# Patient Record
Sex: MALE | Race: WHITE | NOT HISPANIC OR LATINO | Employment: FULL TIME | URBAN - METROPOLITAN AREA
[De-identification: names, ages, dates, MRNs, and addresses within clinical notes are randomized per-mention and may not be internally consistent; named-entity substitution may affect disease eponyms.]

---

## 2020-02-12 ENCOUNTER — TRANSCRIBE ORDERS (OUTPATIENT)
Dept: NEUROSURGERY | Facility: CLINIC | Age: 60
End: 2020-02-12

## 2020-02-12 DIAGNOSIS — M54.50 LOWER BACK PAIN: Primary | ICD-10-CM

## 2020-02-19 ENCOUNTER — OFFICE VISIT (OUTPATIENT)
Dept: NEUROSURGERY | Facility: CLINIC | Age: 60
End: 2020-02-19
Payer: COMMERCIAL

## 2020-02-19 VITALS
HEIGHT: 68 IN | RESPIRATION RATE: 16 BRPM | DIASTOLIC BLOOD PRESSURE: 84 MMHG | BODY MASS INDEX: 39.07 KG/M2 | SYSTOLIC BLOOD PRESSURE: 122 MMHG | WEIGHT: 257.8 LBS | HEART RATE: 64 BPM | TEMPERATURE: 98.6 F

## 2020-02-19 DIAGNOSIS — M54.16 LUMBAR RADICULOPATHY: ICD-10-CM

## 2020-02-19 DIAGNOSIS — M53.2X6 SPINAL INSTABILITY, LUMBAR: ICD-10-CM

## 2020-02-19 DIAGNOSIS — M54.50 LOWER BACK PAIN: ICD-10-CM

## 2020-02-19 DIAGNOSIS — M43.16 SPONDYLOLISTHESIS OF LUMBAR REGION: ICD-10-CM

## 2020-02-19 DIAGNOSIS — G89.4 CHRONIC PAIN SYNDROME: Primary | ICD-10-CM

## 2020-02-19 DIAGNOSIS — D17.79 EPIDURAL LIPOMATOSIS: ICD-10-CM

## 2020-02-19 DIAGNOSIS — M48.062 LUMBAR STENOSIS WITH NEUROGENIC CLAUDICATION: ICD-10-CM

## 2020-02-19 PROCEDURE — 99204 OFFICE O/P NEW MOD 45 MIN: CPT | Performed by: NURSE PRACTITIONER

## 2020-02-19 RX ORDER — NORTRIPTYLINE HYDROCHLORIDE 25 MG/1
25 CAPSULE ORAL
COMMUNITY
Start: 2019-08-26 | End: 2020-10-24 | Stop reason: ALTCHOICE

## 2020-02-19 RX ORDER — LISINOPRIL 20 MG/1
20 TABLET ORAL DAILY
COMMUNITY
Start: 2019-09-27 | End: 2021-10-21 | Stop reason: HOSPADM

## 2020-02-19 RX ORDER — DULOXETIN HYDROCHLORIDE 60 MG/1
60 CAPSULE, DELAYED RELEASE ORAL DAILY
COMMUNITY
Start: 2019-08-26 | End: 2022-08-02

## 2020-02-19 RX ORDER — LINAGLIPTIN 5 MG/1
TABLET, FILM COATED ORAL
COMMUNITY
Start: 2019-12-24 | End: 2020-10-24 | Stop reason: ALTCHOICE

## 2020-02-19 RX ORDER — ATORVASTATIN CALCIUM 20 MG/1
20 TABLET, FILM COATED ORAL
COMMUNITY
Start: 2020-01-10 | End: 2020-10-31 | Stop reason: HOSPADM

## 2020-02-19 RX ORDER — METOPROLOL SUCCINATE 100 MG/1
100 TABLET, EXTENDED RELEASE ORAL
COMMUNITY
Start: 2019-12-24 | End: 2020-10-31 | Stop reason: HOSPADM

## 2020-02-19 RX ORDER — OMEGA-3-ACID ETHYL ESTERS 1 G/1
2 CAPSULE, LIQUID FILLED ORAL 2 TIMES DAILY
COMMUNITY
Start: 2019-09-27

## 2020-02-19 RX ORDER — LORATADINE 10 MG/1
10 TABLET ORAL DAILY
COMMUNITY

## 2020-02-19 RX ORDER — GLIMEPIRIDE 4 MG/1
4 TABLET ORAL 2 TIMES DAILY
COMMUNITY
Start: 2019-09-27 | End: 2021-10-21 | Stop reason: HOSPADM

## 2020-02-19 NOTE — PROGRESS NOTES
Assessment/Plan:  As detailed in HPI section    He presents as a self referral for a second surgical opinion , He is currently under care of [facility] for Chronic low back pain, surgical intervention  being considered, Dr Zachary Leyva recommend fusion        Characterization of pain is consistent with Lumbar stenosis and neurogenic claudication  Pain distribution correlates with L5 S1 dermatomal distribution  Imagining MRI Lumbar spine 12/5/2019: reviewed in collaboration w/ Dr Watson Junior,   L5-S1 severe central canal stenosis secondary to disc protrusion, epidural lipomatosis and overgrowth in the posterior elements  Large disc osteophyte complexes extending into foramen left greater than right results in moderate to severe foraminal stenosii on the left  Degenerative disc space disease  with disc osteophyte complexes, protrusions, hypertrophy  Detailed image review with patient  Lumbar spine xray : grade 2 spondylolistheses with 7 mm displacement L4/5, instability  At L4-L5, degenerative changes throughout with severe DDD L5 S1  Instability in lumbar spine need further assessment  Plan :   Flexion and extension imagining--reports recently completed at [facility] will secure DISC then upload in [facility] radiology  Schedule f/u appointment after review Flexion and extension imagining  Need further discussion if surgical pathology appreciated and proposed intervention  Referral weight management ordered---epidural lipomatosis BMI 39 20, weight loss      Diagnoses and all orders for this visit:    Chronic pain syndrome    Lower back pain  -     Ambulatory referral to Neurosurgery  -     Ambulatory referral to Weight Management; Future    Epidural lipomatosis  -     Ambulatory referral to Weight Management; Future    Lumbar radiculopathy  -     Ambulatory referral to Weight Management;  Future    BMI 39 0-39 9,adult  -     Ambulatory referral to Weight Management; Future    Spondylolisthesis of lumbar region    Lumbar stenosis with neurogenic claudication    Spinal instability, lumbar        Subjective:     Self Referral chronic pain     Patient ID: Snehal Rosado is a 60 y o  male  HPI  He has a longstanding history of chronic pain affecting his low back and lower extremities  He reports the spontaneous onset of progressively worsening low back pain with distribution into lower extremities about 1 5 years ago  He describes midline lumbar sacral  pain across bilateral buttocks with distribution into bilateral buttocks down bilateral  thighs into bilateral calves,into front of lower legs to just above ankles associated with numbness and tingling  Has numbness and tingling in bilateral feet which he associates with a history of diabetic neuropathy, HgA1C 7 3, he thinks  Characterization sharp, stabbing, burning pain in legs exacerbated  during walking  , associated with numbness and tingling in legs  Pain severity 10/10 on numeric scale  - 6-7/10 on a usual day  During visit while sitting 4-5/10 in low back  The 10/10 pain occurs when stepping off curb incorrectly or distance walking in airports      Relieving /temporal factors- leaning forward, sitting stretching lower back while sitting in recliner stretching arms high overhead       Aggravating factors--when lifting legs while lying supine in bed   Lifting luggage, form weight of carrying lap top to heavier  Walking long distances, such as in the airport  Standing stationary, leaning backwards, turning and twisting side to side  Wakes in the morning in severe pain when stepping out of bed  Multimodal Treatments:   Denies physical therapy for low back pain  Chiropractor  Treated with IDD traction , stretching, did not help  Epidural steroid injections -@ [facility] October 2019 x 3 , first injection helped for aboiut 1 week  The 2nd and 3rd injections no change in pain      Medicinal - Duloxetine, prescribed by PCP  Since 2019 --has not noted any change in pain  Gabapentin ---no change in pain intensity  Acetaminophen take 1000 mg in AM and  PM with Cymbalta, does not know if it helps, takes out of habit  He denies lower extremity weakness, bowel or bladder dysfunction  He denies ever having back surgery  He is employed full time, 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  Several weeks ago pain was so bad causing him to fall while on escalator, just had to go down  I have spent 60 minutes with patient today in which greater than 50% of this time was spent in assessment, examination, impressions, reviewing imagining and recommendations for care  All questions were answered to his satisfaction, and contact information provided in the event additional questions arise  Patient acknowledged an understanding and agreement with plan  The following portions of the patient's history were reviewed and updated as appropriate:   He  has a past medical history of Back pain, Diabetes mellitus ([facility] [address] ), PAD (peripheral artery disease) ([facility] [address] ), Persistent atrial fibrillation, and Sleep apnea  He   Patient Active Problem List    Diagnosis Date Noted    Epidural lipomatosis 02/23/2020    Lower back pain 02/23/2020    Lumbar radiculopathy 02/23/2020    BMI 39 0-39 9,adult 02/23/2020    Chronic pain syndrome 02/23/2020    Lumbar stenosis with neurogenic claudication 02/23/2020    Spondylolisthesis of lumbar region 02/23/2020    Spinal instability, lumbar 02/23/2020     He  has no past surgical history on file  His family history includes Alzheimer's disease in his mother; Aneurysm in his father; Cancer in his sister; Diabetes in his mother  He  reports that he has quit smoking  He has never used smokeless tobacco  He reports that he drinks alcohol  He reports that he does not use drugs    Current Outpatient Medications   Medication Sig Dispense Refill    ASPIRIN 81 PO daily      atorvastatin (LIPITOR) 20 mg tablet daily at bedtime      DULoxetine (Cymbalta) 60 mg delayed release capsule Take 60 mg by mouth 2 (two) times a day      Empagliflozin-metFORMIN HCl ER (Synjardy XR) 12 5-1000 MG TB24 Synjardy XR 12 5 mg-1,000 mg tablet, extended release      glimepiride (AMARYL) 4 mg tablet glimepiride 4 mg tablet      linaGLIPtin (Tradjenta) 5 MG TABS Tradjenta 5 mg tablet      lisinopril (ZESTRIL) 20 mg tablet daily      metoprolol succinate (Toprol XL) 100 mg 24 hr tablet daily at bedtime      nortriptyline (PAMELOR) 25 mg capsule Take 25 mg by mouth      omega-3-acid ethyl esters (LOVAZA) 1 g capsule 2 (two) times a day      apixaban (Eliquis) 5 mg 2 (two) times a day      loratadine (CLARITIN) 10 mg tablet Take 10 mg by mouth       No current facility-administered medications for this visit  Current Outpatient Medications on File Prior to Visit   Medication Sig    ASPIRIN 81 PO daily    atorvastatin (LIPITOR) 20 mg tablet daily at bedtime    DULoxetine (Cymbalta) 60 mg delayed release capsule Take 60 mg by mouth 2 (two) times a day    Empagliflozin-metFORMIN HCl ER (Synjardy XR) 12 5-1000 MG TB24 Synjardy XR 12 5 mg-1,000 mg tablet, extended release    glimepiride (AMARYL) 4 mg tablet glimepiride 4 mg tablet    linaGLIPtin (Tradjenta) 5 MG TABS Tradjenta 5 mg tablet    lisinopril (ZESTRIL) 20 mg tablet daily    metoprolol succinate (Toprol XL) 100 mg 24 hr tablet daily at bedtime    nortriptyline (PAMELOR) 25 mg capsule Take 25 mg by mouth    omega-3-acid ethyl esters (LOVAZA) 1 g capsule 2 (two) times a day    apixaban (Eliquis) 5 mg 2 (two) times a day    loratadine (CLARITIN) 10 mg tablet Take 10 mg by mouth     No current facility-administered medications on file prior to visit  He is allergic to iodinated diagnostic agents       Review of Systems   Constitutional: Negative  HENT: Negative  Eyes: Negative  Respiratory: Negative  Cardiovascular: Negative  Gastrointestinal: Negative  Endocrine: Negative  Genitourinary: Negative  Musculoskeletal: Positive for back pain and gait problem  Skin: Negative  Allergic/Immunologic: Negative  Hematological:        ASA 81mg - Eliquis    Psychiatric/Behavioral: Negative  All other systems reviewed and are negative  Objective:      /84 (BP Location: Left arm, Patient Position: Sitting, Cuff Size: Large)   Pulse 64   Temp 98 6 °F (37 °C) (Oral)   Resp 16   Ht 5' 8" (1 727 m)   Wt 117 kg (257 lb 12 8 oz)   BMI 39 20 kg/m²          Physical Exam   Constitutional: He is oriented to person, place, and time  No distress  HENT:   Head: Normocephalic and atraumatic  Eyes: EOM are normal  Left eye exhibits no discharge  No scleral icterus  Neck: Neck supple  Cardiovascular: Normal rate and regular rhythm  Pulmonary/Chest: Effort normal    Musculoskeletal: He exhibits no edema, tenderness or deformity  Neurological: He is alert and oriented to person, place, and time  No cranial nerve deficit or sensory deficit  He exhibits normal muscle tone  Gait normal  Coordination normal    Reflex Scores:       Patellar reflexes are 2+ on the right side and 2+ on the left side  Achilles reflexes are 2+ on the right side and 2+ on the left side  Skin: Skin is warm and dry  He is not diaphoretic  No erythema  Psychiatric: He has a normal mood and affect  His behavior is normal    Nursing note and vitals reviewed  Neurologic Exam     Mental Status   Oriented to person, place, and time       Cranial Nerves     CN III, IV, VI   Extraocular motions are normal      Motor Exam     Strength   Right hamstrin/5  Left hamstrin/5  Right anterior tibial: 5/5  Left anterior tibial: 5/5  Right gastroc: 5/5  Left gastroc: 5/5    Gait, Coordination, and Reflexes     Gait  Gait: normal    Reflexes   Right patellar: 2+  Left patellar: 2+  Right achilles: 2+  Left achilles: 2+  Left ankle clonus: absent

## 2020-02-23 PROBLEM — M54.50 LOWER BACK PAIN: Status: ACTIVE | Noted: 2020-02-23

## 2020-02-23 PROBLEM — M53.2X6 SPINAL INSTABILITY, LUMBAR: Status: ACTIVE | Noted: 2020-02-23

## 2020-02-23 PROBLEM — D17.79 EPIDURAL LIPOMATOSIS: Status: ACTIVE | Noted: 2020-02-23

## 2020-02-23 PROBLEM — M43.16 SPONDYLOLISTHESIS OF LUMBAR REGION: Status: ACTIVE | Noted: 2020-02-23

## 2020-02-23 PROBLEM — G89.4 CHRONIC PAIN SYNDROME: Status: ACTIVE | Noted: 2020-02-23

## 2020-02-23 PROBLEM — M48.062 LUMBAR STENOSIS WITH NEUROGENIC CLAUDICATION: Status: ACTIVE | Noted: 2020-02-23

## 2020-02-23 PROBLEM — M54.16 LUMBAR RADICULOPATHY: Status: ACTIVE | Noted: 2020-02-23

## 2020-02-25 NOTE — PROGRESS NOTES
Case discussed with Dr Cb Haq: weight loss recommended at this time , goal BMI 35  Schedule f/u appointment in 6 months or when BMI achieved to reassess  If neurogenic claudication symptoms pesist will order updated imagining to reassess resolution of epidural lipomatosis, Lumbar  Stenosis, degenerative lumbar stenosis, and spondylolisthesis  Referral to bariatrics during visit, patient will f/u for non-surgical intervention  Reports he took disc of flexion and extension images to [facility] radiology on Saturday for uploading---cannot view at this time will f/u mA to call [facility]  He verbalized and understanding and agreement with plan

## 2020-02-28 ENCOUNTER — TELEPHONE (OUTPATIENT)
Dept: NEUROSURGERY | Facility: CLINIC | Age: 60
End: 2020-02-28

## 2020-02-28 NOTE — TELEPHONE ENCOUNTER
Disc from [employer] was uploaded today and handed back to the patient    12/5/19-mri lumbar spine  12/17/19-xray lumbar spine

## 2020-09-09 ENCOUNTER — TRANSCRIBE ORDERS (OUTPATIENT)
Dept: NON INVASIVE DIAGNOSTICS | Facility: CLINIC | Age: 60
End: 2020-09-09

## 2020-09-09 DIAGNOSIS — I48.91 A-FIB (HCC): Primary | ICD-10-CM

## 2020-09-14 ENCOUNTER — HOSPITAL ENCOUNTER (OUTPATIENT)
Dept: NON INVASIVE DIAGNOSTICS | Facility: CLINIC | Age: 60
Discharge: HOME | End: 2020-09-14
Payer: COMMERCIAL

## 2020-09-14 DIAGNOSIS — I48.91 A-FIB (HCC): ICD-10-CM

## 2020-09-14 PROCEDURE — 93226 XTRNL ECG REC<48 HR SCAN A/R: CPT

## 2020-09-14 PROCEDURE — 93225 XTRNL ECG REC<48 HRS REC: CPT

## 2020-09-18 PROCEDURE — 93227 XTRNL ECG REC<48 HR R&I: CPT | Performed by: INTERNAL MEDICINE

## 2020-09-23 ENCOUNTER — OFFICE VISIT (OUTPATIENT)
Dept: CARDIOLOGY CLINIC | Facility: CLINIC | Age: 60
End: 2020-09-23
Payer: COMMERCIAL

## 2020-09-23 VITALS
DIASTOLIC BLOOD PRESSURE: 80 MMHG | WEIGHT: 254 LBS | HEIGHT: 68 IN | BODY MASS INDEX: 38.49 KG/M2 | HEART RATE: 98 BPM | SYSTOLIC BLOOD PRESSURE: 120 MMHG

## 2020-09-23 DIAGNOSIS — I48.91 ATRIAL FIBRILLATION, UNSPECIFIED TYPE (HCC): Primary | ICD-10-CM

## 2020-09-23 DIAGNOSIS — G47.33 OBSTRUCTIVE SLEEP APNEA SYNDROME: ICD-10-CM

## 2020-09-23 DIAGNOSIS — I48.19 PERSISTENT ATRIAL FIBRILLATION (HCC): ICD-10-CM

## 2020-09-23 DIAGNOSIS — I73.9 PAD (PERIPHERAL ARTERY DISEASE) (HCC): ICD-10-CM

## 2020-09-23 PROCEDURE — 99204 OFFICE O/P NEW MOD 45 MIN: CPT | Performed by: INTERNAL MEDICINE

## 2020-09-23 PROCEDURE — 93000 ELECTROCARDIOGRAM COMPLETE: CPT | Performed by: INTERNAL MEDICINE

## 2020-09-23 NOTE — PROGRESS NOTES
Patient ID: Peace Nava is a 63 y o  male  Plan:      PAD (peripheral artery disease) (HCC)  Bilateral calf claudication  Will another duplex and have him see vascular  Sleep apnea  Doesn't seem to be effectively treated  Has CPAP  Will have him see sleep physician  Persistent atrial fibrillation (HCC)  Rate ok  On eliquis but aspirin will be stopped  BMI 39 0-39 9,adult  Low carb diet recommended  Follow up Plan:  Follow-up with vascular physician as well as sleep physician  Lower extremity duplex will be ordered  Follow-up visit and EKG with me in 1 year  HPI:  Patient is seen today to reestablish care  I have not seen him in several years and that was only for testing  He has had atrial fibrillation for what sounds like 2 years or so  No feeling of palpitations  Other problems are as outlined above  No recent change in exertional capacity  I am struck by the fact that his sleep treatment is ineffective  He has bilateral calf discomfort with ambulation and sounds like a combination of vascular and spinal issues  Results for orders placed or performed in visit on 09/23/20   POCT ECG    Impression    Afib with controlled response  NSSTs  Most recent or relevant cardiac/vascular testing:    Echo 5/1/2018: Mild LVH  Normal EF  Holter 9/14/2020: Afib   Average   S-Echo 12/18/2017: No ischemia      History reviewed  No pertinent surgical history  CMP: No results found for: NA, K, CL, CO2, BUN, CREATININE, GLUCOSE, EGFR    Lipid Profile: No results found for: CHOL, TRIG, HDL, LDL      Review of Systems   10  point ROS  was otherwise non pertinent or negative except as per HPI or as below  Gait: Normal         Objective:     /80   Pulse 98   Ht 5' 8" (1 727 m)   Wt 115 kg (254 lb)   BMI 38 62 kg/m²     PHYSICAL EXAM:    General:  Normal appearance in no distress  Eyes:  Anicteric  Oral mucosa:  Moist   Neck:  No JVD   Carotid upstrokes are brisk without bruits  No masses  Chest:  Clear to auscultation and percussion  Cardiac:  Irregularly irregular  No palpable PMI  Normal S1 and S2  No murmur gallop or rub  Abdomen:  Soft and nontender  No palpable organomegaly or aortic enlargement  Extremities:  No peripheral edema  Musculoskeletal:  Symmetric  Vascular:  Femoral pulses are brisk without bruits  Popliteal and pedal pulses are absent  The feet are warm  Neuro:  Grossly symmetric  Psych:  Alert and oriented x3          Current Outpatient Medications:     apixaban (Eliquis) 5 mg, Take 5 mg by mouth 2 (two) times a day , Disp: , Rfl:     atorvastatin (LIPITOR) 20 mg tablet, Take 20 mg by mouth daily , Disp: , Rfl:     DULoxetine (Cymbalta) 60 mg delayed release capsule, Take 60 mg by mouth 2 (two) times a day, Disp: , Rfl:     Empagliflozin-metFORMIN HCl ER (Synjardy XR) 12 5-1000 MG TB24, 2 (two) times a day , Disp: , Rfl:     glimepiride (AMARYL) 4 mg tablet, 2 (two) times a day , Disp: , Rfl:     linaGLIPtin (Tradjenta) 5 MG TABS, Tradjenta 5 mg tablet, Disp: , Rfl:     lisinopril (ZESTRIL) 20 mg tablet, Take 20 mg by mouth daily , Disp: , Rfl:     loratadine (CLARITIN) 10 mg tablet, Take 10 mg by mouth, Disp: , Rfl:     metoprolol succinate (Toprol XL) 100 mg 24 hr tablet, Take 100 mg by mouth daily, Disp: , Rfl:     omega-3-acid ethyl esters (LOVAZA) 1 g capsule, Take 2 g by mouth 2 (two) times a day , Disp: , Rfl:     nortriptyline (PAMELOR) 25 mg capsule, Take 25 mg by mouth, Disp: , Rfl:   Allergies   Allergen Reactions    Iodinated Diagnostic Agents      Past Medical History:   Diagnosis Date    Atrial fibrillation ([facility] [address] )     Back pain     Diabetes mellitus ([facility] [address] )     Hyperlipidemia     Hypertension     PAD (peripheral artery disease) (HCC)     Persistent atrial fibrillation (HCC)     Sleep apnea            Social History     Tobacco Use   Smoking Status Former Smoker   Smokeless Tobacco Never Used

## 2020-09-29 ENCOUNTER — TRANSCRIBE ORDERS (OUTPATIENT)
Dept: ADMINISTRATIVE | Facility: HOSPITAL | Age: 60
End: 2020-09-29

## 2020-09-29 DIAGNOSIS — Z80.1 FAMILY HX OF LUNG CANCER: Primary | ICD-10-CM

## 2020-10-08 ENCOUNTER — HOSPITAL ENCOUNTER (OUTPATIENT)
Dept: CT IMAGING | Facility: HOSPITAL | Age: 60
Discharge: HOME | End: 2020-10-08
Payer: COMMERCIAL

## 2020-10-08 DIAGNOSIS — Z87.891 PERSONAL HISTORY OF TOBACCO USE, PRESENTING HAZARDS TO HEALTH: ICD-10-CM

## 2020-10-08 DIAGNOSIS — Z80.1 FAMILY HX OF LUNG CANCER: ICD-10-CM

## 2020-10-08 PROCEDURE — G0297 LDCT FOR LUNG CA SCREEN: HCPCS

## 2020-10-08 PROCEDURE — G1004 CDSM NDSC: HCPCS

## 2020-10-15 ENCOUNTER — PATIENT MESSAGE (OUTPATIENT)
Dept: CARDIOLOGY CLINIC | Facility: CLINIC | Age: 60
End: 2020-10-15

## 2020-10-15 DIAGNOSIS — E78.2 MIXED HYPERLIPIDEMIA: Primary | ICD-10-CM

## 2020-10-24 ENCOUNTER — HOSPITAL ENCOUNTER (INPATIENT)
Facility: HOSPITAL | Age: 60
LOS: 4 days | DRG: 287 | End: 2020-10-28
Attending: EMERGENCY MEDICINE | Admitting: STUDENT IN AN ORGANIZED HEALTH CARE EDUCATION/TRAINING PROGRAM
Payer: COMMERCIAL

## 2020-10-24 ENCOUNTER — APPOINTMENT (EMERGENCY)
Dept: RADIOLOGY | Facility: HOSPITAL | Age: 60
DRG: 287 | End: 2020-10-24
Payer: COMMERCIAL

## 2020-10-24 DIAGNOSIS — I50.9 CHF (CONGESTIVE HEART FAILURE) (HCC): Primary | ICD-10-CM

## 2020-10-24 DIAGNOSIS — I48.91 ATRIAL FIBRILLATION WITH RVR (HCC): ICD-10-CM

## 2020-10-24 PROBLEM — F10.10 ALCOHOL ABUSE: Status: ACTIVE | Noted: 2020-10-24

## 2020-10-24 PROBLEM — E11.9 DIABETES (HCC): Status: ACTIVE | Noted: 2020-10-24

## 2020-10-24 PROBLEM — R05.8 DRY COUGH: Status: ACTIVE | Noted: 2020-10-24

## 2020-10-24 LAB
ALBUMIN SERPL BCP-MCNC: 4 G/DL (ref 3.5–5)
ALP SERPL-CCNC: 56 U/L (ref 46–116)
ALT SERPL W P-5'-P-CCNC: 59 U/L (ref 12–78)
ANION GAP SERPL CALCULATED.3IONS-SCNC: 10 MMOL/L (ref 4–13)
APTT PPP: 29 SECONDS (ref 23–37)
AST SERPL W P-5'-P-CCNC: 27 U/L (ref 5–45)
ATRIAL RATE: 122 BPM
BASOPHILS # BLD AUTO: 0.07 THOUSANDS/ΜL (ref 0–0.1)
BASOPHILS NFR BLD AUTO: 1 % (ref 0–1)
BILIRUB SERPL-MCNC: 0.8 MG/DL (ref 0.2–1)
BUN SERPL-MCNC: 13 MG/DL (ref 5–25)
CALCIUM SERPL-MCNC: 9.7 MG/DL (ref 8.3–10.1)
CHLORIDE SERPL-SCNC: 98 MMOL/L (ref 100–108)
CO2 SERPL-SCNC: 29 MMOL/L (ref 21–32)
CREAT SERPL-MCNC: 1.26 MG/DL (ref 0.6–1.3)
EOSINOPHIL # BLD AUTO: 0.1 THOUSAND/ΜL (ref 0–0.61)
EOSINOPHIL NFR BLD AUTO: 1 % (ref 0–6)
ERYTHROCYTE [DISTWIDTH] IN BLOOD BY AUTOMATED COUNT: 13.1 % (ref 11.6–15.1)
GFR SERPL CREATININE-BSD FRML MDRD: 61 ML/MIN/1.73SQ M
GLUCOSE SERPL-MCNC: 152 MG/DL (ref 65–140)
GLUCOSE SERPL-MCNC: 168 MG/DL (ref 65–140)
GLUCOSE SERPL-MCNC: 282 MG/DL (ref 65–140)
HCT VFR BLD AUTO: 46.3 % (ref 36.5–49.3)
HGB BLD-MCNC: 15.9 G/DL (ref 12–17)
IMM GRANULOCYTES # BLD AUTO: 0.04 THOUSAND/UL (ref 0–0.2)
IMM GRANULOCYTES NFR BLD AUTO: 0 % (ref 0–2)
INR PPP: 1.14 (ref 0.84–1.19)
LYMPHOCYTES # BLD AUTO: 2.96 THOUSANDS/ΜL (ref 0.6–4.47)
LYMPHOCYTES NFR BLD AUTO: 27 % (ref 14–44)
MCH RBC QN AUTO: 34.2 PG (ref 26.8–34.3)
MCHC RBC AUTO-ENTMCNC: 34.3 G/DL (ref 31.4–37.4)
MCV RBC AUTO: 100 FL (ref 82–98)
MONOCYTES # BLD AUTO: 0.93 THOUSAND/ΜL (ref 0.17–1.22)
MONOCYTES NFR BLD AUTO: 8 % (ref 4–12)
NEUTROPHILS # BLD AUTO: 7 THOUSANDS/ΜL (ref 1.85–7.62)
NEUTS SEG NFR BLD AUTO: 63 % (ref 43–75)
NRBC BLD AUTO-RTO: 0 /100 WBCS
NT-PROBNP SERPL-MCNC: 1091 PG/ML
PLATELET # BLD AUTO: 229 THOUSANDS/UL (ref 149–390)
PMV BLD AUTO: 10.7 FL (ref 8.9–12.7)
POTASSIUM SERPL-SCNC: 4.2 MMOL/L (ref 3.5–5.3)
PROT SERPL-MCNC: 7.6 G/DL (ref 6.4–8.2)
PROTHROMBIN TIME: 14.1 SECONDS (ref 11.6–14.5)
QRS AXIS: 37 DEGREES
QRSD INTERVAL: 68 MS
QT INTERVAL: 322 MS
QTC INTERVAL: 464 MS
RBC # BLD AUTO: 4.65 MILLION/UL (ref 3.88–5.62)
SARS-COV-2 RNA RESP QL NAA+PROBE: NEGATIVE
SODIUM SERPL-SCNC: 137 MMOL/L (ref 136–145)
T WAVE AXIS: 57 DEGREES
TROPONIN I SERPL-MCNC: <0.02 NG/ML
VENTRICULAR RATE: 125 BPM
WBC # BLD AUTO: 11.1 THOUSAND/UL (ref 4.31–10.16)

## 2020-10-24 PROCEDURE — 85730 THROMBOPLASTIN TIME PARTIAL: CPT | Performed by: EMERGENCY MEDICINE

## 2020-10-24 PROCEDURE — 83880 ASSAY OF NATRIURETIC PEPTIDE: CPT | Performed by: EMERGENCY MEDICINE

## 2020-10-24 PROCEDURE — 80053 COMPREHEN METABOLIC PANEL: CPT | Performed by: EMERGENCY MEDICINE

## 2020-10-24 PROCEDURE — 71046 X-RAY EXAM CHEST 2 VIEWS: CPT

## 2020-10-24 PROCEDURE — 93005 ELECTROCARDIOGRAM TRACING: CPT

## 2020-10-24 PROCEDURE — 84484 ASSAY OF TROPONIN QUANT: CPT | Performed by: EMERGENCY MEDICINE

## 2020-10-24 PROCEDURE — 93010 ELECTROCARDIOGRAM REPORT: CPT | Performed by: INTERNAL MEDICINE

## 2020-10-24 PROCEDURE — 99285 EMERGENCY DEPT VISIT HI MDM: CPT

## 2020-10-24 PROCEDURE — 85025 COMPLETE CBC W/AUTO DIFF WBC: CPT | Performed by: EMERGENCY MEDICINE

## 2020-10-24 PROCEDURE — 96374 THER/PROPH/DIAG INJ IV PUSH: CPT

## 2020-10-24 PROCEDURE — 83036 HEMOGLOBIN GLYCOSYLATED A1C: CPT | Performed by: STUDENT IN AN ORGANIZED HEALTH CARE EDUCATION/TRAINING PROGRAM

## 2020-10-24 PROCEDURE — 85610 PROTHROMBIN TIME: CPT | Performed by: EMERGENCY MEDICINE

## 2020-10-24 PROCEDURE — 96375 TX/PRO/DX INJ NEW DRUG ADDON: CPT

## 2020-10-24 PROCEDURE — 36415 COLL VENOUS BLD VENIPUNCTURE: CPT | Performed by: EMERGENCY MEDICINE

## 2020-10-24 PROCEDURE — 99291 CRITICAL CARE FIRST HOUR: CPT | Performed by: EMERGENCY MEDICINE

## 2020-10-24 PROCEDURE — 82948 REAGENT STRIP/BLOOD GLUCOSE: CPT

## 2020-10-24 PROCEDURE — 96361 HYDRATE IV INFUSION ADD-ON: CPT

## 2020-10-24 PROCEDURE — 87635 SARS-COV-2 COVID-19 AMP PRB: CPT | Performed by: EMERGENCY MEDICINE

## 2020-10-24 PROCEDURE — 90682 RIV4 VACC RECOMBINANT DNA IM: CPT | Performed by: STUDENT IN AN ORGANIZED HEALTH CARE EDUCATION/TRAINING PROGRAM

## 2020-10-24 PROCEDURE — 99223 1ST HOSP IP/OBS HIGH 75: CPT | Performed by: STUDENT IN AN ORGANIZED HEALTH CARE EDUCATION/TRAINING PROGRAM

## 2020-10-24 PROCEDURE — 90471 IMMUNIZATION ADMIN: CPT | Performed by: STUDENT IN AN ORGANIZED HEALTH CARE EDUCATION/TRAINING PROGRAM

## 2020-10-24 RX ORDER — ACETAMINOPHEN 325 MG/1
650 TABLET ORAL EVERY 6 HOURS PRN
COMMUNITY

## 2020-10-24 RX ORDER — DULOXETIN HYDROCHLORIDE 60 MG/1
60 CAPSULE, DELAYED RELEASE ORAL DAILY
Status: DISCONTINUED | OUTPATIENT
Start: 2020-10-25 | End: 2020-10-28 | Stop reason: HOSPADM

## 2020-10-24 RX ORDER — ATORVASTATIN CALCIUM 20 MG/1
20 TABLET, FILM COATED ORAL
Status: DISCONTINUED | OUTPATIENT
Start: 2020-10-24 | End: 2020-10-28 | Stop reason: HOSPADM

## 2020-10-24 RX ORDER — METOPROLOL SUCCINATE 100 MG/1
100 TABLET, EXTENDED RELEASE ORAL
Status: DISCONTINUED | OUTPATIENT
Start: 2020-10-24 | End: 2020-10-25

## 2020-10-24 RX ORDER — ASPIRIN 81 MG/1
81 TABLET ORAL DAILY
Status: DISCONTINUED | OUTPATIENT
Start: 2020-10-25 | End: 2020-10-25

## 2020-10-24 RX ORDER — NITROGLYCERIN 0.4 MG/1
0.4 TABLET SUBLINGUAL ONCE
Status: COMPLETED | OUTPATIENT
Start: 2020-10-24 | End: 2020-10-24

## 2020-10-24 RX ORDER — SENNOSIDES 8.6 MG
1 TABLET ORAL DAILY
Status: DISCONTINUED | OUTPATIENT
Start: 2020-10-24 | End: 2020-10-28 | Stop reason: HOSPADM

## 2020-10-24 RX ORDER — DOCUSATE SODIUM 100 MG/1
100 CAPSULE, LIQUID FILLED ORAL 2 TIMES DAILY
Status: DISCONTINUED | OUTPATIENT
Start: 2020-10-24 | End: 2020-10-28 | Stop reason: HOSPADM

## 2020-10-24 RX ORDER — CALCIUM CARBONATE 200(500)MG
1000 TABLET,CHEWABLE ORAL DAILY PRN
Status: DISCONTINUED | OUTPATIENT
Start: 2020-10-24 | End: 2020-10-28 | Stop reason: HOSPADM

## 2020-10-24 RX ORDER — ONDANSETRON 2 MG/ML
4 INJECTION INTRAMUSCULAR; INTRAVENOUS EVERY 6 HOURS PRN
Status: DISCONTINUED | OUTPATIENT
Start: 2020-10-24 | End: 2020-10-28 | Stop reason: HOSPADM

## 2020-10-24 RX ORDER — ASPIRIN 81 MG/1
81 TABLET ORAL DAILY
COMMUNITY
End: 2021-10-21 | Stop reason: HOSPADM

## 2020-10-24 RX ORDER — ASPIRIN 81 MG/1
324 TABLET, CHEWABLE ORAL ONCE
Status: DISCONTINUED | OUTPATIENT
Start: 2020-10-24 | End: 2020-10-24

## 2020-10-24 RX ORDER — METOPROLOL TARTRATE 5 MG/5ML
5 INJECTION INTRAVENOUS EVERY 6 HOURS PRN
Status: DISCONTINUED | OUTPATIENT
Start: 2020-10-24 | End: 2020-10-28 | Stop reason: HOSPADM

## 2020-10-24 RX ORDER — FUROSEMIDE 10 MG/ML
40 INJECTION INTRAMUSCULAR; INTRAVENOUS ONCE
Status: COMPLETED | OUTPATIENT
Start: 2020-10-24 | End: 2020-10-24

## 2020-10-24 RX ORDER — ASPIRIN 81 MG/1
243 TABLET, CHEWABLE ORAL ONCE
Status: COMPLETED | OUTPATIENT
Start: 2020-10-24 | End: 2020-10-24

## 2020-10-24 RX ORDER — ACETAMINOPHEN 325 MG/1
650 TABLET ORAL EVERY 6 HOURS PRN
Status: DISCONTINUED | OUTPATIENT
Start: 2020-10-24 | End: 2020-10-28 | Stop reason: HOSPADM

## 2020-10-24 RX ORDER — DILTIAZEM HYDROCHLORIDE 5 MG/ML
25 INJECTION INTRAVENOUS ONCE
Status: DISCONTINUED | OUTPATIENT
Start: 2020-10-24 | End: 2020-10-24

## 2020-10-24 RX ORDER — DILTIAZEM HYDROCHLORIDE 5 MG/ML
20 INJECTION INTRAVENOUS ONCE
Status: COMPLETED | OUTPATIENT
Start: 2020-10-24 | End: 2020-10-24

## 2020-10-24 RX ADMIN — INFLUENZA A VIRUS A/HAWAII/70/2019 (H1N1) RECOMBINANT HEMAGGLUTININ ANTIGEN, INFLUENZA A VIRUS A/MINNESOTA/41/2019 (H3N2) RECOMBINANT HEMAGGLUTININ ANTIGEN, INFLUENZA B VIRUS B/WASHINGTON/02/2019 RECOMBINANT HEMAGGLUTININ ANTIGEN, AND INFLUENZA B VIRUS B/PHUKET/3073/2013 RECOMBINANT HEMAGGLUTININ ANTIGEN 0.5 ML: 45; 45; 45; 45 INJECTION INTRAMUSCULAR at 18:19

## 2020-10-24 RX ADMIN — SODIUM CHLORIDE 500 ML: 0.9 INJECTION, SOLUTION INTRAVENOUS at 11:42

## 2020-10-24 RX ADMIN — DOCUSATE SODIUM 100 MG: 100 CAPSULE, LIQUID FILLED ORAL at 14:09

## 2020-10-24 RX ADMIN — FUROSEMIDE 40 MG: 10 INJECTION, SOLUTION INTRAMUSCULAR; INTRAVENOUS at 12:26

## 2020-10-24 RX ADMIN — DILTIAZEM HYDROCHLORIDE 20 MG: 5 INJECTION INTRAVENOUS at 12:32

## 2020-10-24 RX ADMIN — SENNOSIDES 8.6 MG: 8.6 TABLET, FILM COATED ORAL at 14:09

## 2020-10-24 RX ADMIN — NITROGLYCERIN 0.4 MG: 0.4 TABLET SUBLINGUAL at 11:50

## 2020-10-24 RX ADMIN — ASPIRIN 243 MG: 81 TABLET, CHEWABLE ORAL at 12:11

## 2020-10-24 RX ADMIN — METOPROLOL SUCCINATE 100 MG: 100 TABLET, EXTENDED RELEASE ORAL at 21:08

## 2020-10-24 RX ADMIN — ATORVASTATIN CALCIUM 20 MG: 20 TABLET, FILM COATED ORAL at 21:08

## 2020-10-24 RX ADMIN — INSULIN LISPRO 1 UNITS: 100 INJECTION, SOLUTION INTRAVENOUS; SUBCUTANEOUS at 16:33

## 2020-10-24 RX ADMIN — INSULIN LISPRO 1 UNITS: 100 INJECTION, SOLUTION INTRAVENOUS; SUBCUTANEOUS at 21:12

## 2020-10-24 RX ADMIN — APIXABAN 5 MG: 5 TABLET, FILM COATED ORAL at 18:15

## 2020-10-25 ENCOUNTER — APPOINTMENT (INPATIENT)
Dept: NON INVASIVE DIAGNOSTICS | Facility: HOSPITAL | Age: 60
DRG: 287 | End: 2020-10-25
Payer: COMMERCIAL

## 2020-10-25 LAB
ANION GAP SERPL CALCULATED.3IONS-SCNC: 8 MMOL/L (ref 4–13)
ATRIAL RATE: 56 BPM
BUN SERPL-MCNC: 14 MG/DL (ref 5–25)
CALCIUM SERPL-MCNC: 9.7 MG/DL (ref 8.3–10.1)
CHLORIDE SERPL-SCNC: 101 MMOL/L (ref 100–108)
CO2 SERPL-SCNC: 30 MMOL/L (ref 21–32)
CREAT SERPL-MCNC: 1.12 MG/DL (ref 0.6–1.3)
ERYTHROCYTE [DISTWIDTH] IN BLOOD BY AUTOMATED COUNT: 13 % (ref 11.6–15.1)
EST. AVERAGE GLUCOSE BLD GHB EST-MCNC: 171 MG/DL
GFR SERPL CREATININE-BSD FRML MDRD: 71 ML/MIN/1.73SQ M
GLUCOSE SERPL-MCNC: 164 MG/DL (ref 65–140)
GLUCOSE SERPL-MCNC: 176 MG/DL (ref 65–140)
GLUCOSE SERPL-MCNC: 209 MG/DL (ref 65–140)
GLUCOSE SERPL-MCNC: 229 MG/DL (ref 65–140)
GLUCOSE SERPL-MCNC: 241 MG/DL (ref 65–140)
HBA1C MFR BLD: 7.6 %
HCT VFR BLD AUTO: 46.4 % (ref 36.5–49.3)
HGB BLD-MCNC: 15.9 G/DL (ref 12–17)
MAGNESIUM SERPL-MCNC: 1.9 MG/DL (ref 1.6–2.6)
MCH RBC QN AUTO: 34 PG (ref 26.8–34.3)
MCHC RBC AUTO-ENTMCNC: 34.3 G/DL (ref 31.4–37.4)
MCV RBC AUTO: 99 FL (ref 82–98)
PLATELET # BLD AUTO: 208 THOUSANDS/UL (ref 149–390)
PMV BLD AUTO: 10.3 FL (ref 8.9–12.7)
POTASSIUM SERPL-SCNC: 4.4 MMOL/L (ref 3.5–5.3)
QRS AXIS: 39 DEGREES
QRSD INTERVAL: 76 MS
QT INTERVAL: 344 MS
QTC INTERVAL: 460 MS
RBC # BLD AUTO: 4.67 MILLION/UL (ref 3.88–5.62)
SODIUM SERPL-SCNC: 139 MMOL/L (ref 136–145)
T WAVE AXIS: 36 DEGREES
VENTRICULAR RATE: 108 BPM
WBC # BLD AUTO: 9.57 THOUSAND/UL (ref 4.31–10.16)

## 2020-10-25 PROCEDURE — 99232 SBSQ HOSP IP/OBS MODERATE 35: CPT | Performed by: STUDENT IN AN ORGANIZED HEALTH CARE EDUCATION/TRAINING PROGRAM

## 2020-10-25 PROCEDURE — 99222 1ST HOSP IP/OBS MODERATE 55: CPT | Performed by: INTERNAL MEDICINE

## 2020-10-25 PROCEDURE — 93306 TTE W/DOPPLER COMPLETE: CPT | Performed by: INTERNAL MEDICINE

## 2020-10-25 PROCEDURE — 93306 TTE W/DOPPLER COMPLETE: CPT

## 2020-10-25 PROCEDURE — 83735 ASSAY OF MAGNESIUM: CPT | Performed by: STUDENT IN AN ORGANIZED HEALTH CARE EDUCATION/TRAINING PROGRAM

## 2020-10-25 PROCEDURE — 85027 COMPLETE CBC AUTOMATED: CPT | Performed by: STUDENT IN AN ORGANIZED HEALTH CARE EDUCATION/TRAINING PROGRAM

## 2020-10-25 PROCEDURE — 82948 REAGENT STRIP/BLOOD GLUCOSE: CPT

## 2020-10-25 PROCEDURE — 80048 BASIC METABOLIC PNL TOTAL CA: CPT | Performed by: STUDENT IN AN ORGANIZED HEALTH CARE EDUCATION/TRAINING PROGRAM

## 2020-10-25 PROCEDURE — 93010 ELECTROCARDIOGRAM REPORT: CPT | Performed by: INTERNAL MEDICINE

## 2020-10-25 RX ORDER — CHLORDIAZEPOXIDE HYDROCHLORIDE 25 MG/1
50 CAPSULE, GELATIN COATED ORAL EVERY 8 HOURS SCHEDULED
Status: DISCONTINUED | OUTPATIENT
Start: 2020-10-25 | End: 2020-10-26

## 2020-10-25 RX ORDER — CHLORDIAZEPOXIDE HYDROCHLORIDE 25 MG/1
50 CAPSULE, GELATIN COATED ORAL EVERY 8 HOURS SCHEDULED
Status: DISCONTINUED | OUTPATIENT
Start: 2020-10-25 | End: 2020-10-25

## 2020-10-25 RX ORDER — INSULIN GLARGINE 100 [IU]/ML
3 INJECTION, SOLUTION SUBCUTANEOUS
Status: DISCONTINUED | OUTPATIENT
Start: 2020-10-25 | End: 2020-10-26

## 2020-10-25 RX ORDER — CHLORDIAZEPOXIDE HYDROCHLORIDE 25 MG/1
75 CAPSULE, GELATIN COATED ORAL EVERY 6 HOURS PRN
Status: DISCONTINUED | OUTPATIENT
Start: 2020-10-25 | End: 2020-10-25

## 2020-10-25 RX ORDER — METOPROLOL TARTRATE 50 MG/1
100 TABLET, FILM COATED ORAL EVERY 12 HOURS SCHEDULED
Status: DISCONTINUED | OUTPATIENT
Start: 2020-10-25 | End: 2020-10-26

## 2020-10-25 RX ORDER — METOPROLOL TARTRATE 50 MG/1
50 TABLET, FILM COATED ORAL 3 TIMES DAILY
Status: DISCONTINUED | OUTPATIENT
Start: 2020-10-25 | End: 2020-10-25

## 2020-10-25 RX ORDER — LORAZEPAM 2 MG/ML
1 INJECTION INTRAMUSCULAR EVERY 6 HOURS PRN
Status: DISCONTINUED | OUTPATIENT
Start: 2020-10-25 | End: 2020-10-28 | Stop reason: HOSPADM

## 2020-10-25 RX ORDER — METOPROLOL TARTRATE 50 MG/1
50 TABLET, FILM COATED ORAL ONCE
Status: COMPLETED | OUTPATIENT
Start: 2020-10-25 | End: 2020-10-25

## 2020-10-25 RX ADMIN — ACETAMINOPHEN 650 MG: 325 TABLET, FILM COATED ORAL at 17:24

## 2020-10-25 RX ADMIN — INSULIN LISPRO 1 UNITS: 100 INJECTION, SOLUTION INTRAVENOUS; SUBCUTANEOUS at 08:31

## 2020-10-25 RX ADMIN — APIXABAN 5 MG: 5 TABLET, FILM COATED ORAL at 21:52

## 2020-10-25 RX ADMIN — INSULIN GLARGINE 3 UNITS: 100 INJECTION, SOLUTION SUBCUTANEOUS at 21:47

## 2020-10-25 RX ADMIN — SENNOSIDES 8.6 MG: 8.6 TABLET, FILM COATED ORAL at 08:27

## 2020-10-25 RX ADMIN — CHLORDIAZEPOXIDE HYDROCHLORIDE 50 MG: 25 CAPSULE ORAL at 21:47

## 2020-10-25 RX ADMIN — METOPROLOL TARTRATE 100 MG: 50 TABLET, FILM COATED ORAL at 21:47

## 2020-10-25 RX ADMIN — INSULIN LISPRO 2 UNITS: 100 INJECTION, SOLUTION INTRAVENOUS; SUBCUTANEOUS at 17:01

## 2020-10-25 RX ADMIN — ATORVASTATIN CALCIUM 20 MG: 20 TABLET, FILM COATED ORAL at 21:47

## 2020-10-25 RX ADMIN — DULOXETINE HYDROCHLORIDE 60 MG: 60 CAPSULE, DELAYED RELEASE ORAL at 08:26

## 2020-10-25 RX ADMIN — INSULIN LISPRO 2 UNITS: 100 INJECTION, SOLUTION INTRAVENOUS; SUBCUTANEOUS at 22:20

## 2020-10-25 RX ADMIN — CHLORDIAZEPOXIDE HYDROCHLORIDE 50 MG: 25 CAPSULE ORAL at 10:57

## 2020-10-25 RX ADMIN — METOPROLOL TARTRATE 50 MG: 50 TABLET, FILM COATED ORAL at 10:58

## 2020-10-25 RX ADMIN — APIXABAN 5 MG: 5 TABLET, FILM COATED ORAL at 08:27

## 2020-10-25 RX ADMIN — INSULIN LISPRO 2 UNITS: 100 INJECTION, SOLUTION INTRAVENOUS; SUBCUTANEOUS at 11:53

## 2020-10-25 RX ADMIN — ASPIRIN 81 MG: 81 TABLET ORAL at 08:26

## 2020-10-25 RX ADMIN — METOPROLOL TARTRATE 50 MG: 50 TABLET, FILM COATED ORAL at 10:57

## 2020-10-25 RX ADMIN — DOCUSATE SODIUM 100 MG: 100 CAPSULE, LIQUID FILLED ORAL at 21:53

## 2020-10-26 LAB
ANION GAP SERPL CALCULATED.3IONS-SCNC: 7 MMOL/L (ref 4–13)
BASOPHILS # BLD AUTO: 0.07 THOUSANDS/ΜL (ref 0–0.1)
BASOPHILS NFR BLD AUTO: 1 % (ref 0–1)
BUN SERPL-MCNC: 23 MG/DL (ref 5–25)
CALCIUM SERPL-MCNC: 9.5 MG/DL (ref 8.3–10.1)
CHLORIDE SERPL-SCNC: 102 MMOL/L (ref 100–108)
CO2 SERPL-SCNC: 31 MMOL/L (ref 21–32)
CREAT SERPL-MCNC: 1.26 MG/DL (ref 0.6–1.3)
EOSINOPHIL # BLD AUTO: 0.17 THOUSAND/ΜL (ref 0–0.61)
EOSINOPHIL NFR BLD AUTO: 2 % (ref 0–6)
ERYTHROCYTE [DISTWIDTH] IN BLOOD BY AUTOMATED COUNT: 12.9 % (ref 11.6–15.1)
GFR SERPL CREATININE-BSD FRML MDRD: 61 ML/MIN/1.73SQ M
GLUCOSE SERPL-MCNC: 196 MG/DL (ref 65–140)
GLUCOSE SERPL-MCNC: 207 MG/DL (ref 65–140)
GLUCOSE SERPL-MCNC: 227 MG/DL (ref 65–140)
GLUCOSE SERPL-MCNC: 252 MG/DL (ref 65–140)
GLUCOSE SERPL-MCNC: 262 MG/DL (ref 65–140)
HCT VFR BLD AUTO: 46.8 % (ref 36.5–49.3)
HGB BLD-MCNC: 15.8 G/DL (ref 12–17)
IMM GRANULOCYTES # BLD AUTO: 0.03 THOUSAND/UL (ref 0–0.2)
IMM GRANULOCYTES NFR BLD AUTO: 0 % (ref 0–2)
LYMPHOCYTES # BLD AUTO: 3.48 THOUSANDS/ΜL (ref 0.6–4.47)
LYMPHOCYTES NFR BLD AUTO: 39 % (ref 14–44)
MAGNESIUM SERPL-MCNC: 2 MG/DL (ref 1.6–2.6)
MCH RBC QN AUTO: 33.9 PG (ref 26.8–34.3)
MCHC RBC AUTO-ENTMCNC: 33.8 G/DL (ref 31.4–37.4)
MCV RBC AUTO: 100 FL (ref 82–98)
MONOCYTES # BLD AUTO: 0.98 THOUSAND/ΜL (ref 0.17–1.22)
MONOCYTES NFR BLD AUTO: 11 % (ref 4–12)
NEUTROPHILS # BLD AUTO: 4.19 THOUSANDS/ΜL (ref 1.85–7.62)
NEUTS SEG NFR BLD AUTO: 47 % (ref 43–75)
NRBC BLD AUTO-RTO: 0 /100 WBCS
PLATELET # BLD AUTO: 204 THOUSANDS/UL (ref 149–390)
PMV BLD AUTO: 10.3 FL (ref 8.9–12.7)
POTASSIUM SERPL-SCNC: 4 MMOL/L (ref 3.5–5.3)
RBC # BLD AUTO: 4.66 MILLION/UL (ref 3.88–5.62)
SODIUM SERPL-SCNC: 140 MMOL/L (ref 136–145)
WBC # BLD AUTO: 8.92 THOUSAND/UL (ref 4.31–10.16)

## 2020-10-26 PROCEDURE — 83735 ASSAY OF MAGNESIUM: CPT | Performed by: INTERNAL MEDICINE

## 2020-10-26 PROCEDURE — 85025 COMPLETE CBC W/AUTO DIFF WBC: CPT | Performed by: INTERNAL MEDICINE

## 2020-10-26 PROCEDURE — 82948 REAGENT STRIP/BLOOD GLUCOSE: CPT

## 2020-10-26 PROCEDURE — 99232 SBSQ HOSP IP/OBS MODERATE 35: CPT | Performed by: STUDENT IN AN ORGANIZED HEALTH CARE EDUCATION/TRAINING PROGRAM

## 2020-10-26 PROCEDURE — 80048 BASIC METABOLIC PNL TOTAL CA: CPT | Performed by: INTERNAL MEDICINE

## 2020-10-26 PROCEDURE — 99233 SBSQ HOSP IP/OBS HIGH 50: CPT | Performed by: INTERNAL MEDICINE

## 2020-10-26 RX ORDER — CHLORDIAZEPOXIDE HYDROCHLORIDE 25 MG/1
25 CAPSULE, GELATIN COATED ORAL EVERY 6 HOURS SCHEDULED
Status: DISCONTINUED | OUTPATIENT
Start: 2020-10-26 | End: 2020-10-28 | Stop reason: HOSPADM

## 2020-10-26 RX ORDER — METHYLPREDNISOLONE 16 MG/1
32 TABLET ORAL
Status: DISCONTINUED | OUTPATIENT
Start: 2020-10-26 | End: 2020-10-27

## 2020-10-26 RX ORDER — DIPHENHYDRAMINE HCL 25 MG
50 TABLET ORAL
Status: DISCONTINUED | OUTPATIENT
Start: 2020-10-26 | End: 2020-10-27

## 2020-10-26 RX ORDER — INSULIN GLARGINE 100 [IU]/ML
7 INJECTION, SOLUTION SUBCUTANEOUS
Status: DISCONTINUED | OUTPATIENT
Start: 2020-10-26 | End: 2020-10-28

## 2020-10-26 RX ADMIN — INSULIN LISPRO 2 UNITS: 100 INJECTION, SOLUTION INTRAVENOUS; SUBCUTANEOUS at 21:37

## 2020-10-26 RX ADMIN — METHYLPREDNISOLONE 32 MG: 16 TABLET ORAL at 23:04

## 2020-10-26 RX ADMIN — SENNOSIDES 8.6 MG: 8.6 TABLET, FILM COATED ORAL at 10:17

## 2020-10-26 RX ADMIN — CHLORDIAZEPOXIDE HYDROCHLORIDE 25 MG: 25 CAPSULE ORAL at 12:32

## 2020-10-26 RX ADMIN — DOCUSATE SODIUM 100 MG: 100 CAPSULE, LIQUID FILLED ORAL at 17:26

## 2020-10-26 RX ADMIN — CHLORDIAZEPOXIDE HYDROCHLORIDE 25 MG: 25 CAPSULE ORAL at 23:07

## 2020-10-26 RX ADMIN — INSULIN LISPRO 2 UNITS: 100 INJECTION, SOLUTION INTRAVENOUS; SUBCUTANEOUS at 17:27

## 2020-10-26 RX ADMIN — DULOXETINE HYDROCHLORIDE 60 MG: 60 CAPSULE, DELAYED RELEASE ORAL at 10:17

## 2020-10-26 RX ADMIN — INSULIN LISPRO 2 UNITS: 100 INJECTION, SOLUTION INTRAVENOUS; SUBCUTANEOUS at 12:33

## 2020-10-26 RX ADMIN — ATORVASTATIN CALCIUM 20 MG: 20 TABLET, FILM COATED ORAL at 21:36

## 2020-10-26 RX ADMIN — INSULIN GLARGINE 7 UNITS: 100 INJECTION, SOLUTION SUBCUTANEOUS at 21:35

## 2020-10-26 RX ADMIN — CHLORDIAZEPOXIDE HYDROCHLORIDE 25 MG: 25 CAPSULE ORAL at 17:26

## 2020-10-26 RX ADMIN — METOPROLOL SUCCINATE 75 MG: 50 TABLET, EXTENDED RELEASE ORAL at 10:16

## 2020-10-26 RX ADMIN — CHLORDIAZEPOXIDE HYDROCHLORIDE 50 MG: 25 CAPSULE ORAL at 06:00

## 2020-10-26 RX ADMIN — DOCUSATE SODIUM 100 MG: 100 CAPSULE, LIQUID FILLED ORAL at 10:17

## 2020-10-26 RX ADMIN — INSULIN LISPRO 1 UNITS: 100 INJECTION, SOLUTION INTRAVENOUS; SUBCUTANEOUS at 07:38

## 2020-10-26 RX ADMIN — METOPROLOL SUCCINATE 75 MG: 50 TABLET, EXTENDED RELEASE ORAL at 20:49

## 2020-10-27 ENCOUNTER — APPOINTMENT (INPATIENT)
Dept: INTERVENTIONAL RADIOLOGY/VASCULAR | Facility: HOSPITAL | Age: 60
DRG: 287 | End: 2020-10-27
Payer: COMMERCIAL

## 2020-10-27 LAB
ANION GAP SERPL CALCULATED.3IONS-SCNC: 10 MMOL/L (ref 4–13)
BASOPHILS # BLD AUTO: 0.03 THOUSANDS/ΜL (ref 0–0.1)
BASOPHILS NFR BLD AUTO: 0 % (ref 0–1)
BUN SERPL-MCNC: 17 MG/DL (ref 5–25)
CALCIUM SERPL-MCNC: 9.7 MG/DL (ref 8.3–10.1)
CHLORIDE SERPL-SCNC: 99 MMOL/L (ref 100–108)
CO2 SERPL-SCNC: 26 MMOL/L (ref 21–32)
CREAT SERPL-MCNC: 1.09 MG/DL (ref 0.6–1.3)
EOSINOPHIL # BLD AUTO: 0.01 THOUSAND/ΜL (ref 0–0.61)
EOSINOPHIL NFR BLD AUTO: 0 % (ref 0–6)
ERYTHROCYTE [DISTWIDTH] IN BLOOD BY AUTOMATED COUNT: 12.7 % (ref 11.6–15.1)
GFR SERPL CREATININE-BSD FRML MDRD: 73 ML/MIN/1.73SQ M
GLUCOSE SERPL-MCNC: 237 MG/DL (ref 65–140)
GLUCOSE SERPL-MCNC: 254 MG/DL (ref 65–140)
GLUCOSE SERPL-MCNC: 265 MG/DL (ref 65–140)
GLUCOSE SERPL-MCNC: 294 MG/DL (ref 65–140)
GLUCOSE SERPL-MCNC: 317 MG/DL (ref 65–140)
HCT VFR BLD AUTO: 49.5 % (ref 36.5–49.3)
HGB BLD-MCNC: 17.1 G/DL (ref 12–17)
IMM GRANULOCYTES # BLD AUTO: 0.03 THOUSAND/UL (ref 0–0.2)
IMM GRANULOCYTES NFR BLD AUTO: 0 % (ref 0–2)
INR PPP: 1.05 (ref 0.84–1.19)
KCT BLD-ACNC: 255 SEC (ref 89–137)
LYMPHOCYTES # BLD AUTO: 1.43 THOUSANDS/ΜL (ref 0.6–4.47)
LYMPHOCYTES NFR BLD AUTO: 18 % (ref 14–44)
MCH RBC QN AUTO: 33.9 PG (ref 26.8–34.3)
MCHC RBC AUTO-ENTMCNC: 34.5 G/DL (ref 31.4–37.4)
MCV RBC AUTO: 98 FL (ref 82–98)
MONOCYTES # BLD AUTO: 0.23 THOUSAND/ΜL (ref 0.17–1.22)
MONOCYTES NFR BLD AUTO: 3 % (ref 4–12)
NEUTROPHILS # BLD AUTO: 6.1 THOUSANDS/ΜL (ref 1.85–7.62)
NEUTS SEG NFR BLD AUTO: 79 % (ref 43–75)
NRBC BLD AUTO-RTO: 0 /100 WBCS
PLATELET # BLD AUTO: 241 THOUSANDS/UL (ref 149–390)
PMV BLD AUTO: 10.6 FL (ref 8.9–12.7)
POTASSIUM SERPL-SCNC: 4.4 MMOL/L (ref 3.5–5.3)
PROTHROMBIN TIME: 13.9 SECONDS (ref 11.6–14.5)
RBC # BLD AUTO: 5.04 MILLION/UL (ref 3.88–5.62)
SODIUM SERPL-SCNC: 135 MMOL/L (ref 136–145)
SPECIMEN SOURCE: ABNORMAL
WBC # BLD AUTO: 7.83 THOUSAND/UL (ref 4.31–10.16)

## 2020-10-27 PROCEDURE — C1887 CATHETER, GUIDING: HCPCS | Performed by: PHYSICIAN ASSISTANT

## 2020-10-27 PROCEDURE — C1894 INTRO/SHEATH, NON-LASER: HCPCS | Performed by: PHYSICIAN ASSISTANT

## 2020-10-27 PROCEDURE — C1753 CATH, INTRAVAS ULTRASOUND: HCPCS | Performed by: PHYSICIAN ASSISTANT

## 2020-10-27 PROCEDURE — 4A023N7 MEASUREMENT OF CARDIAC SAMPLING AND PRESSURE, LEFT HEART, PERCUTANEOUS APPROACH: ICD-10-PCS | Performed by: INTERNAL MEDICINE

## 2020-10-27 PROCEDURE — 99152 MOD SED SAME PHYS/QHP 5/>YRS: CPT | Performed by: PHYSICIAN ASSISTANT

## 2020-10-27 PROCEDURE — 99152 MOD SED SAME PHYS/QHP 5/>YRS: CPT | Performed by: INTERNAL MEDICINE

## 2020-10-27 PROCEDURE — C1769 GUIDE WIRE: HCPCS | Performed by: PHYSICIAN ASSISTANT

## 2020-10-27 PROCEDURE — B215YZZ FLUOROSCOPY OF LEFT HEART USING OTHER CONTRAST: ICD-10-PCS | Performed by: INTERNAL MEDICINE

## 2020-10-27 PROCEDURE — 82948 REAGENT STRIP/BLOOD GLUCOSE: CPT

## 2020-10-27 PROCEDURE — 93571 IV DOP VEL&/PRESS C FLO 1ST: CPT | Performed by: INTERNAL MEDICINE

## 2020-10-27 PROCEDURE — 80048 BASIC METABOLIC PNL TOTAL CA: CPT | Performed by: INTERNAL MEDICINE

## 2020-10-27 PROCEDURE — 92978 ENDOLUMINL IVUS OCT C 1ST: CPT | Performed by: PHYSICIAN ASSISTANT

## 2020-10-27 PROCEDURE — 99232 SBSQ HOSP IP/OBS MODERATE 35: CPT | Performed by: INTERNAL MEDICINE

## 2020-10-27 PROCEDURE — 92978 ENDOLUMINL IVUS OCT C 1ST: CPT | Performed by: INTERNAL MEDICINE

## 2020-10-27 PROCEDURE — 85025 COMPLETE CBC W/AUTO DIFF WBC: CPT | Performed by: INTERNAL MEDICINE

## 2020-10-27 PROCEDURE — B211YZZ FLUOROSCOPY OF MULTIPLE CORONARY ARTERIES USING OTHER CONTRAST: ICD-10-PCS | Performed by: INTERNAL MEDICINE

## 2020-10-27 PROCEDURE — 93458 L HRT ARTERY/VENTRICLE ANGIO: CPT | Performed by: INTERNAL MEDICINE

## 2020-10-27 PROCEDURE — 93571 IV DOP VEL&/PRESS C FLO 1ST: CPT | Performed by: PHYSICIAN ASSISTANT

## 2020-10-27 PROCEDURE — 4A033BC MEASUREMENT OF ARTERIAL PRESSURE, CORONARY, PERCUTANEOUS APPROACH: ICD-10-PCS | Performed by: INTERNAL MEDICINE

## 2020-10-27 PROCEDURE — 85610 PROTHROMBIN TIME: CPT | Performed by: INTERNAL MEDICINE

## 2020-10-27 PROCEDURE — 93458 L HRT ARTERY/VENTRICLE ANGIO: CPT | Performed by: PHYSICIAN ASSISTANT

## 2020-10-27 PROCEDURE — 99153 MOD SED SAME PHYS/QHP EA: CPT | Performed by: PHYSICIAN ASSISTANT

## 2020-10-27 PROCEDURE — 85347 COAGULATION TIME ACTIVATED: CPT

## 2020-10-27 RX ORDER — LIDOCAINE WITH 8.4% SOD BICARB 0.9%(10ML)
SYRINGE (ML) INJECTION CODE/TRAUMA/SEDATION MEDICATION
Status: COMPLETED | OUTPATIENT
Start: 2020-10-27 | End: 2020-10-27

## 2020-10-27 RX ORDER — METOPROLOL SUCCINATE 100 MG/1
100 TABLET, EXTENDED RELEASE ORAL 2 TIMES DAILY
Status: DISCONTINUED | OUTPATIENT
Start: 2020-10-27 | End: 2020-10-27

## 2020-10-27 RX ORDER — METHYLPREDNISOLONE 16 MG/1
32 TABLET ORAL
Status: DISCONTINUED | OUTPATIENT
Start: 2020-10-27 | End: 2020-10-27 | Stop reason: SDUPTHER

## 2020-10-27 RX ORDER — DIPHENHYDRAMINE HCL 25 MG
50 TABLET ORAL
Status: DISCONTINUED | OUTPATIENT
Start: 2020-10-27 | End: 2020-10-28 | Stop reason: HOSPADM

## 2020-10-27 RX ORDER — FENTANYL CITRATE 50 UG/ML
INJECTION, SOLUTION INTRAMUSCULAR; INTRAVENOUS CODE/TRAUMA/SEDATION MEDICATION
Status: COMPLETED | OUTPATIENT
Start: 2020-10-27 | End: 2020-10-27

## 2020-10-27 RX ORDER — HEPARIN SODIUM 1000 [USP'U]/ML
INJECTION, SOLUTION INTRAVENOUS; SUBCUTANEOUS CODE/TRAUMA/SEDATION MEDICATION
Status: COMPLETED | OUTPATIENT
Start: 2020-10-27 | End: 2020-10-27

## 2020-10-27 RX ORDER — LISINOPRIL 20 MG/1
20 TABLET ORAL DAILY
Status: DISCONTINUED | OUTPATIENT
Start: 2020-10-27 | End: 2020-10-28 | Stop reason: HOSPADM

## 2020-10-27 RX ORDER — SODIUM CHLORIDE 9 MG/ML
50 INJECTION, SOLUTION INTRAVENOUS CONTINUOUS
Status: DISCONTINUED | OUTPATIENT
Start: 2020-10-27 | End: 2020-10-28 | Stop reason: HOSPADM

## 2020-10-27 RX ORDER — METHYLPREDNISOLONE 16 MG/1
32 TABLET ORAL
Status: COMPLETED | OUTPATIENT
Start: 2020-10-27 | End: 2020-10-27

## 2020-10-27 RX ORDER — VERAPAMIL HCL 2.5 MG/ML
AMPUL (ML) INTRAVENOUS CODE/TRAUMA/SEDATION MEDICATION
Status: COMPLETED | OUTPATIENT
Start: 2020-10-27 | End: 2020-10-27

## 2020-10-27 RX ORDER — LISINOPRIL 5 MG/1
5 TABLET ORAL DAILY
Status: DISCONTINUED | OUTPATIENT
Start: 2020-10-27 | End: 2020-10-27

## 2020-10-27 RX ORDER — NITROGLYCERIN 20 MG/100ML
INJECTION INTRAVENOUS CODE/TRAUMA/SEDATION MEDICATION
Status: COMPLETED | OUTPATIENT
Start: 2020-10-27 | End: 2020-10-27

## 2020-10-27 RX ORDER — MIDAZOLAM HYDROCHLORIDE 2 MG/2ML
INJECTION, SOLUTION INTRAMUSCULAR; INTRAVENOUS CODE/TRAUMA/SEDATION MEDICATION
Status: COMPLETED | OUTPATIENT
Start: 2020-10-27 | End: 2020-10-27

## 2020-10-27 RX ORDER — METOPROLOL TARTRATE 5 MG/5ML
5 INJECTION INTRAVENOUS ONCE
Status: COMPLETED | OUTPATIENT
Start: 2020-10-27 | End: 2020-10-27

## 2020-10-27 RX ORDER — ASPIRIN 81 MG/1
81 TABLET, CHEWABLE ORAL DAILY
Status: DISCONTINUED | OUTPATIENT
Start: 2020-10-28 | End: 2020-10-28 | Stop reason: HOSPADM

## 2020-10-27 RX ORDER — ASPIRIN 81 MG/1
324 TABLET, CHEWABLE ORAL ONCE
Status: COMPLETED | OUTPATIENT
Start: 2020-10-27 | End: 2020-10-27

## 2020-10-27 RX ORDER — METOPROLOL SUCCINATE 100 MG/1
100 TABLET, EXTENDED RELEASE ORAL 2 TIMES DAILY
Status: DISCONTINUED | OUTPATIENT
Start: 2020-10-27 | End: 2020-10-28 | Stop reason: HOSPADM

## 2020-10-27 RX ADMIN — CHLORDIAZEPOXIDE HYDROCHLORIDE 25 MG: 25 CAPSULE ORAL at 11:40

## 2020-10-27 RX ADMIN — METHYLPREDNISOLONE 32 MG: 16 TABLET ORAL at 07:53

## 2020-10-27 RX ADMIN — CHLORDIAZEPOXIDE HYDROCHLORIDE 25 MG: 25 CAPSULE ORAL at 05:52

## 2020-10-27 RX ADMIN — FENTANYL CITRATE 50 MCG: 50 INJECTION, SOLUTION INTRAMUSCULAR; INTRAVENOUS at 15:28

## 2020-10-27 RX ADMIN — INSULIN LISPRO 3 UNITS: 100 INJECTION, SOLUTION INTRAVENOUS; SUBCUTANEOUS at 07:54

## 2020-10-27 RX ADMIN — HEPARIN SODIUM 5000 UNITS: 1000 INJECTION INTRAVENOUS; SUBCUTANEOUS at 14:51

## 2020-10-27 RX ADMIN — ATORVASTATIN CALCIUM 20 MG: 20 TABLET, FILM COATED ORAL at 21:35

## 2020-10-27 RX ADMIN — HEPARIN SODIUM 1000 UNITS: 1000 INJECTION INTRAVENOUS; SUBCUTANEOUS at 15:21

## 2020-10-27 RX ADMIN — METOROPROLOL TARTRATE 5 MG: 5 INJECTION, SOLUTION INTRAVENOUS at 08:58

## 2020-10-27 RX ADMIN — INSULIN GLARGINE 7 UNITS: 100 INJECTION, SOLUTION SUBCUTANEOUS at 21:35

## 2020-10-27 RX ADMIN — DOCUSATE SODIUM 100 MG: 100 CAPSULE, LIQUID FILLED ORAL at 08:00

## 2020-10-27 RX ADMIN — FENTANYL CITRATE 50 MCG: 50 INJECTION, SOLUTION INTRAMUSCULAR; INTRAVENOUS at 15:25

## 2020-10-27 RX ADMIN — Medication 2 ML: at 14:46

## 2020-10-27 RX ADMIN — VERAPAMIL HYDROCHLORIDE 2.5 MG: 2.5 INJECTION, SOLUTION INTRAVENOUS at 14:50

## 2020-10-27 RX ADMIN — INSULIN LISPRO 2 UNITS: 100 INJECTION, SOLUTION INTRAVENOUS; SUBCUTANEOUS at 21:39

## 2020-10-27 RX ADMIN — DOCUSATE SODIUM 100 MG: 100 CAPSULE, LIQUID FILLED ORAL at 17:01

## 2020-10-27 RX ADMIN — MIDAZOLAM HYDROCHLORIDE 1 MG: 1 INJECTION, SOLUTION INTRAMUSCULAR; INTRAVENOUS at 15:27

## 2020-10-27 RX ADMIN — LISINOPRIL 20 MG: 20 TABLET ORAL at 12:59

## 2020-10-27 RX ADMIN — NITROGLYCERIN 200 MCG: 20 INJECTION INTRAVENOUS at 14:50

## 2020-10-27 RX ADMIN — FENTANYL CITRATE 50 MCG: 50 INJECTION, SOLUTION INTRAMUSCULAR; INTRAVENOUS at 15:29

## 2020-10-27 RX ADMIN — METOPROLOL SUCCINATE 75 MG: 50 TABLET, EXTENDED RELEASE ORAL at 08:00

## 2020-10-27 RX ADMIN — METOPROLOL SUCCINATE 100 MG: 100 TABLET, EXTENDED RELEASE ORAL at 20:23

## 2020-10-27 RX ADMIN — DULOXETINE HYDROCHLORIDE 60 MG: 60 CAPSULE, DELAYED RELEASE ORAL at 08:00

## 2020-10-27 RX ADMIN — HEPARIN SODIUM 3000 UNITS: 1000 INJECTION INTRAVENOUS; SUBCUTANEOUS at 14:59

## 2020-10-27 RX ADMIN — ASPIRIN 324 MG: 81 TABLET, CHEWABLE ORAL at 12:59

## 2020-10-27 RX ADMIN — SENNOSIDES 8.6 MG: 8.6 TABLET, FILM COATED ORAL at 08:00

## 2020-10-27 RX ADMIN — INSULIN LISPRO 2 UNITS: 100 INJECTION, SOLUTION INTRAVENOUS; SUBCUTANEOUS at 11:41

## 2020-10-27 RX ADMIN — INSULIN LISPRO 2 UNITS: 100 INJECTION, SOLUTION INTRAVENOUS; SUBCUTANEOUS at 17:16

## 2020-10-27 RX ADMIN — CHLORDIAZEPOXIDE HYDROCHLORIDE 25 MG: 25 CAPSULE ORAL at 17:01

## 2020-10-27 RX ADMIN — SODIUM CHLORIDE 50 ML/HR: 0.9 INJECTION, SOLUTION INTRAVENOUS at 17:13

## 2020-10-27 RX ADMIN — IODIXANOL 90 ML: 320 INJECTION, SOLUTION INTRAVASCULAR at 15:45

## 2020-10-28 ENCOUNTER — HOSPITAL ENCOUNTER (INPATIENT)
Facility: HOSPITAL | Age: 60
LOS: 3 days | Discharge: HOME | DRG: 247 | End: 2020-10-31
Attending: INTERNAL MEDICINE | Admitting: INTERNAL MEDICINE
Payer: COMMERCIAL

## 2020-10-28 VITALS
BODY MASS INDEX: 39.73 KG/M2 | HEIGHT: 68 IN | DIASTOLIC BLOOD PRESSURE: 88 MMHG | OXYGEN SATURATION: 97 % | RESPIRATION RATE: 18 BRPM | WEIGHT: 262.13 LBS | SYSTOLIC BLOOD PRESSURE: 125 MMHG | TEMPERATURE: 97.3 F | HEART RATE: 63 BPM

## 2020-10-28 DIAGNOSIS — I42.9 CARDIOMYOPATHY, UNSPECIFIED TYPE (HCC): ICD-10-CM

## 2020-10-28 DIAGNOSIS — I48.11 LONGSTANDING PERSISTENT ATRIAL FIBRILLATION (HCC): ICD-10-CM

## 2020-10-28 DIAGNOSIS — I25.10 CORONARY ARTERY DISEASE: Primary | ICD-10-CM

## 2020-10-28 PROBLEM — I48.91 ATRIAL FIBRILLATION (HCC): Status: ACTIVE | Noted: 2020-10-28

## 2020-10-28 LAB
ANION GAP SERPL CALCULATED.3IONS-SCNC: 9 MMOL/L (ref 4–13)
APTT PPP: 26 SECONDS (ref 23–37)
APTT PPP: 27 SECONDS (ref 23–37)
APTT PPP: 49 SECONDS (ref 23–37)
BASOPHILS # BLD AUTO: 0.05 THOUSANDS/ΜL (ref 0–0.1)
BASOPHILS NFR BLD AUTO: 0 % (ref 0–1)
BUN SERPL-MCNC: 17 MG/DL (ref 5–25)
CALCIUM SERPL-MCNC: 8.9 MG/DL (ref 8.3–10.1)
CHLORIDE SERPL-SCNC: 101 MMOL/L (ref 100–108)
CO2 SERPL-SCNC: 26 MMOL/L (ref 21–32)
CREAT SERPL-MCNC: 1.04 MG/DL (ref 0.6–1.3)
EOSINOPHIL # BLD AUTO: 0.12 THOUSAND/ΜL (ref 0–0.61)
EOSINOPHIL NFR BLD AUTO: 1 % (ref 0–6)
ERYTHROCYTE [DISTWIDTH] IN BLOOD BY AUTOMATED COUNT: 13.1 % (ref 11.6–15.1)
GFR SERPL CREATININE-BSD FRML MDRD: 77 ML/MIN/1.73SQ M
GLUCOSE SERPL-MCNC: 237 MG/DL (ref 65–140)
GLUCOSE SERPL-MCNC: 256 MG/DL (ref 65–140)
GLUCOSE SERPL-MCNC: 263 MG/DL (ref 65–140)
GLUCOSE SERPL-MCNC: 382 MG/DL (ref 65–140)
GLUCOSE SERPL-MCNC: 408 MG/DL (ref 65–140)
HCT VFR BLD AUTO: 45.7 % (ref 36.5–49.3)
HGB BLD-MCNC: 15.4 G/DL (ref 12–17)
IMM GRANULOCYTES # BLD AUTO: 0.04 THOUSAND/UL (ref 0–0.2)
IMM GRANULOCYTES NFR BLD AUTO: 0 % (ref 0–2)
LYMPHOCYTES # BLD AUTO: 3.79 THOUSANDS/ΜL (ref 0.6–4.47)
LYMPHOCYTES NFR BLD AUTO: 28 % (ref 14–44)
MCH RBC QN AUTO: 33.9 PG (ref 26.8–34.3)
MCHC RBC AUTO-ENTMCNC: 33.7 G/DL (ref 31.4–37.4)
MCV RBC AUTO: 101 FL (ref 82–98)
MONOCYTES # BLD AUTO: 1.41 THOUSAND/ΜL (ref 0.17–1.22)
MONOCYTES NFR BLD AUTO: 11 % (ref 4–12)
NEUTROPHILS # BLD AUTO: 7.93 THOUSANDS/ΜL (ref 1.85–7.62)
NEUTS SEG NFR BLD AUTO: 60 % (ref 43–75)
NRBC BLD AUTO-RTO: 0 /100 WBCS
PLATELET # BLD AUTO: 232 THOUSANDS/UL (ref 149–390)
PMV BLD AUTO: 10.5 FL (ref 8.9–12.7)
POTASSIUM SERPL-SCNC: 3.9 MMOL/L (ref 3.5–5.3)
RBC # BLD AUTO: 4.54 MILLION/UL (ref 3.88–5.62)
SODIUM SERPL-SCNC: 136 MMOL/L (ref 136–145)
WBC # BLD AUTO: 13.34 THOUSAND/UL (ref 4.31–10.16)

## 2020-10-28 PROCEDURE — NC001 PR NO CHARGE: Performed by: INTERNAL MEDICINE

## 2020-10-28 PROCEDURE — 99239 HOSP IP/OBS DSCHRG MGMT >30: CPT | Performed by: GENERAL PRACTICE

## 2020-10-28 PROCEDURE — 85025 COMPLETE CBC W/AUTO DIFF WBC: CPT | Performed by: INTERNAL MEDICINE

## 2020-10-28 PROCEDURE — 85730 THROMBOPLASTIN TIME PARTIAL: CPT | Performed by: INTERNAL MEDICINE

## 2020-10-28 PROCEDURE — 99223 1ST HOSP IP/OBS HIGH 75: CPT | Performed by: INTERNAL MEDICINE

## 2020-10-28 PROCEDURE — 82948 REAGENT STRIP/BLOOD GLUCOSE: CPT

## 2020-10-28 PROCEDURE — 80048 BASIC METABOLIC PNL TOTAL CA: CPT | Performed by: INTERNAL MEDICINE

## 2020-10-28 RX ORDER — ACETAMINOPHEN 325 MG/1
650 TABLET ORAL EVERY 6 HOURS PRN
Status: DISCONTINUED | OUTPATIENT
Start: 2020-10-28 | End: 2020-10-30 | Stop reason: SDUPTHER

## 2020-10-28 RX ORDER — ONDANSETRON 2 MG/ML
4 INJECTION INTRAMUSCULAR; INTRAVENOUS EVERY 6 HOURS PRN
Status: DISCONTINUED | OUTPATIENT
Start: 2020-10-28 | End: 2020-10-31 | Stop reason: HOSPADM

## 2020-10-28 RX ORDER — DULOXETIN HYDROCHLORIDE 60 MG/1
60 CAPSULE, DELAYED RELEASE ORAL DAILY
Status: DISCONTINUED | OUTPATIENT
Start: 2020-10-29 | End: 2020-10-31 | Stop reason: HOSPADM

## 2020-10-28 RX ORDER — CHLORDIAZEPOXIDE HYDROCHLORIDE 25 MG/1
25 CAPSULE, GELATIN COATED ORAL EVERY 6 HOURS SCHEDULED
Status: DISCONTINUED | OUTPATIENT
Start: 2020-10-28 | End: 2020-10-29

## 2020-10-28 RX ORDER — INSULIN GLARGINE 100 [IU]/ML
10 INJECTION, SOLUTION SUBCUTANEOUS
Status: DISCONTINUED | OUTPATIENT
Start: 2020-10-28 | End: 2020-10-28 | Stop reason: HOSPADM

## 2020-10-28 RX ORDER — LORAZEPAM 2 MG/ML
1 INJECTION INTRAMUSCULAR EVERY 6 HOURS PRN
Status: DISCONTINUED | OUTPATIENT
Start: 2020-10-28 | End: 2020-10-31 | Stop reason: HOSPADM

## 2020-10-28 RX ORDER — SENNOSIDES 8.6 MG
1 TABLET ORAL DAILY
Status: DISCONTINUED | OUTPATIENT
Start: 2020-10-29 | End: 2020-10-31 | Stop reason: HOSPADM

## 2020-10-28 RX ORDER — ATORVASTATIN CALCIUM 20 MG/1
20 TABLET, FILM COATED ORAL
Status: DISCONTINUED | OUTPATIENT
Start: 2020-10-28 | End: 2020-10-30

## 2020-10-28 RX ORDER — METOPROLOL SUCCINATE 100 MG/1
100 TABLET, EXTENDED RELEASE ORAL 2 TIMES DAILY
Status: DISCONTINUED | OUTPATIENT
Start: 2020-10-28 | End: 2020-10-31 | Stop reason: HOSPADM

## 2020-10-28 RX ORDER — SODIUM CHLORIDE 9 MG/ML
50 INJECTION, SOLUTION INTRAVENOUS CONTINUOUS
Status: DISCONTINUED | OUTPATIENT
Start: 2020-10-28 | End: 2020-10-29

## 2020-10-28 RX ORDER — INSULIN GLARGINE 100 [IU]/ML
10 INJECTION, SOLUTION SUBCUTANEOUS
Status: DISCONTINUED | OUTPATIENT
Start: 2020-10-28 | End: 2020-10-30

## 2020-10-28 RX ORDER — DIPHENHYDRAMINE HCL 25 MG
50 TABLET ORAL
Status: DISCONTINUED | OUTPATIENT
Start: 2020-10-28 | End: 2020-10-30 | Stop reason: SDUPTHER

## 2020-10-28 RX ORDER — DOCUSATE SODIUM 100 MG/1
100 CAPSULE, LIQUID FILLED ORAL 2 TIMES DAILY
Status: DISCONTINUED | OUTPATIENT
Start: 2020-10-28 | End: 2020-10-31 | Stop reason: HOSPADM

## 2020-10-28 RX ORDER — LISINOPRIL 20 MG/1
20 TABLET ORAL DAILY
Status: DISCONTINUED | OUTPATIENT
Start: 2020-10-29 | End: 2020-10-31 | Stop reason: HOSPADM

## 2020-10-28 RX ORDER — METOPROLOL TARTRATE 5 MG/5ML
5 INJECTION INTRAVENOUS EVERY 6 HOURS PRN
Status: DISCONTINUED | OUTPATIENT
Start: 2020-10-28 | End: 2020-10-31 | Stop reason: HOSPADM

## 2020-10-28 RX ORDER — HEPARIN SODIUM 10000 [USP'U]/100ML
3-20 INJECTION, SOLUTION INTRAVENOUS
Status: DISCONTINUED | OUTPATIENT
Start: 2020-10-28 | End: 2020-10-30

## 2020-10-28 RX ORDER — ASPIRIN 81 MG/1
81 TABLET, CHEWABLE ORAL DAILY
Status: DISCONTINUED | OUTPATIENT
Start: 2020-10-29 | End: 2020-10-31 | Stop reason: HOSPADM

## 2020-10-28 RX ORDER — HEPARIN SODIUM 10000 [USP'U]/100ML
3-20 INJECTION, SOLUTION INTRAVENOUS
Status: DISCONTINUED | OUTPATIENT
Start: 2020-10-28 | End: 2020-10-28 | Stop reason: HOSPADM

## 2020-10-28 RX ORDER — CALCIUM CARBONATE 200(500)MG
1000 TABLET,CHEWABLE ORAL DAILY PRN
Status: DISCONTINUED | OUTPATIENT
Start: 2020-10-28 | End: 2020-10-31 | Stop reason: HOSPADM

## 2020-10-28 RX ADMIN — CHLORDIAZEPOXIDE HYDROCHLORIDE 25 MG: 25 CAPSULE ORAL at 03:14

## 2020-10-28 RX ADMIN — HEPARIN SODIUM 11.1 UNITS/KG/HR: 10000 INJECTION, SOLUTION INTRAVENOUS at 06:51

## 2020-10-28 RX ADMIN — ATORVASTATIN CALCIUM 20 MG: 20 TABLET, FILM COATED ORAL at 23:46

## 2020-10-28 RX ADMIN — METOPROLOL SUCCINATE 100 MG: 100 TABLET, EXTENDED RELEASE ORAL at 10:18

## 2020-10-28 RX ADMIN — INSULIN GLARGINE 10 UNITS: 100 INJECTION, SOLUTION SUBCUTANEOUS at 23:48

## 2020-10-28 RX ADMIN — DULOXETINE HYDROCHLORIDE 60 MG: 60 CAPSULE, DELAYED RELEASE ORAL at 10:19

## 2020-10-28 RX ADMIN — CHLORDIAZEPOXIDE HYDROCHLORIDE 25 MG: 25 CAPSULE ORAL at 06:58

## 2020-10-28 RX ADMIN — INSULIN LISPRO 1 UNITS: 100 INJECTION, SOLUTION INTRAVENOUS; SUBCUTANEOUS at 07:06

## 2020-10-28 RX ADMIN — INSULIN LISPRO 4 UNITS: 100 INJECTION, SOLUTION INTRAVENOUS; SUBCUTANEOUS at 23:47

## 2020-10-28 RX ADMIN — DOCUSATE SODIUM 100 MG: 100 CAPSULE, LIQUID FILLED ORAL at 10:18

## 2020-10-28 RX ADMIN — METOPROLOL SUCCINATE 100 MG: 100 TABLET, EXTENDED RELEASE ORAL at 20:44

## 2020-10-28 RX ADMIN — LISINOPRIL 20 MG: 20 TABLET ORAL at 10:18

## 2020-10-28 RX ADMIN — INSULIN LISPRO 2 UNITS: 100 INJECTION, SOLUTION INTRAVENOUS; SUBCUTANEOUS at 12:22

## 2020-10-28 RX ADMIN — ASPIRIN 81 MG: 81 TABLET, CHEWABLE ORAL at 10:18

## 2020-10-28 RX ADMIN — CHLORDIAZEPOXIDE HYDROCHLORIDE 25 MG: 25 CAPSULE ORAL at 20:45

## 2020-10-28 RX ADMIN — SODIUM CHLORIDE 50 ML/HR: 0.9 INJECTION, SOLUTION INTRAVENOUS at 20:45

## 2020-10-28 RX ADMIN — DOCUSATE SODIUM 100 MG: 100 CAPSULE ORAL at 20:45

## 2020-10-28 RX ADMIN — CHLORDIAZEPOXIDE HYDROCHLORIDE 25 MG: 25 CAPSULE ORAL at 12:22

## 2020-10-28 RX ADMIN — SENNOSIDES 8.6 MG: 8.6 TABLET, FILM COATED ORAL at 10:18

## 2020-10-28 RX ADMIN — HEPARIN SODIUM 15.1 UNITS/KG/HR: 10000 INJECTION, SOLUTION INTRAVENOUS at 15:28

## 2020-10-28 RX ADMIN — SODIUM CHLORIDE 50 ML/HR: 0.9 INJECTION, SOLUTION INTRAVENOUS at 07:03

## 2020-10-29 ENCOUNTER — APPOINTMENT (INPATIENT)
Dept: NON INVASIVE DIAGNOSTICS | Facility: HOSPITAL | Age: 60
DRG: 247 | End: 2020-10-29
Payer: COMMERCIAL

## 2020-10-29 LAB
ANION GAP SERPL CALCULATED.3IONS-SCNC: 6 MMOL/L (ref 4–13)
APTT PPP: 67 SECONDS (ref 23–37)
APTT PPP: 70 SECONDS (ref 23–37)
BACTERIA UR QL AUTO: NORMAL /HPF
BASOPHILS # BLD AUTO: 0.07 THOUSANDS/ΜL (ref 0–0.1)
BASOPHILS NFR BLD AUTO: 1 % (ref 0–1)
BILIRUB UR QL STRIP: NEGATIVE
BUN SERPL-MCNC: 16 MG/DL (ref 5–25)
CALCIUM SERPL-MCNC: 8.6 MG/DL (ref 8.3–10.1)
CHLORIDE SERPL-SCNC: 105 MMOL/L (ref 100–108)
CHOLEST SERPL-MCNC: 138 MG/DL (ref 50–200)
CLARITY UR: CLEAR
CO2 SERPL-SCNC: 26 MMOL/L (ref 21–32)
COLOR UR: YELLOW
CREAT SERPL-MCNC: 1.07 MG/DL (ref 0.6–1.3)
EOSINOPHIL # BLD AUTO: 0.16 THOUSAND/ΜL (ref 0–0.61)
EOSINOPHIL NFR BLD AUTO: 2 % (ref 0–6)
ERYTHROCYTE [DISTWIDTH] IN BLOOD BY AUTOMATED COUNT: 13 % (ref 11.6–15.1)
GFR SERPL CREATININE-BSD FRML MDRD: 75 ML/MIN/1.73SQ M
GLUCOSE SERPL-MCNC: 187 MG/DL (ref 65–140)
GLUCOSE SERPL-MCNC: 212 MG/DL (ref 65–140)
GLUCOSE SERPL-MCNC: 273 MG/DL (ref 65–140)
GLUCOSE SERPL-MCNC: 282 MG/DL (ref 65–140)
GLUCOSE SERPL-MCNC: 297 MG/DL (ref 65–140)
GLUCOSE UR STRIP-MCNC: ABNORMAL MG/DL
HCT VFR BLD AUTO: 45.9 % (ref 36.5–49.3)
HDLC SERPL-MCNC: 45 MG/DL
HGB BLD-MCNC: 15.2 G/DL (ref 12–17)
HGB UR QL STRIP.AUTO: NEGATIVE
HYALINE CASTS #/AREA URNS LPF: NORMAL /LPF
IMM GRANULOCYTES # BLD AUTO: 0.02 THOUSAND/UL (ref 0–0.2)
IMM GRANULOCYTES NFR BLD AUTO: 0 % (ref 0–2)
KETONES UR STRIP-MCNC: NEGATIVE MG/DL
LDLC SERPL CALC-MCNC: 50 MG/DL (ref 0–100)
LEUKOCYTE ESTERASE UR QL STRIP: ABNORMAL
LYMPHOCYTES # BLD AUTO: 3.41 THOUSANDS/ΜL (ref 0.6–4.47)
LYMPHOCYTES NFR BLD AUTO: 39 % (ref 14–44)
MCH RBC QN AUTO: 33.3 PG (ref 26.8–34.3)
MCHC RBC AUTO-ENTMCNC: 33.1 G/DL (ref 31.4–37.4)
MCV RBC AUTO: 101 FL (ref 82–98)
MONOCYTES # BLD AUTO: 0.98 THOUSAND/ΜL (ref 0.17–1.22)
MONOCYTES NFR BLD AUTO: 11 % (ref 4–12)
NEUTROPHILS # BLD AUTO: 4.15 THOUSANDS/ΜL (ref 1.85–7.62)
NEUTS SEG NFR BLD AUTO: 47 % (ref 43–75)
NITRITE UR QL STRIP: NEGATIVE
NON-SQ EPI CELLS URNS QL MICRO: NORMAL /HPF
NONHDLC SERPL-MCNC: 93 MG/DL
NRBC BLD AUTO-RTO: 0 /100 WBCS
PH UR STRIP.AUTO: 6 [PH]
PLATELET # BLD AUTO: 217 THOUSANDS/UL (ref 149–390)
PMV BLD AUTO: 10.6 FL (ref 8.9–12.7)
POTASSIUM SERPL-SCNC: 3.9 MMOL/L (ref 3.5–5.3)
PROT UR STRIP-MCNC: NEGATIVE MG/DL
RBC # BLD AUTO: 4.56 MILLION/UL (ref 3.88–5.62)
RBC #/AREA URNS AUTO: NORMAL /HPF
SODIUM SERPL-SCNC: 137 MMOL/L (ref 136–145)
SP GR UR STRIP.AUTO: 1.02 (ref 1–1.03)
TRIGL SERPL-MCNC: 214 MG/DL
UROBILINOGEN UR QL STRIP.AUTO: 0.2 E.U./DL
WBC # BLD AUTO: 8.79 THOUSAND/UL (ref 4.31–10.16)
WBC #/AREA URNS AUTO: NORMAL /HPF

## 2020-10-29 PROCEDURE — 85730 THROMBOPLASTIN TIME PARTIAL: CPT | Performed by: INTERNAL MEDICINE

## 2020-10-29 PROCEDURE — 80048 BASIC METABOLIC PNL TOTAL CA: CPT | Performed by: INTERNAL MEDICINE

## 2020-10-29 PROCEDURE — 87081 CULTURE SCREEN ONLY: CPT | Performed by: PHYSICIAN ASSISTANT

## 2020-10-29 PROCEDURE — 99255 IP/OBS CONSLTJ NEW/EST HI 80: CPT | Performed by: THORACIC SURGERY (CARDIOTHORACIC VASCULAR SURGERY)

## 2020-10-29 PROCEDURE — 94760 N-INVAS EAR/PLS OXIMETRY 1: CPT

## 2020-10-29 PROCEDURE — 82948 REAGENT STRIP/BLOOD GLUCOSE: CPT

## 2020-10-29 PROCEDURE — 93880 EXTRACRANIAL BILAT STUDY: CPT | Performed by: SURGERY

## 2020-10-29 PROCEDURE — 93880 EXTRACRANIAL BILAT STUDY: CPT

## 2020-10-29 PROCEDURE — 81001 URINALYSIS AUTO W/SCOPE: CPT | Performed by: PHYSICIAN ASSISTANT

## 2020-10-29 PROCEDURE — 80061 LIPID PANEL: CPT | Performed by: PHYSICIAN ASSISTANT

## 2020-10-29 PROCEDURE — 99232 SBSQ HOSP IP/OBS MODERATE 35: CPT | Performed by: NURSE PRACTITIONER

## 2020-10-29 PROCEDURE — 93971 EXTREMITY STUDY: CPT

## 2020-10-29 PROCEDURE — 99232 SBSQ HOSP IP/OBS MODERATE 35: CPT | Performed by: PHYSICIAN ASSISTANT

## 2020-10-29 PROCEDURE — 93971 EXTREMITY STUDY: CPT | Performed by: SURGERY

## 2020-10-29 PROCEDURE — 85025 COMPLETE CBC W/AUTO DIFF WBC: CPT | Performed by: INTERNAL MEDICINE

## 2020-10-29 RX ORDER — CHLORDIAZEPOXIDE HYDROCHLORIDE 25 MG/1
25 CAPSULE, GELATIN COATED ORAL EVERY 8 HOURS SCHEDULED
Status: DISCONTINUED | OUTPATIENT
Start: 2020-10-29 | End: 2020-10-30

## 2020-10-29 RX ORDER — CHLORHEXIDINE GLUCONATE 0.12 MG/ML
15 RINSE ORAL EVERY 12 HOURS SCHEDULED
Status: DISCONTINUED | OUTPATIENT
Start: 2020-10-29 | End: 2020-10-31 | Stop reason: HOSPADM

## 2020-10-29 RX ADMIN — LISINOPRIL 20 MG: 20 TABLET ORAL at 09:37

## 2020-10-29 RX ADMIN — METOPROLOL SUCCINATE 100 MG: 100 TABLET, EXTENDED RELEASE ORAL at 21:41

## 2020-10-29 RX ADMIN — CHLORDIAZEPOXIDE HYDROCHLORIDE 25 MG: 25 CAPSULE ORAL at 06:13

## 2020-10-29 RX ADMIN — HEPARIN SODIUM 17.2 UNITS/KG/HR: 10000 INJECTION, SOLUTION INTRAVENOUS at 21:38

## 2020-10-29 RX ADMIN — INSULIN LISPRO 1 UNITS: 100 INJECTION, SOLUTION INTRAVENOUS; SUBCUTANEOUS at 17:24

## 2020-10-29 RX ADMIN — INSULIN LISPRO 4 UNITS: 100 INJECTION, SOLUTION INTRAVENOUS; SUBCUTANEOUS at 14:44

## 2020-10-29 RX ADMIN — CHLORHEXIDINE GLUCONATE 0.12% ORAL RINSE 15 ML: 1.2 LIQUID ORAL at 21:41

## 2020-10-29 RX ADMIN — MUPIROCIN 1 APPLICATION: 20 OINTMENT TOPICAL at 14:44

## 2020-10-29 RX ADMIN — INSULIN LISPRO 4 UNITS: 100 INJECTION, SOLUTION INTRAVENOUS; SUBCUTANEOUS at 17:24

## 2020-10-29 RX ADMIN — STANDARDIZED SENNA CONCENTRATE 8.6 MG: 8.6 TABLET ORAL at 09:37

## 2020-10-29 RX ADMIN — CHLORDIAZEPOXIDE HYDROCHLORIDE 25 MG: 25 CAPSULE ORAL at 21:41

## 2020-10-29 RX ADMIN — INSULIN GLARGINE 10 UNITS: 100 INJECTION, SOLUTION SUBCUTANEOUS at 21:41

## 2020-10-29 RX ADMIN — CHLORHEXIDINE GLUCONATE 0.12% ORAL RINSE 15 ML: 1.2 LIQUID ORAL at 14:43

## 2020-10-29 RX ADMIN — MUPIROCIN 1 APPLICATION: 20 OINTMENT TOPICAL at 21:40

## 2020-10-29 RX ADMIN — INSULIN LISPRO 2 UNITS: 100 INJECTION, SOLUTION INTRAVENOUS; SUBCUTANEOUS at 09:37

## 2020-10-29 RX ADMIN — INSULIN LISPRO 2 UNITS: 100 INJECTION, SOLUTION INTRAVENOUS; SUBCUTANEOUS at 11:58

## 2020-10-29 RX ADMIN — INSULIN LISPRO 1 UNITS: 100 INJECTION, SOLUTION INTRAVENOUS; SUBCUTANEOUS at 21:59

## 2020-10-29 RX ADMIN — HEPARIN SODIUM 17.2 UNITS/KG/HR: 10000 INJECTION, SOLUTION INTRAVENOUS at 06:06

## 2020-10-29 RX ADMIN — ATORVASTATIN CALCIUM 20 MG: 20 TABLET, FILM COATED ORAL at 21:41

## 2020-10-29 RX ADMIN — ASPIRIN 81 MG CHEWABLE TABLET 81 MG: 81 TABLET CHEWABLE at 09:37

## 2020-10-29 RX ADMIN — DILTIAZEM HYDROCHLORIDE 30 MG: 30 TABLET, FILM COATED ORAL at 17:22

## 2020-10-29 RX ADMIN — DOCUSATE SODIUM 100 MG: 100 CAPSULE ORAL at 09:37

## 2020-10-29 RX ADMIN — METOPROLOL SUCCINATE 100 MG: 100 TABLET, EXTENDED RELEASE ORAL at 09:37

## 2020-10-29 RX ADMIN — DOCUSATE SODIUM 100 MG: 100 CAPSULE ORAL at 17:22

## 2020-10-29 RX ADMIN — CHLORDIAZEPOXIDE HYDROCHLORIDE 25 MG: 25 CAPSULE ORAL at 11:58

## 2020-10-29 RX ADMIN — DULOXETINE HYDROCHLORIDE 60 MG: 60 CAPSULE, DELAYED RELEASE ORAL at 09:38

## 2020-10-30 ENCOUNTER — APPOINTMENT (INPATIENT)
Dept: NON INVASIVE DIAGNOSTICS | Facility: HOSPITAL | Age: 60
DRG: 247 | End: 2020-10-30
Payer: COMMERCIAL

## 2020-10-30 LAB
ANION GAP SERPL CALCULATED.3IONS-SCNC: 8 MMOL/L (ref 4–13)
APTT PPP: 81 SECONDS (ref 23–37)
BUN SERPL-MCNC: 12 MG/DL (ref 5–25)
CALCIUM SERPL-MCNC: 9.9 MG/DL (ref 8.3–10.1)
CHLORIDE SERPL-SCNC: 100 MMOL/L (ref 100–108)
CO2 SERPL-SCNC: 28 MMOL/L (ref 21–32)
CREAT SERPL-MCNC: 1.14 MG/DL (ref 0.6–1.3)
GFR SERPL CREATININE-BSD FRML MDRD: 69 ML/MIN/1.73SQ M
GLUCOSE SERPL-MCNC: 249 MG/DL (ref 65–140)
GLUCOSE SERPL-MCNC: 255 MG/DL (ref 65–140)
GLUCOSE SERPL-MCNC: 314 MG/DL (ref 65–140)
GLUCOSE SERPL-MCNC: 318 MG/DL (ref 65–140)
GLUCOSE SERPL-MCNC: 394 MG/DL (ref 65–140)
MRSA NOSE QL CULT: NORMAL
POTASSIUM SERPL-SCNC: 3.9 MMOL/L (ref 3.5–5.3)
SODIUM SERPL-SCNC: 136 MMOL/L (ref 136–145)

## 2020-10-30 PROCEDURE — C1894 INTRO/SHEATH, NON-LASER: HCPCS | Performed by: NURSE PRACTITIONER

## 2020-10-30 PROCEDURE — C1753 CATH, INTRAVAS ULTRASOUND: HCPCS | Performed by: NURSE PRACTITIONER

## 2020-10-30 PROCEDURE — C9600 PERC DRUG-EL COR STENT SING: HCPCS | Performed by: NURSE PRACTITIONER

## 2020-10-30 PROCEDURE — 93454 CORONARY ARTERY ANGIO S&I: CPT | Performed by: NURSE PRACTITIONER

## 2020-10-30 PROCEDURE — C1769 GUIDE WIRE: HCPCS | Performed by: NURSE PRACTITIONER

## 2020-10-30 PROCEDURE — C1874 STENT, COATED/COV W/DEL SYS: HCPCS

## 2020-10-30 PROCEDURE — B2101ZZ FLUOROSCOPY OF SINGLE CORONARY ARTERY USING LOW OSMOLAR CONTRAST: ICD-10-PCS | Performed by: INTERNAL MEDICINE

## 2020-10-30 PROCEDURE — C1725 CATH, TRANSLUMIN NON-LASER: HCPCS | Performed by: NURSE PRACTITIONER

## 2020-10-30 PROCEDURE — 93005 ELECTROCARDIOGRAM TRACING: CPT

## 2020-10-30 PROCEDURE — 92928 PRQ TCAT PLMT NTRAC ST 1 LES: CPT | Performed by: INTERNAL MEDICINE

## 2020-10-30 PROCEDURE — 92978 ENDOLUMINL IVUS OCT C 1ST: CPT | Performed by: INTERNAL MEDICINE

## 2020-10-30 PROCEDURE — 99152 MOD SED SAME PHYS/QHP 5/>YRS: CPT | Performed by: INTERNAL MEDICINE

## 2020-10-30 PROCEDURE — 85730 THROMBOPLASTIN TIME PARTIAL: CPT | Performed by: INTERNAL MEDICINE

## 2020-10-30 PROCEDURE — 85347 COAGULATION TIME ACTIVATED: CPT

## 2020-10-30 PROCEDURE — 99152 MOD SED SAME PHYS/QHP 5/>YRS: CPT | Performed by: NURSE PRACTITIONER

## 2020-10-30 PROCEDURE — 92978 ENDOLUMINL IVUS OCT C 1ST: CPT | Performed by: NURSE PRACTITIONER

## 2020-10-30 PROCEDURE — 80048 BASIC METABOLIC PNL TOTAL CA: CPT | Performed by: NURSE PRACTITIONER

## 2020-10-30 PROCEDURE — B240ZZ3 ULTRASONOGRAPHY OF SINGLE CORONARY ARTERY, INTRAVASCULAR: ICD-10-PCS | Performed by: INTERNAL MEDICINE

## 2020-10-30 PROCEDURE — 99232 SBSQ HOSP IP/OBS MODERATE 35: CPT | Performed by: PHYSICIAN ASSISTANT

## 2020-10-30 PROCEDURE — 027036Z DILATION OF CORONARY ARTERY, ONE ARTERY WITH THREE DRUG-ELUTING INTRALUMINAL DEVICES, PERCUTANEOUS APPROACH: ICD-10-PCS | Performed by: INTERNAL MEDICINE

## 2020-10-30 PROCEDURE — 99232 SBSQ HOSP IP/OBS MODERATE 35: CPT | Performed by: INTERNAL MEDICINE

## 2020-10-30 PROCEDURE — 99153 MOD SED SAME PHYS/QHP EA: CPT | Performed by: NURSE PRACTITIONER

## 2020-10-30 PROCEDURE — C1887 CATHETER, GUIDING: HCPCS | Performed by: NURSE PRACTITIONER

## 2020-10-30 PROCEDURE — 82948 REAGENT STRIP/BLOOD GLUCOSE: CPT

## 2020-10-30 RX ORDER — HEPARIN SODIUM 1000 [USP'U]/ML
INJECTION, SOLUTION INTRAVENOUS; SUBCUTANEOUS CODE/TRAUMA/SEDATION MEDICATION
Status: COMPLETED | OUTPATIENT
Start: 2020-10-30 | End: 2020-10-30

## 2020-10-30 RX ORDER — NITROGLYCERIN 20 MG/100ML
INJECTION INTRAVENOUS CODE/TRAUMA/SEDATION MEDICATION
Status: COMPLETED | OUTPATIENT
Start: 2020-10-30 | End: 2020-10-30

## 2020-10-30 RX ORDER — FUROSEMIDE 10 MG/ML
40 INJECTION INTRAMUSCULAR; INTRAVENOUS EVERY 8 HOURS
Status: DISCONTINUED | OUTPATIENT
Start: 2020-10-30 | End: 2020-10-31

## 2020-10-30 RX ORDER — ATORVASTATIN CALCIUM 80 MG/1
80 TABLET, FILM COATED ORAL
Status: DISCONTINUED | OUTPATIENT
Start: 2020-10-30 | End: 2020-10-31 | Stop reason: HOSPADM

## 2020-10-30 RX ORDER — VERAPAMIL HCL 2.5 MG/ML
AMPUL (ML) INTRAVENOUS CODE/TRAUMA/SEDATION MEDICATION
Status: COMPLETED | OUTPATIENT
Start: 2020-10-30 | End: 2020-10-30

## 2020-10-30 RX ORDER — INSULIN GLARGINE 100 [IU]/ML
20 INJECTION, SOLUTION SUBCUTANEOUS
Status: DISCONTINUED | OUTPATIENT
Start: 2020-10-30 | End: 2020-10-31 | Stop reason: HOSPADM

## 2020-10-30 RX ORDER — METHYLPREDNISOLONE 16 MG/1
32 TABLET ORAL
Status: COMPLETED | OUTPATIENT
Start: 2020-10-30 | End: 2020-10-30

## 2020-10-30 RX ORDER — DIPHENHYDRAMINE HCL 25 MG
50 TABLET ORAL
Status: DISCONTINUED | OUTPATIENT
Start: 2020-10-30 | End: 2020-10-31 | Stop reason: HOSPADM

## 2020-10-30 RX ORDER — LIDOCAINE HYDROCHLORIDE 10 MG/ML
INJECTION, SOLUTION EPIDURAL; INFILTRATION; INTRACAUDAL; PERINEURAL CODE/TRAUMA/SEDATION MEDICATION
Status: COMPLETED | OUTPATIENT
Start: 2020-10-30 | End: 2020-10-30

## 2020-10-30 RX ORDER — ACETAMINOPHEN 325 MG/1
650 TABLET ORAL EVERY 4 HOURS PRN
Status: DISCONTINUED | OUTPATIENT
Start: 2020-10-30 | End: 2020-10-31 | Stop reason: HOSPADM

## 2020-10-30 RX ORDER — FENTANYL CITRATE 50 UG/ML
INJECTION, SOLUTION INTRAMUSCULAR; INTRAVENOUS CODE/TRAUMA/SEDATION MEDICATION
Status: COMPLETED | OUTPATIENT
Start: 2020-10-30 | End: 2020-10-30

## 2020-10-30 RX ORDER — SODIUM CHLORIDE 9 MG/ML
100 INJECTION, SOLUTION INTRAVENOUS CONTINUOUS
Status: DISCONTINUED | OUTPATIENT
Start: 2020-10-30 | End: 2020-10-31

## 2020-10-30 RX ORDER — MIDAZOLAM HYDROCHLORIDE 2 MG/2ML
INJECTION, SOLUTION INTRAMUSCULAR; INTRAVENOUS CODE/TRAUMA/SEDATION MEDICATION
Status: COMPLETED | OUTPATIENT
Start: 2020-10-30 | End: 2020-10-30

## 2020-10-30 RX ADMIN — Medication 400 MCG: at 15:59

## 2020-10-30 RX ADMIN — HEPARIN SODIUM 5000 UNITS: 1000 INJECTION INTRAVENOUS; SUBCUTANEOUS at 16:05

## 2020-10-30 RX ADMIN — LISINOPRIL 20 MG: 20 TABLET ORAL at 09:20

## 2020-10-30 RX ADMIN — TICAGRELOR 90 MG: 90 TABLET ORAL at 21:42

## 2020-10-30 RX ADMIN — APIXABAN 5 MG: 5 TABLET, FILM COATED ORAL at 21:51

## 2020-10-30 RX ADMIN — DOCUSATE SODIUM 100 MG: 100 CAPSULE ORAL at 09:26

## 2020-10-30 RX ADMIN — HEPARIN SODIUM 17.2 UNITS/KG/HR: 10000 INJECTION, SOLUTION INTRAVENOUS at 13:49

## 2020-10-30 RX ADMIN — DILTIAZEM HYDROCHLORIDE 30 MG: 30 TABLET, FILM COATED ORAL at 11:35

## 2020-10-30 RX ADMIN — METHYLPREDNISOLONE 32 MG: 16 TABLET ORAL at 14:32

## 2020-10-30 RX ADMIN — INSULIN LISPRO 4 UNITS: 100 INJECTION, SOLUTION INTRAVENOUS; SUBCUTANEOUS at 18:04

## 2020-10-30 RX ADMIN — MIDAZOLAM 1 MG: 1 INJECTION INTRAMUSCULAR; INTRAVENOUS at 15:12

## 2020-10-30 RX ADMIN — MUPIROCIN 1 APPLICATION: 20 OINTMENT TOPICAL at 09:28

## 2020-10-30 RX ADMIN — DILTIAZEM HYDROCHLORIDE 30 MG: 30 TABLET, FILM COATED ORAL at 00:47

## 2020-10-30 RX ADMIN — CHLORHEXIDINE GLUCONATE 0.12% ORAL RINSE 15 ML: 1.2 LIQUID ORAL at 21:41

## 2020-10-30 RX ADMIN — MIDAZOLAM 2 MG: 1 INJECTION INTRAMUSCULAR; INTRAVENOUS at 14:54

## 2020-10-30 RX ADMIN — ATORVASTATIN CALCIUM 80 MG: 80 TABLET, FILM COATED ORAL at 21:42

## 2020-10-30 RX ADMIN — INSULIN GLARGINE 20 UNITS: 100 INJECTION, SOLUTION SUBCUTANEOUS at 21:39

## 2020-10-30 RX ADMIN — METOPROLOL SUCCINATE 100 MG: 100 TABLET, EXTENDED RELEASE ORAL at 09:26

## 2020-10-30 RX ADMIN — DULOXETINE HYDROCHLORIDE 60 MG: 60 CAPSULE, DELAYED RELEASE ORAL at 09:28

## 2020-10-30 RX ADMIN — Medication 200 MCG: at 15:04

## 2020-10-30 RX ADMIN — METOPROLOL SUCCINATE 100 MG: 100 TABLET, EXTENDED RELEASE ORAL at 21:42

## 2020-10-30 RX ADMIN — LIDOCAINE HYDROCHLORIDE 0.1 ML: 10 INJECTION, SOLUTION EPIDURAL; INFILTRATION; INTRACAUDAL; PERINEURAL at 14:58

## 2020-10-30 RX ADMIN — FENTANYL CITRATE 50 MCG: 50 INJECTION, SOLUTION INTRAMUSCULAR; INTRAVENOUS at 14:54

## 2020-10-30 RX ADMIN — DILTIAZEM HYDROCHLORIDE 30 MG: 30 TABLET, FILM COATED ORAL at 06:17

## 2020-10-30 RX ADMIN — INSULIN LISPRO 4 UNITS: 100 INJECTION, SOLUTION INTRAVENOUS; SUBCUTANEOUS at 21:40

## 2020-10-30 RX ADMIN — INSULIN LISPRO 3 UNITS: 100 INJECTION, SOLUTION INTRAVENOUS; SUBCUTANEOUS at 11:34

## 2020-10-30 RX ADMIN — DIPHENHYDRAMINE HCL 50 MG: 25 TABLET ORAL at 14:22

## 2020-10-30 RX ADMIN — DOCUSATE SODIUM 100 MG: 100 CAPSULE ORAL at 18:03

## 2020-10-30 RX ADMIN — ASPIRIN 81 MG CHEWABLE TABLET 81 MG: 81 TABLET CHEWABLE at 09:26

## 2020-10-30 RX ADMIN — FUROSEMIDE 40 MG: 10 INJECTION, SOLUTION INTRAMUSCULAR; INTRAVENOUS at 18:04

## 2020-10-30 RX ADMIN — VERAPAMIL HYDROCHLORIDE 1.25 MG: 2.5 INJECTION INTRAVENOUS at 15:04

## 2020-10-30 RX ADMIN — FENTANYL CITRATE 25 MCG: 50 INJECTION, SOLUTION INTRAMUSCULAR; INTRAVENOUS at 16:07

## 2020-10-30 RX ADMIN — METHYLPREDNISOLONE 32 MG: 16 TABLET ORAL at 07:23

## 2020-10-30 RX ADMIN — HEPARIN SODIUM 11000 UNITS: 1000 INJECTION INTRAVENOUS; SUBCUTANEOUS at 15:03

## 2020-10-30 RX ADMIN — MIDAZOLAM 1 MG: 1 INJECTION INTRAMUSCULAR; INTRAVENOUS at 16:07

## 2020-10-30 RX ADMIN — CHLORDIAZEPOXIDE HYDROCHLORIDE 25 MG: 25 CAPSULE ORAL at 13:51

## 2020-10-30 RX ADMIN — CHLORDIAZEPOXIDE HYDROCHLORIDE 25 MG: 25 CAPSULE ORAL at 06:17

## 2020-10-30 RX ADMIN — FENTANYL CITRATE 25 MCG: 50 INJECTION, SOLUTION INTRAMUSCULAR; INTRAVENOUS at 15:12

## 2020-10-30 RX ADMIN — INSULIN LISPRO 3 UNITS: 100 INJECTION, SOLUTION INTRAVENOUS; SUBCUTANEOUS at 18:04

## 2020-10-30 RX ADMIN — DILTIAZEM HYDROCHLORIDE 30 MG: 30 TABLET, FILM COATED ORAL at 18:03

## 2020-10-30 RX ADMIN — MUPIROCIN 1 APPLICATION: 20 OINTMENT TOPICAL at 21:42

## 2020-10-30 RX ADMIN — Medication 400 MCG: at 15:29

## 2020-10-30 RX ADMIN — IOHEXOL 150 ML: 350 INJECTION, SOLUTION INTRAVENOUS at 16:27

## 2020-10-30 RX ADMIN — CHLORHEXIDINE GLUCONATE 0.12% ORAL RINSE 15 ML: 1.2 LIQUID ORAL at 09:20

## 2020-10-30 RX ADMIN — STANDARDIZED SENNA CONCENTRATE 8.6 MG: 8.6 TABLET ORAL at 09:26

## 2020-10-30 RX ADMIN — INSULIN LISPRO 2 UNITS: 100 INJECTION, SOLUTION INTRAVENOUS; SUBCUTANEOUS at 06:41

## 2020-10-31 VITALS
HEART RATE: 76 BPM | TEMPERATURE: 97.9 F | OXYGEN SATURATION: 96 % | BODY MASS INDEX: 38.01 KG/M2 | RESPIRATION RATE: 18 BRPM | WEIGHT: 250 LBS | DIASTOLIC BLOOD PRESSURE: 74 MMHG | SYSTOLIC BLOOD PRESSURE: 140 MMHG

## 2020-10-31 LAB
ANION GAP SERPL CALCULATED.3IONS-SCNC: 9 MMOL/L (ref 4–13)
APTT PPP: 27 SECONDS (ref 23–37)
ATRIAL RATE: 277 BPM
BUN SERPL-MCNC: 13 MG/DL (ref 5–25)
CALCIUM SERPL-MCNC: 9.5 MG/DL (ref 8.3–10.1)
CHLORIDE SERPL-SCNC: 102 MMOL/L (ref 100–108)
CO2 SERPL-SCNC: 27 MMOL/L (ref 21–32)
CREAT SERPL-MCNC: 1.04 MG/DL (ref 0.6–1.3)
ERYTHROCYTE [DISTWIDTH] IN BLOOD BY AUTOMATED COUNT: 12.8 % (ref 11.6–15.1)
GFR SERPL CREATININE-BSD FRML MDRD: 77 ML/MIN/1.73SQ M
GLUCOSE SERPL-MCNC: 320 MG/DL (ref 65–140)
GLUCOSE SERPL-MCNC: 323 MG/DL (ref 65–140)
GLUCOSE SERPL-MCNC: 326 MG/DL (ref 65–140)
HCT VFR BLD AUTO: 46.1 % (ref 36.5–49.3)
HGB BLD-MCNC: 15.8 G/DL (ref 12–17)
MCH RBC QN AUTO: 33.8 PG (ref 26.8–34.3)
MCHC RBC AUTO-ENTMCNC: 34.3 G/DL (ref 31.4–37.4)
MCV RBC AUTO: 99 FL (ref 82–98)
PLATELET # BLD AUTO: 271 THOUSANDS/UL (ref 149–390)
PMV BLD AUTO: 11 FL (ref 8.9–12.7)
POTASSIUM SERPL-SCNC: 3.5 MMOL/L (ref 3.5–5.3)
QRS AXIS: 12 DEGREES
QRSD INTERVAL: 76 MS
QT INTERVAL: 346 MS
QTC INTERVAL: 450 MS
RBC # BLD AUTO: 4.68 MILLION/UL (ref 3.88–5.62)
SODIUM SERPL-SCNC: 138 MMOL/L (ref 136–145)
T WAVE AXIS: -47 DEGREES
VENTRICULAR RATE: 102 BPM
WBC # BLD AUTO: 15.3 THOUSAND/UL (ref 4.31–10.16)

## 2020-10-31 PROCEDURE — 80048 BASIC METABOLIC PNL TOTAL CA: CPT | Performed by: INTERNAL MEDICINE

## 2020-10-31 PROCEDURE — 82948 REAGENT STRIP/BLOOD GLUCOSE: CPT

## 2020-10-31 PROCEDURE — 99239 HOSP IP/OBS DSCHRG MGMT >30: CPT | Performed by: PHYSICIAN ASSISTANT

## 2020-10-31 PROCEDURE — 99232 SBSQ HOSP IP/OBS MODERATE 35: CPT | Performed by: INTERNAL MEDICINE

## 2020-10-31 PROCEDURE — 85730 THROMBOPLASTIN TIME PARTIAL: CPT | Performed by: INTERNAL MEDICINE

## 2020-10-31 PROCEDURE — 85027 COMPLETE CBC AUTOMATED: CPT | Performed by: INTERNAL MEDICINE

## 2020-10-31 PROCEDURE — 93010 ELECTROCARDIOGRAM REPORT: CPT | Performed by: INTERNAL MEDICINE

## 2020-10-31 RX ORDER — DILTIAZEM HYDROCHLORIDE 180 MG/1
180 CAPSULE, COATED, EXTENDED RELEASE ORAL DAILY
Status: DISCONTINUED | OUTPATIENT
Start: 2020-10-31 | End: 2020-10-31 | Stop reason: HOSPADM

## 2020-10-31 RX ORDER — FUROSEMIDE 40 MG/1
40 TABLET ORAL DAILY
Qty: 30 TABLET | Refills: 0 | Status: SHIPPED | OUTPATIENT
Start: 2020-11-01 | End: 2021-01-28 | Stop reason: SDUPTHER

## 2020-10-31 RX ORDER — DILTIAZEM HYDROCHLORIDE 180 MG/1
180 CAPSULE, COATED, EXTENDED RELEASE ORAL DAILY
Qty: 30 CAPSULE | Refills: 0 | Status: SHIPPED | OUTPATIENT
Start: 2020-11-01 | End: 2021-01-28

## 2020-10-31 RX ORDER — ATORVASTATIN CALCIUM 80 MG/1
80 TABLET, FILM COATED ORAL
Qty: 30 TABLET | Refills: 0 | Status: SHIPPED | OUTPATIENT
Start: 2020-10-31

## 2020-10-31 RX ORDER — METOPROLOL SUCCINATE 100 MG/1
100 TABLET, EXTENDED RELEASE ORAL 2 TIMES DAILY
Qty: 60 TABLET | Refills: 0 | Status: SHIPPED | OUTPATIENT
Start: 2020-10-31 | End: 2021-01-28 | Stop reason: SDUPTHER

## 2020-10-31 RX ORDER — FUROSEMIDE 40 MG/1
40 TABLET ORAL DAILY
Status: DISCONTINUED | OUTPATIENT
Start: 2020-11-01 | End: 2020-10-31 | Stop reason: HOSPADM

## 2020-10-31 RX ADMIN — APIXABAN 5 MG: 5 TABLET, FILM COATED ORAL at 08:49

## 2020-10-31 RX ADMIN — FUROSEMIDE 40 MG: 10 INJECTION, SOLUTION INTRAMUSCULAR; INTRAVENOUS at 02:33

## 2020-10-31 RX ADMIN — LISINOPRIL 20 MG: 20 TABLET ORAL at 08:49

## 2020-10-31 RX ADMIN — DILTIAZEM HYDROCHLORIDE 30 MG: 30 TABLET, FILM COATED ORAL at 00:27

## 2020-10-31 RX ADMIN — FUROSEMIDE 40 MG: 10 INJECTION, SOLUTION INTRAMUSCULAR; INTRAVENOUS at 08:50

## 2020-10-31 RX ADMIN — DULOXETINE HYDROCHLORIDE 60 MG: 60 CAPSULE, DELAYED RELEASE ORAL at 08:51

## 2020-10-31 RX ADMIN — DILTIAZEM HYDROCHLORIDE 180 MG: 180 CAPSULE, COATED, EXTENDED RELEASE ORAL at 11:20

## 2020-10-31 RX ADMIN — ASPIRIN 81 MG CHEWABLE TABLET 81 MG: 81 TABLET CHEWABLE at 08:49

## 2020-10-31 RX ADMIN — MUPIROCIN 1 APPLICATION: 20 OINTMENT TOPICAL at 08:51

## 2020-10-31 RX ADMIN — TICAGRELOR 90 MG: 90 TABLET ORAL at 08:51

## 2020-10-31 RX ADMIN — INSULIN LISPRO 3 UNITS: 100 INJECTION, SOLUTION INTRAVENOUS; SUBCUTANEOUS at 06:23

## 2020-10-31 RX ADMIN — DILTIAZEM HYDROCHLORIDE 30 MG: 30 TABLET, FILM COATED ORAL at 06:20

## 2020-10-31 RX ADMIN — METOPROLOL SUCCINATE 100 MG: 100 TABLET, EXTENDED RELEASE ORAL at 08:49

## 2020-10-31 RX ADMIN — INSULIN LISPRO 3 UNITS: 100 INJECTION, SOLUTION INTRAVENOUS; SUBCUTANEOUS at 11:20

## 2020-10-31 RX ADMIN — CHLORHEXIDINE GLUCONATE 0.12% ORAL RINSE 15 ML: 1.2 LIQUID ORAL at 08:49

## 2020-10-31 RX ADMIN — INSULIN LISPRO 4 UNITS: 100 INJECTION, SOLUTION INTRAVENOUS; SUBCUTANEOUS at 11:20

## 2020-10-31 RX ADMIN — INSULIN LISPRO 4 UNITS: 100 INJECTION, SOLUTION INTRAVENOUS; SUBCUTANEOUS at 08:50

## 2020-10-31 RX ADMIN — STANDARDIZED SENNA CONCENTRATE 8.6 MG: 8.6 TABLET ORAL at 08:49

## 2020-11-01 LAB
KCT BLD-ACNC: 243 SEC (ref 89–137)
KCT BLD-ACNC: 318 SEC (ref 89–137)
SPECIMEN SOURCE: ABNORMAL
SPECIMEN SOURCE: ABNORMAL

## 2020-11-05 ENCOUNTER — TELEPHONE (OUTPATIENT)
Dept: CARDIOLOGY CLINIC | Facility: CLINIC | Age: 60
End: 2020-11-05

## 2020-11-06 ENCOUNTER — HOSPITAL ENCOUNTER (OUTPATIENT)
Dept: NON INVASIVE DIAGNOSTICS | Facility: CLINIC | Age: 60
Discharge: HOME | End: 2020-11-06
Payer: COMMERCIAL

## 2020-11-06 DIAGNOSIS — I73.9 PAD (PERIPHERAL ARTERY DISEASE) (HCC): ICD-10-CM

## 2020-11-06 PROCEDURE — 93922 UPR/L XTREMITY ART 2 LEVELS: CPT | Performed by: SURGERY

## 2020-11-06 PROCEDURE — 93925 LOWER EXTREMITY STUDY: CPT | Performed by: SURGERY

## 2020-11-06 PROCEDURE — 93923 UPR/LXTR ART STDY 3+ LVLS: CPT

## 2020-11-06 PROCEDURE — 93925 LOWER EXTREMITY STUDY: CPT

## 2020-11-10 ENCOUNTER — CONSULT (OUTPATIENT)
Dept: VASCULAR SURGERY | Facility: CLINIC | Age: 60
End: 2020-11-10
Payer: COMMERCIAL

## 2020-11-10 ENCOUNTER — TELEPHONE (OUTPATIENT)
Dept: VASCULAR SURGERY | Facility: CLINIC | Age: 60
End: 2020-11-10

## 2020-11-10 VITALS
SYSTOLIC BLOOD PRESSURE: 120 MMHG | HEIGHT: 68 IN | WEIGHT: 243 LBS | HEART RATE: 60 BPM | TEMPERATURE: 96.5 F | DIASTOLIC BLOOD PRESSURE: 70 MMHG | BODY MASS INDEX: 36.83 KG/M2

## 2020-11-10 DIAGNOSIS — I73.9 PAD (PERIPHERAL ARTERY DISEASE) (HCC): Primary | ICD-10-CM

## 2020-11-10 DIAGNOSIS — I48.19 PERSISTENT ATRIAL FIBRILLATION (HCC): ICD-10-CM

## 2020-11-10 DIAGNOSIS — I65.22 ASYMPTOMATIC STENOSIS OF LEFT CAROTID ARTERY: ICD-10-CM

## 2020-11-10 DIAGNOSIS — E11.9 TYPE 2 DIABETES MELLITUS WITHOUT COMPLICATION, WITHOUT LONG-TERM CURRENT USE OF INSULIN (HCC): ICD-10-CM

## 2020-11-10 DIAGNOSIS — I25.10 CORONARY ARTERY DISEASE INVOLVING NATIVE CORONARY ARTERY OF NATIVE HEART WITHOUT ANGINA PECTORIS: ICD-10-CM

## 2020-11-10 DIAGNOSIS — M48.062 LUMBAR STENOSIS WITH NEUROGENIC CLAUDICATION: ICD-10-CM

## 2020-11-10 PROCEDURE — 99245 OFF/OP CONSLTJ NEW/EST HI 55: CPT | Performed by: SURGERY

## 2020-11-10 RX ORDER — SODIUM CHLORIDE 9 MG/ML
125 INJECTION, SOLUTION INTRAVENOUS CONTINUOUS
Status: CANCELLED | OUTPATIENT
Start: 2020-11-10

## 2020-11-10 RX ORDER — METHYLPREDNISOLONE 16 MG/1
32 TABLET ORAL
Qty: 4 TABLET | Refills: 0 | Status: SHIPPED | OUTPATIENT
Start: 2020-11-10 | End: 2020-11-11

## 2020-11-10 RX ORDER — DIPHENHYDRAMINE HCL 25 MG
50 TABLET ORAL
Qty: 2 TABLET | Refills: 0 | Status: SHIPPED | OUTPATIENT
Start: 2020-11-10 | End: 2020-12-18 | Stop reason: ALTCHOICE

## 2020-11-13 ENCOUNTER — TELEPHONE (OUTPATIENT)
Dept: VASCULAR SURGERY | Facility: CLINIC | Age: 60
End: 2020-11-13

## 2020-11-13 DIAGNOSIS — Z11.59 SCREENING FOR VIRAL DISEASE: Primary | ICD-10-CM

## 2020-11-24 ENCOUNTER — TELEPHONE (OUTPATIENT)
Dept: RADIOLOGY | Facility: HOSPITAL | Age: 60
End: 2020-11-24

## 2020-11-30 ENCOUNTER — TELEPHONE (OUTPATIENT)
Dept: INPATIENT UNIT | Facility: HOSPITAL | Age: 60
End: 2020-11-30

## 2020-12-01 ENCOUNTER — HOSPITAL ENCOUNTER (OUTPATIENT)
Dept: RADIOLOGY | Facility: HOSPITAL | Age: 60
Discharge: HOME | End: 2020-12-01
Attending: SURGERY | Admitting: SURGERY
Payer: COMMERCIAL

## 2020-12-01 VITALS
TEMPERATURE: 98 F | OXYGEN SATURATION: 96 % | WEIGHT: 230 LBS | SYSTOLIC BLOOD PRESSURE: 104 MMHG | BODY MASS INDEX: 34.86 KG/M2 | DIASTOLIC BLOOD PRESSURE: 76 MMHG | HEART RATE: 83 BPM | HEIGHT: 68 IN | RESPIRATION RATE: 18 BRPM

## 2020-12-01 DIAGNOSIS — I73.9 PAD (PERIPHERAL ARTERY DISEASE) (HCC): ICD-10-CM

## 2020-12-01 LAB — GLUCOSE SERPL-MCNC: 290 MG/DL (ref 65–140)

## 2020-12-01 PROCEDURE — C1769 GUIDE WIRE: HCPCS

## 2020-12-01 PROCEDURE — C1760 CLOSURE DEV, VASC: HCPCS

## 2020-12-01 PROCEDURE — 75710 ARTERY X-RAYS ARM/LEG: CPT | Performed by: SURGERY

## 2020-12-01 PROCEDURE — 75625 CONTRAST EXAM ABDOMINL AORTA: CPT

## 2020-12-01 PROCEDURE — 99152 MOD SED SAME PHYS/QHP 5/>YRS: CPT

## 2020-12-01 PROCEDURE — C1894 INTRO/SHEATH, NON-LASER: HCPCS

## 2020-12-01 PROCEDURE — C1725 CATH, TRANSLUMIN NON-LASER: HCPCS

## 2020-12-01 PROCEDURE — C1887 CATHETER, GUIDING: HCPCS

## 2020-12-01 PROCEDURE — 75822 VEIN X-RAY ARMS/LEGS: CPT

## 2020-12-01 PROCEDURE — C1876 STENT, NON-COA/NON-COV W/DEL: HCPCS

## 2020-12-01 PROCEDURE — 82948 REAGENT STRIP/BLOOD GLUCOSE: CPT

## 2020-12-01 PROCEDURE — 37226 HB FEM/POPL REVASC W/STENT: CPT

## 2020-12-01 PROCEDURE — 99152 MOD SED SAME PHYS/QHP 5/>YRS: CPT | Performed by: SURGERY

## 2020-12-01 PROCEDURE — 75625 CONTRAST EXAM ABDOMINL AORTA: CPT | Performed by: SURGERY

## 2020-12-01 PROCEDURE — 99153 MOD SED SAME PHYS/QHP EA: CPT

## 2020-12-01 PROCEDURE — 37226 PR REVASCULARIZE FEM/POP ARTERY,ANGIOPLASTY/STENT: CPT | Performed by: SURGERY

## 2020-12-01 PROCEDURE — 76937 US GUIDE VASCULAR ACCESS: CPT | Performed by: SURGERY

## 2020-12-01 RX ORDER — SODIUM CHLORIDE 9 MG/ML
125 INJECTION, SOLUTION INTRAVENOUS CONTINUOUS
Status: DISCONTINUED | OUTPATIENT
Start: 2020-12-01 | End: 2020-12-01 | Stop reason: HOSPADM

## 2020-12-01 RX ORDER — HEPARIN SODIUM 1000 [USP'U]/ML
INJECTION, SOLUTION INTRAVENOUS; SUBCUTANEOUS CODE/TRAUMA/SEDATION MEDICATION
Status: DISCONTINUED | OUTPATIENT
Start: 2020-12-01 | End: 2020-12-01 | Stop reason: HOSPADM

## 2020-12-01 RX ORDER — OXYCODONE HYDROCHLORIDE AND ACETAMINOPHEN 5; 325 MG/1; MG/1
1 TABLET ORAL EVERY 4 HOURS PRN
Status: DISCONTINUED | OUTPATIENT
Start: 2020-12-01 | End: 2020-12-01 | Stop reason: HOSPADM

## 2020-12-01 RX ORDER — MIDAZOLAM HYDROCHLORIDE 2 MG/2ML
INJECTION, SOLUTION INTRAMUSCULAR; INTRAVENOUS CODE/TRAUMA/SEDATION MEDICATION
Status: DISCONTINUED | OUTPATIENT
Start: 2020-12-01 | End: 2020-12-01 | Stop reason: HOSPADM

## 2020-12-01 RX ORDER — FENTANYL CITRATE 50 UG/ML
INJECTION, SOLUTION INTRAMUSCULAR; INTRAVENOUS CODE/TRAUMA/SEDATION MEDICATION
Status: DISCONTINUED | OUTPATIENT
Start: 2020-12-01 | End: 2020-12-01 | Stop reason: HOSPADM

## 2020-12-01 RX ADMIN — FENTANYL CITRATE 25 MCG: 50 INJECTION INTRAMUSCULAR; INTRAVENOUS at 09:09

## 2020-12-01 RX ADMIN — FENTANYL CITRATE 25 MCG: 50 INJECTION INTRAMUSCULAR; INTRAVENOUS at 09:28

## 2020-12-01 RX ADMIN — SODIUM CHLORIDE 125 ML/HR: 0.9 INJECTION, SOLUTION INTRAVENOUS at 08:18

## 2020-12-01 RX ADMIN — MIDAZOLAM 0.5 MG: 1 INJECTION INTRAMUSCULAR; INTRAVENOUS at 09:27

## 2020-12-01 RX ADMIN — MIDAZOLAM 0.5 MG: 1 INJECTION INTRAMUSCULAR; INTRAVENOUS at 09:34

## 2020-12-01 RX ADMIN — IODIXANOL 60 ML: 320 INJECTION, SOLUTION INTRAVASCULAR at 10:12

## 2020-12-01 RX ADMIN — FENTANYL CITRATE 25 MCG: 50 INJECTION INTRAMUSCULAR; INTRAVENOUS at 09:44

## 2020-12-01 RX ADMIN — MIDAZOLAM 0.5 MG: 1 INJECTION INTRAMUSCULAR; INTRAVENOUS at 09:09

## 2020-12-01 RX ADMIN — HEPARIN SODIUM 5000 UNITS: 1000 INJECTION INTRAVENOUS; SUBCUTANEOUS at 09:35

## 2020-12-01 RX ADMIN — FENTANYL CITRATE 25 MCG: 50 INJECTION INTRAMUSCULAR; INTRAVENOUS at 09:19

## 2020-12-01 RX ADMIN — FENTANYL CITRATE 25 MCG: 50 INJECTION INTRAMUSCULAR; INTRAVENOUS at 09:20

## 2020-12-01 RX ADMIN — MIDAZOLAM 0.5 MG: 1 INJECTION INTRAMUSCULAR; INTRAVENOUS at 09:18

## 2020-12-16 ENCOUNTER — TELEPHONE (OUTPATIENT)
Dept: VASCULAR SURGERY | Facility: CLINIC | Age: 60
End: 2020-12-16

## 2020-12-18 ENCOUNTER — OFFICE VISIT (OUTPATIENT)
Dept: VASCULAR SURGERY | Facility: CLINIC | Age: 60
End: 2020-12-18
Payer: COMMERCIAL

## 2020-12-18 VITALS
SYSTOLIC BLOOD PRESSURE: 128 MMHG | BODY MASS INDEX: 35.61 KG/M2 | DIASTOLIC BLOOD PRESSURE: 78 MMHG | WEIGHT: 235 LBS | TEMPERATURE: 98.2 F | HEIGHT: 68 IN | HEART RATE: 67 BPM | RESPIRATION RATE: 16 BRPM

## 2020-12-18 DIAGNOSIS — I73.9 PAD (PERIPHERAL ARTERY DISEASE) (HCC): Primary | ICD-10-CM

## 2020-12-18 DIAGNOSIS — I65.22 ASYMPTOMATIC STENOSIS OF LEFT CAROTID ARTERY: ICD-10-CM

## 2020-12-18 DIAGNOSIS — I48.19 PERSISTENT ATRIAL FIBRILLATION (HCC): ICD-10-CM

## 2020-12-18 DIAGNOSIS — E11.9 TYPE 2 DIABETES MELLITUS WITHOUT COMPLICATION, WITHOUT LONG-TERM CURRENT USE OF INSULIN (HCC): ICD-10-CM

## 2020-12-18 PROCEDURE — 99215 OFFICE O/P EST HI 40 MIN: CPT | Performed by: SURGERY

## 2020-12-18 RX ORDER — FENOFIBRATE 54 MG/1
TABLET ORAL
COMMUNITY
Start: 2020-11-22

## 2021-01-28 ENCOUNTER — OFFICE VISIT (OUTPATIENT)
Dept: CARDIOLOGY CLINIC | Facility: CLINIC | Age: 61
End: 2021-01-28
Payer: COMMERCIAL

## 2021-01-28 VITALS
BODY MASS INDEX: 34.66 KG/M2 | DIASTOLIC BLOOD PRESSURE: 70 MMHG | HEIGHT: 68 IN | HEART RATE: 66 BPM | SYSTOLIC BLOOD PRESSURE: 110 MMHG | WEIGHT: 228.7 LBS

## 2021-01-28 DIAGNOSIS — I73.9 PAD (PERIPHERAL ARTERY DISEASE) (HCC): ICD-10-CM

## 2021-01-28 DIAGNOSIS — Z95.5 STATUS POST INSERTION OF DRUG-ELUTING STENT INTO LEFT ANTERIOR DESCENDING (LAD) ARTERY: ICD-10-CM

## 2021-01-28 DIAGNOSIS — I42.9 CARDIOMYOPATHY, UNSPECIFIED TYPE (HCC): ICD-10-CM

## 2021-01-28 DIAGNOSIS — I25.10 CORONARY ARTERY DISEASE INVOLVING NATIVE CORONARY ARTERY OF NATIVE HEART WITHOUT ANGINA PECTORIS: Primary | ICD-10-CM

## 2021-01-28 DIAGNOSIS — I25.5 ISCHEMIC CARDIOMYOPATHY: ICD-10-CM

## 2021-01-28 DIAGNOSIS — I48.19 PERSISTENT ATRIAL FIBRILLATION (HCC): ICD-10-CM

## 2021-01-28 DIAGNOSIS — I48.11 LONGSTANDING PERSISTENT ATRIAL FIBRILLATION (HCC): ICD-10-CM

## 2021-01-28 PROCEDURE — 99214 OFFICE O/P EST MOD 30 MIN: CPT | Performed by: INTERNAL MEDICINE

## 2021-01-28 RX ORDER — METOPROLOL SUCCINATE 100 MG/1
100 TABLET, EXTENDED RELEASE ORAL DAILY
Qty: 60 TABLET | Refills: 0
Start: 2021-01-28

## 2021-01-28 RX ORDER — SILDENAFIL CITRATE 20 MG/1
20 TABLET ORAL
COMMUNITY
Start: 2020-12-31 | End: 2022-08-04

## 2021-01-28 RX ORDER — FUROSEMIDE 20 MG/1
20 TABLET ORAL DAILY
Qty: 30 TABLET | Refills: 6
Start: 2021-01-28 | End: 2021-10-21 | Stop reason: HOSPADM

## 2021-01-28 NOTE — PROGRESS NOTES
Cardiology Follow Up    Tremaine Rausch  1960  [address] [facility] [place]  [facility]  [address]  [phone]    1  Coronary artery disease involving native coronary artery of native heart without angina pectoris     2  Persistent atrial fibrillation (HCC)  metoprolol succinate (TOPROL-XL) 100 mg 24 hr tablet   3  Ischemic cardiomyopathy  furosemide (LASIX) 20 mg tablet   4  Longstanding persistent atrial fibrillation (HCC)     5  Cardiomyopathy, unspecified type ([address] )     6  PAD (peripheral artery disease) ([facility])     7  Status post insertion of drug-eluting stent into left anterior descending (LAD) artery         Discussion/Summary:    1  CAD - Zenon Hawkins  Was found to have significant left main and LAD disease back in October  After consultation with CT surgery and Interventional Cardiology, PCI was performed of the distal left main and proximal to mid LAD  This went well without complications  He is without symptoms of angina  It has been 3 months since his PCI, and I do feel that he can discontinue aspirin to avoid "triple therapy", and he will be on Brilinta at least over the next year  Blood work will be followed closely  We did offer him cardiac rehabilitation, but he declined as he is steadily increasing his activity at home  2   Persistent atrial fibrillation - By records it appears that he has been in this over the last couple years, and has been rate controlled  His heart rates have been running a bit low, and he appears to be overmedicated since his hospitalization  He will remain on Toprol- mg daily but I am going to discontinue diltiazem  He will continue to follow his blood pressure and heart rate at home  He is on Eliquis for stroke prevention  We will see him back in 3 months  We will discuss a rhythm control strategy at that visit      3   Ischemic cardiomyopathy - His EF was mildly reduced to 45%  Because of his shortness of breath Lasix was added to his regimen at his hospitalization  He shows no signs of CHF and is active without limitations  Since he is getting periodically lightheaded, I am going to cut his Lasix in half down to 20 mg daily  He should follow a low-sodium diet and watch his weight closely  He will remain on goal-directed medical therapy with lisinopril and Toprol-XL  4   Hyperlipidemia - He will remain on high-intensity statin therapy  We will continue to follow blood work closely  Interval History:     Mr Thor Araujo comes in for follow-up given his history of atrial fibrillation and CAD  He has had persistent atrial fibrillation from what sounds like for the last few years which was controlled on Toprol XL and he has been on Eliquis for stroke prevention  I met him during hospitalization in October which he presented with symptoms of worsening shortness of breath and palpitations, but also had reported an episode of chest pressure [place] the airport a [place] before admission  On admission he was found to be in rapid atrial fibrillation  Echocardiogram showed a mildly reduced ejection fraction of 45% and extensive coronary calcifications were noted on CT  Because of these findings and his symptoms, he underwent a cardiac catheterization which showed significant left main and LAD disease  He was transferred down from the [facility] to [place] for a CT surgical evaluation  In collaboration with CT surgery and Interventional Cardiology, it was felt the best option was PCI to the distal left main / LAD  Later that hospital admission he had drug-eluting stent placement x2 to the LAD and 1 to the left main  This went well without complications  He was discharged on dual anti-platelet therapy with Brilinta, and remained on Eliquis for stroke prevention    His atrial fibrillation came under control with up titration of his Toprol and adding diltiazecassidy  Marsha Samano has for the most part felt well since his hospitalization  He has noted that his heart rates were running low and he was becoming somewhat lightheaded, and cut his Toprol down to once daily  He tells me that he still has intermittent lightheadedness and his heart rates are averaging in the 60s  He denies any return of chest pain / pressure  No return of shortness of breath  He shows no signs or symptoms of CHF  No near-syncope or syncope        Patient Active Problem List   Diagnosis    Epidural lipomatosis    Lower back pain    Lumbar radiculopathy    BMI 39 0-39 9,adult    Chronic pain syndrome    Lumbar stenosis with neurogenic claudication    Spondylolisthesis of lumbar region    Spinal instability, lumbar    PAD (peripheral artery disease) ([facility])    Sleep apnea    Persistent atrial fibrillation ([facility])    Dry cough    Alcohol abuse    Type 2 diabetes mellitus, without long-term current use of insulin ([facility])    Atrial fibrillation ([facility])    Cardiomyopathy ([facility] )    Coronary artery disease    Asymptomatic stenosis of left carotid artery    Status post insertion of drug-eluting stent into left anterior descending (LAD) artery     Past Medical History:   Diagnosis Date    Atrial fibrillation (HCC)     Back pain     Diabetes mellitus ([facility] )     Hyperlipidemia     Hypertension     PAD (peripheral artery disease) ([facility])     Persistent atrial fibrillation (HCC)     Sleep apnea     Spinal stenosis      Social History     Socioeconomic History    Marital status: /Civil Union     Spouse name: Not on file    Number of children: Not on file    Years of education: Not on file    Highest education level: Not on file   Occupational History    Not on file   Social Needs    Financial resource strain: Not on file    Food insecurity     Worry: Not on file     Inability: Not on file    Transportation needs     Medical: Not on file     Non-medical: Not on file   Tobacco Use  Smoking status: Former Smoker    Smokeless tobacco: Never Used   Substance and Sexual Activity    Alcohol use:  Yes     Alcohol/week: 5 0 standard drinks     Types: 5 Cans of beer per week     Drinks per session: 5 or 6     Comment: Social     Drug use: Never    Sexual activity: Yes     Partners: Female   Lifestyle    Physical activity     Days per week: Not on file     Minutes per session: Not on file    Stress: Not on file   Relationships    Social connections     Talks on phone: Not on file     Gets together: Not on file     Attends Restorationism service: Not on file     Active member of club or organization: Not on file     Attends meetings of clubs or organizations: Not on file     Relationship status: Not on file    Intimate partner violence     Fear of current or ex partner: Not on file     Emotionally abused: Not on file     Physically abused: Not on file     Forced sexual activity: Not on file   Other Topics Concern    Not on file   Social History Narrative    Not on file      Family History   Problem Relation Age of Onset    Diabetes Mother     Alzheimer's disease Mother     Cancer Sister     Aneurysm Father      Past Surgical History:   Procedure Laterality Date    CORONARY ANGIOPLASTY WITH STENT PLACEMENT      IR ABDOMINAL AORTAGRAM  12/1/2020       Current Outpatient Medications:     acetaminophen (TYLENOL) 325 mg tablet, Take 650 mg by mouth every 6 (six) hours as needed for mild pain, Disp: , Rfl:     apixaban (Eliquis) 5 mg, Take 5 mg by mouth 2 (two) times a day , Disp: , Rfl:     aspirin (ECOTRIN LOW STRENGTH) 81 mg EC tablet, Take 81 mg by mouth daily, Disp: , Rfl:     atorvastatin (LIPITOR) 80 mg tablet, Take 1 tablet (80 mg total) by mouth daily at bedtime, Disp: 30 tablet, Rfl: 0    DULoxetine (Cymbalta) 60 mg delayed release capsule, Take 60 mg by mouth daily , Disp: , Rfl:     fenofibrate (TRICOR) 54 MG tablet, , Disp: , Rfl:     furosemide (LASIX) 20 mg tablet, Take 1 tablet (20 mg total) by mouth daily, Disp: 30 tablet, Rfl: 6    glimepiride (AMARYL) 4 mg tablet, Take 4 mg by mouth 2 (two) times a day , Disp: , Rfl:     lisinopril (ZESTRIL) 20 mg tablet, Take 20 mg by mouth daily , Disp: , Rfl:     loratadine (CLARITIN) 10 mg tablet, Take 10 mg by mouth daily , Disp: , Rfl:     metFORMIN (GLUCOPHAGE) 500 mg tablet, Take 500 mg by mouth 2 (two) times a day with meals, Disp: , Rfl:     metoprolol succinate (TOPROL-XL) 100 mg 24 hr tablet, Take 1 tablet (100 mg total) by mouth daily, Disp: 60 tablet, Rfl: 0    omega-3-acid ethyl esters (LOVAZA) 1 g capsule, Take 2 g by mouth 2 (two) times a day , Disp: , Rfl:     Semaglutide (OZEMPIC, 1 MG/DOSE, SC), , Disp: , Rfl:     sildenafil (REVATIO) 20 mg tablet, Take 20 mg by mouth, Disp: , Rfl:     ticagrelor (BRILINTA) 90 MG, Take 1 tablet (90 mg total) by mouth every 12 (twelve) hours, Disp: 60 tablet, Rfl: 0  Allergies   Allergen Reactions    Iodinated Diagnostic Agents Facial Swelling       Labs:  Lab Results   Component Value Date    K 3 5 10/31/2020     10/31/2020    CO2 27 10/31/2020    BUN 13 10/31/2020    CREATININE 1 04 10/31/2020    CALCIUM 9 5 10/31/2020     Lab Results   Component Value Date    WBC 15 30 (H) 10/31/2020    HGB 15 8 10/31/2020    HCT 46 1 10/31/2020    MCV 99 (H) 10/31/2020     10/31/2020     Lab Results   Component Value Date    TRIG 214 (H) 10/29/2020    HDL 45 10/29/2020     Imaging:    CARDIAC PCI (10/30/2020):  1ST LESION INTERVENTIONS:  Following pre-dilation, a Resolute [place] Rx 2 5 x 12mm drug-eluting stent was placed across the 95% lesion in mid LAD and deployed at a maximum inflation pressure of 12 mikie  There was 0% residual stenosis   JOSE flow remained grade 3      2ND LESION INTERVENTIONS:  Following pre-dilation and under GuideLiner support, a Xience [address] Rx 3 0 x 38mm drug-eluting stent was positioned to overlap with the stent in mid vessel, while covering a 70% proximal stenosis, and deployed at a maximum inflation  pressure of 18 mikie  There was full stent expansion and apposition by IVUS, with 0% residual stenosis and normal JOSE 3 flow      3RD LESION INTERVENTIONS:  Following pre-dilation and IVUS interrogation, a Xience [place] Rx 3 5 x 23mm drug-eluting stent was placed across the 70% lesion in the left main artery and deployed at a maximum inflation pressure of 18 mikie  There was 0% residual  stenosis  IVUS disclosed full stent expansion and apposition, with minimal luminal cross section area increased from 4 2 to 9 3 sq mm  JOSE flow remained grade 3      Summary:  Successful IVUS-guided unprotected left main and LAD PCI  CARDIAC CATH (11/27/20):  CORONARY CIRCULATION:  The coronary circulation is right dominant  Left main: The vessel was large sized and moderately calcified  There is 40-50% calcified stenosis in mid distal LM  IVUS showed MLA 5 3  LAD: The vessel was medium to large sized and moderately calcified  Proximal LAD has 50% stenosis  Mid LAD is diffusely disease  Immediately after D1 there is 70% stenosis followed by 90% stenosis  Distal LAD has luminal irregualirities  D1 is medium to large caliber long vessel and normal  Circumflex: The vessel was medium sized and moderately calcified  Diffusely disease with maximum stenosis of 50% in mid portion  OM1 is small vessel with proximal 80% focal stenosis  RCA: The vessel was large sized (dominant) and mildly calcified  Proxmial RCA has focal 50% stenosis s/p negative iFR 0 95  Mid RCA has 30% stenosis  RPDA is medium sized vessel with 50% stenosis in mid     CARDIAC STRUCTURES:  There was no aortic stenosis      HEMODYNAMICS:  Hemodynamic assessment demonstrated moderately elevated LVEDP      1ST LESION INTERVENTIONS:  Steady baseline values were obtained  Mean arterial pressure and mean distal coronary pressures were then obtained at maximum hyperemia   Based on the results of this study, the lesion was judged to be non-significant and no intervention  was performed  Proximal RCA: verrata pressure wire was used  IFR 0 95 which is not significant     2ND LESION INTERVENTIONS:  IVUS of LM done: mid distal LM MLA 5 3     INDICATIONS:  Coronary artery disease: suspected CAD  Congestive heart failure with cardiomyopathy  Cardiac: arrhythmia      IMPRESSIONS:  Mid - distal LM calcified stenosis 40-50%, IVUS MLA 5 3  Mid LAD calcified stenosis (70% stenosis followed by 90%)  Moderate disease of mid LCX with maximum of 50% stenosis  Proximal RCA 50% stenosis s/p negative iFR, RPDA 50% stenosis in mid      ECHO (10/25/2020):  LEFT VENTRICLE:  Systolic function was mildly reduced  Ejection fraction was estimated to be 45 %  There was mild diffuse hypokinesis  Wall thickness was mildly increased  There was mild concentric hypertrophy  Features were consistent with a pseudonormal left ventricular filling pattern, with concomitant abnormal relaxation and increased filling pressure (grade 2 diastolic dysfunction)      MITRAL VALVE:  There was trace regurgitation      AORTIC VALVE:  There was trace regurgitation      TRICUSPID VALVE:  There was trace to mild regurgitation  Pulmonary artery systolic pressure was within the normal range      AORTA:  The root exhibited mild dilatation - 3 8 cm  Review of Systems:  Review of Systems   Constitutional: Negative  HENT: Negative  Eyes: Negative  Respiratory: Negative  Cardiovascular: Negative  Gastrointestinal: Negative  Musculoskeletal: Negative  Skin: Negative  Allergic/Immunologic: Negative  Neurological: Positive for light-headedness  Hematological: Negative  Psychiatric/Behavioral: Negative  All other systems reviewed and are negative      Vitals:    01/28/21 0922   BP: 110/70   BP Location: Left arm   Patient Position: Sitting   Cuff Size: Standard   Pulse: 66   Weight: 104 kg (228 lb 11 2 oz)   Height: 5' 8" (1 727 m)       Physical Exam:  Physical Exam  Vitals signs and nursing note reviewed  Constitutional:       Appearance: He is well-developed  HENT:      Head: Normocephalic and atraumatic  Eyes:      General: No scleral icterus  Right eye: No discharge  Left eye: No discharge  Pupils: Pupils are equal, round, and reactive to light  Neck:      Musculoskeletal: Normal range of motion and neck supple  Thyroid: No thyromegaly  Vascular: No JVD  Cardiovascular:      Rate and Rhythm: Normal rate and regular rhythm  No extrasystoles are present  Pulses: Normal pulses  No decreased pulses  Heart sounds: Normal heart sounds, S1 normal and S2 normal  No murmur  No friction rub  No gallop  Pulmonary:      Effort: Pulmonary effort is normal  No respiratory distress  Breath sounds: Normal breath sounds  No wheezing or rales  Abdominal:      General: Bowel sounds are normal  There is no distension  Palpations: Abdomen is soft  Tenderness: There is no abdominal tenderness  Musculoskeletal: Normal range of motion  General: No tenderness or deformity  Skin:     General: Skin is warm and dry  Findings: No rash  Neurological:      Mental Status: He is alert and oriented to person, place, and time  Cranial Nerves: No cranial nerve deficit  Psychiatric:         Thought Content: Thought content normal          Judgment: Judgment normal      Counseling / Coordination of Care  Total office time spent today 25 minutes  Greater than 50% of total time was spent with the patient and / or family counseling and / or coordination of care

## 2021-03-03 ENCOUNTER — TELEPHONE (OUTPATIENT)
Dept: FAMILY MEDICINE CLINIC | Facility: CLINIC | Age: 61
End: 2021-03-03

## 2021-03-10 DIAGNOSIS — Z23 ENCOUNTER FOR IMMUNIZATION: ICD-10-CM

## 2021-03-18 ENCOUNTER — HOSPITAL ENCOUNTER (OUTPATIENT)
Dept: NON INVASIVE DIAGNOSTICS | Facility: CLINIC | Age: 61
Discharge: HOME | End: 2021-03-18
Payer: COMMERCIAL

## 2021-03-18 DIAGNOSIS — I73.9 PAD (PERIPHERAL ARTERY DISEASE) (HCC): ICD-10-CM

## 2021-03-18 PROCEDURE — 93922 UPR/L XTREMITY ART 2 LEVELS: CPT | Performed by: SURGERY

## 2021-03-18 PROCEDURE — 93925 LOWER EXTREMITY STUDY: CPT

## 2021-03-18 PROCEDURE — 93925 LOWER EXTREMITY STUDY: CPT | Performed by: SURGERY

## 2021-03-18 PROCEDURE — 93923 UPR/LXTR ART STDY 3+ LVLS: CPT

## 2021-03-20 ENCOUNTER — IMMUNIZATIONS (OUTPATIENT)
Dept: FAMILY MEDICINE CLINIC | Facility: HOSPITAL | Age: 61
End: 2021-03-20

## 2021-03-20 DIAGNOSIS — Z23 ENCOUNTER FOR IMMUNIZATION: Primary | ICD-10-CM

## 2021-03-20 PROCEDURE — 91300 SARS-COV-2 / COVID-19 MRNA VACCINE (PFIZER-BIONTECH) 30 MCG: CPT

## 2021-03-20 PROCEDURE — 0001A SARS-COV-2 / COVID-19 MRNA VACCINE (PFIZER-BIONTECH) 30 MCG: CPT

## 2021-04-02 ENCOUNTER — TRANSCRIBE ORDERS (OUTPATIENT)
Dept: VASCULAR SURGERY | Facility: CLINIC | Age: 61
End: 2021-04-02

## 2021-04-02 DIAGNOSIS — I73.9 PAD (PERIPHERAL ARTERY DISEASE) (HCC): Primary | ICD-10-CM

## 2021-04-10 ENCOUNTER — IMMUNIZATIONS (OUTPATIENT)
Dept: FAMILY MEDICINE CLINIC | Facility: HOSPITAL | Age: 61
End: 2021-04-10

## 2021-04-10 DIAGNOSIS — Z23 ENCOUNTER FOR IMMUNIZATION: Primary | ICD-10-CM

## 2021-04-10 PROCEDURE — 91300 SARS-COV-2 / COVID-19 MRNA VACCINE (PFIZER-BIONTECH) 30 MCG: CPT

## 2021-04-10 PROCEDURE — 0002A SARS-COV-2 / COVID-19 MRNA VACCINE (PFIZER-BIONTECH) 30 MCG: CPT

## 2021-04-22 ENCOUNTER — OFFICE VISIT (OUTPATIENT)
Dept: URGENT CARE | Facility: CLINIC | Age: 61
End: 2021-04-22
Payer: COMMERCIAL

## 2021-04-22 ENCOUNTER — APPOINTMENT (OUTPATIENT)
Dept: RADIOLOGY | Facility: CLINIC | Age: 61
End: 2021-04-22
Payer: COMMERCIAL

## 2021-04-22 VITALS
DIASTOLIC BLOOD PRESSURE: 87 MMHG | WEIGHT: 218 LBS | BODY MASS INDEX: 33.04 KG/M2 | RESPIRATION RATE: 18 BRPM | HEART RATE: 80 BPM | OXYGEN SATURATION: 97 % | SYSTOLIC BLOOD PRESSURE: 125 MMHG | TEMPERATURE: 97.1 F | HEIGHT: 68 IN

## 2021-04-22 DIAGNOSIS — M25.562 ACUTE PAIN OF LEFT KNEE: ICD-10-CM

## 2021-04-22 DIAGNOSIS — M25.562 ACUTE PAIN OF LEFT KNEE: Primary | ICD-10-CM

## 2021-04-22 PROCEDURE — 73564 X-RAY EXAM KNEE 4 OR MORE: CPT

## 2021-04-22 PROCEDURE — 99203 OFFICE O/P NEW LOW 30 MIN: CPT | Performed by: PHYSICIAN ASSISTANT

## 2021-04-22 NOTE — PROGRESS NOTES
[facility]        NAME: Angle Sheriff is a 63 y o  male  : 1960    MRN: [id number]  DATE: 2021  TIME: 4:34 PM    Assessment and Plan   Acute pain of left knee [M25 562]  1  Acute pain of left knee  XR knee 4+ vw left injury    Ambulatory referral to Orthopedic Surgery         Patient Instructions     Patient Instructions     Knee Pain   WHAT YOU NEED TO KNOW:   Knee pain may start suddenly, or it may be a long-term problem  You may have pain on the side, front, or back of your knee  You may have knee stiffness and swelling  You may hear popping sounds or feel like your knee is giving way or locking up as you walk  You may feel pain when you sit, stand, walk, or climb up and down stairs  Knee pain can be caused by conditions such as obesity, inflammation, or strains or tears in ligaments or tendons  DISCHARGE INSTRUCTIONS:   Return to the emergency department if:   · Your pain is worse, even after treatment  · You cannot bend or straighten your leg completely  · The swelling around your knee does not go down even with treatment  · Your knee is painful and hot to the touch  Contact your healthcare provider if:   · You have questions or concerns about your condition or care  Medicines: You may need any of the following:  · NSAIDs  help decrease swelling and pain or fever  This medicine is available with or without a doctor's order  NSAIDs can cause stomach bleeding or kidney problems in certain people  If you take blood thinner medicine, always ask your healthcare provider if NSAIDs are safe for you  Always read the medicine label and follow directions  · Acetaminophen  decreases pain and fever  It is available without a doctor's order  Ask how much to take and how often to take it  Follow directions   Read the labels of all other medicines you are using to see if they also contain acetaminophen, or ask your doctor or pharmacist  Acetaminophen can cause liver damage if not taken correctly  Do not use more than 4 grams (4,000 milligrams) total of acetaminophen in one day  · Prescription pain medicine  may be given  Ask your healthcare provider how to take this medicine safely  Some prescription pain medicines contain acetaminophen  Do not take other medicines that contain acetaminophen without talking to your healthcare provider  Too much acetaminophen may cause liver damage  Prescription pain medicine may cause constipation  Ask your healthcare provider how to prevent or treat constipation  · Take your medicine as directed  Contact your healthcare provider if you think your medicine is not helping or if you have side effects  Tell him or her if you are allergic to any medicine  Keep a list of the medicines, vitamins, and herbs you take  Include the amounts, and when and why you take them  Bring the list or the pill bottles to follow-up visits  Carry your medicine list with you in case of an emergency  What you can do to manage your symptoms:   · Rest your knee so it can heal   Limit activities that increase your pain  Do low-impact exercises, such as walking or swimming  · Apply ice to help reduce swelling and pain  Use an ice pack, or put crushed ice in a plastic bag  Cover it with a towel before you apply it to your knee  Apply ice for 15 to 20 minutes every hour, or as directed  · Apply compression to help reduce swelling  Use a brace or bandage only as directed  · Elevate your knee to help decrease pain and swelling  Elevate your knee while you are sitting or lying down  Prop your leg on pillows to keep your knee above the level of your heart  · Prevent your knee from moving as directed  Your healthcare provider may put on a cast or splint  You may need to wear a leg brace to stabilize your knee  A leg brace can be adjusted to increase your range of motion as your knee heals         What you can do to prevent knee pain:   · Maintain a healthy weight  Extra weight increases your risk for knee pain  Ask your healthcare provider how much you should weigh  He or she can help you create a safe weight loss plan if you need to lose weight  · Exercise or train properly  Use the correct equipment for sports  Wear shoes that provide good support  Check your posture often as you exercise, play sports, or train for an event  This can help prevent stress and strain on your knees  Rest between sessions so you do not overwork your knees  Follow up with your healthcare provider within 24 hours or as directed: You may need follow-up treatments, such as steroid injections to decrease pain  Write down your questions so you remember to ask them during your visits  © Copyright [address] Information is for End User's use only and may not be sold, redistributed or otherwise used for commercial purposes  All illustrations and images included in CareNotes® are the copyrighted property of [employer] Inc  or [employer]   The above information is an  only  It is not intended as medical advice for individual conditions or treatments  Talk to your doctor, nurse or pharmacist before following any medical regimen to see if it is safe and effective for you  Follow up with PCP in 3-5 days  Proceed to  ER if symptoms worsen  Chief Complaint     Chief Complaint   Patient presents with    Knee Pain     states that it feels like something is loose in the knee, some burning, denies injury, hurting for the past few nae         History of Present Illness       Yesterday day started feeling burning over the distal knee and says it feels like something is loose  Occurred slowly out of no where  Nothing aggravates the burning or makes it feel better  Denies injury to the area or doing anything out of the norm of his normal ADLs   Denies swelling, bruising, numbness/tingling, decreased ROM, pain with walking, instability, or the leg giving out        Review of Systems   Review of Systems   Musculoskeletal: Positive for arthralgias  Negative for gait problem and joint swelling  Skin: Negative for rash  Neurological: Negative for weakness and numbness  Psychiatric/Behavioral: Negative for confusion           Current Medications       Current Outpatient Medications:     acetaminophen (TYLENOL) 325 mg tablet, Take 650 mg by mouth every 6 (six) hours as needed for mild pain, Disp: , Rfl:     apixaban (Eliquis) 5 mg, Take 5 mg by mouth 2 (two) times a day , Disp: , Rfl:     atorvastatin (LIPITOR) 80 mg tablet, Take 1 tablet (80 mg total) by mouth daily at bedtime, Disp: 30 tablet, Rfl: 0    DULoxetine (Cymbalta) 60 mg delayed release capsule, Take 60 mg by mouth daily , Disp: , Rfl:     fenofibrate (TRICOR) 54 MG tablet, , Disp: , Rfl:     furosemide (LASIX) 20 mg tablet, Take 1 tablet (20 mg total) by mouth daily, Disp: 30 tablet, Rfl: 6    glimepiride (AMARYL) 4 mg tablet, Take 4 mg by mouth 2 (two) times a day , Disp: , Rfl:     lisinopril (ZESTRIL) 20 mg tablet, Take 20 mg by mouth daily , Disp: , Rfl:     loratadine (CLARITIN) 10 mg tablet, Take 10 mg by mouth daily , Disp: , Rfl:     metFORMIN (GLUCOPHAGE) 500 mg tablet, Take 500 mg by mouth 2 (two) times a day with meals, Disp: , Rfl:     metoprolol succinate (TOPROL-XL) 100 mg 24 hr tablet, Take 1 tablet (100 mg total) by mouth daily, Disp: 60 tablet, Rfl: 0    omega-3-acid ethyl esters (LOVAZA) 1 g capsule, Take 2 g by mouth 2 (two) times a day , Disp: , Rfl:     Semaglutide (OZEMPIC, 1 MG/DOSE, SC), , Disp: , Rfl:     sildenafil (REVATIO) 20 mg tablet, Take 20 mg by mouth, Disp: , Rfl:     ticagrelor (BRILINTA) 90 MG, Take 1 tablet (90 mg total) by mouth every 12 (twelve) hours, Disp: 60 tablet, Rfl: 0    aspirin (ECOTRIN LOW STRENGTH) 81 mg EC tablet, Take 81 mg by mouth daily, Disp: , Rfl:     Current Allergies     Allergies as of 04/22/2021 - Reviewed 04/22/2021   Allergen Reaction Noted    Iodinated diagnostic agents Facial Swelling 02/19/2020            The following portions of the patient's history were reviewed and updated as appropriate: allergies, current medications, past family history, past medical history, past social history, past surgical history and problem list      Past Medical History:   Diagnosis Date    [facility] fibrillation ([facility] )     Back pain     Diabetes mellitus ([facility] )     Hyperlipidemia     Hypertension     PAD (peripheral artery disease) ([facility] )     Persistent atrial fibrillation ([place] [address] )     Sleep apnea     Spinal stenosis        Past Surgical History:   Procedure Laterality Date    CORONARY ANGIOPLASTY WITH STENT PLACEMENT      IR ABDOMINAL AORTAGRAM  12/1/2020       Family History   Problem Relation Age of Onset    Diabetes Mother     Alzheimer's disease Mother     Cancer Sister     Aneurysm Father          Medications have been verified  Objective   /87   Pulse 80   Temp (!) 97 1 °F (36 2 °C) (Temporal)   Resp 18   Ht 5' 8" (1 727 m)   Wt 98 9 kg (218 lb)   SpO2 97%   BMI 33 15 kg/m²        Physical Exam     Physical Exam  Constitutional:       Appearance: Normal appearance  Musculoskeletal: Normal range of motion  General: No swelling, tenderness or deformity  Comments: No laxity of ligaments noted  Anterior/posterior drawer negative  Skin:     General: Skin is warm  Capillary Refill: Capillary refill takes less than 2 seconds  Findings: No bruising, erythema or rash  Neurological:      Mental Status: He is alert     Psychiatric:         Mood and Affect: Mood normal          Behavior: Behavior normal

## 2021-04-22 NOTE — PATIENT INSTRUCTIONS
Knee Pain   WHAT YOU NEED TO KNOW:   Knee pain may start suddenly, or it may be a long-term problem  You may have pain on the side, front, or back of your knee  You may have knee stiffness and swelling  You may hear popping sounds or feel like your knee is giving way or locking up as you walk  You may feel pain when you sit, stand, walk, or climb up and down stairs  Knee pain can be caused by conditions such as obesity, inflammation, or strains or tears in ligaments or tendons  DISCHARGE INSTRUCTIONS:   Return to the emergency department if:   · Your pain is worse, even after treatment  · You cannot bend or straighten your leg completely  · The swelling around your knee does not go down even with treatment  · Your knee is painful and hot to the touch  Contact your healthcare provider if:   · You have questions or concerns about your condition or care  Medicines: You may need any of the following:  · NSAIDs  help decrease swelling and pain or fever  This medicine is available with or without a doctor's order  NSAIDs can cause stomach bleeding or kidney problems in certain people  If you take blood thinner medicine, always ask your healthcare provider if NSAIDs are safe for you  Always read the medicine label and follow directions  · Acetaminophen  decreases pain and fever  It is available without a doctor's order  Ask how much to take and how often to take it  Follow directions  Read the labels of all other medicines you are using to see if they also contain acetaminophen, or ask your doctor or pharmacist  Acetaminophen can cause liver damage if not taken correctly  Do not use more than 4 grams (4,000 milligrams) total of acetaminophen in one day  · Prescription pain medicine  may be given  Ask your healthcare provider how to take this medicine safely  Some prescription pain medicines contain acetaminophen   Do not take other medicines that contain acetaminophen without talking to your healthcare provider  Too much acetaminophen may cause liver damage  Prescription pain medicine may cause constipation  Ask your healthcare provider how to prevent or treat constipation  · Take your medicine as directed  Contact your healthcare provider if you think your medicine is not helping or if you have side effects  Tell him or her if you are allergic to any medicine  Keep a list of the medicines, vitamins, and herbs you take  Include the amounts, and when and why you take them  Bring the list or the pill bottles to follow-up visits  Carry your medicine list with you in case of an emergency  What you can do to manage your symptoms:   · Rest your knee so it can heal   Limit activities that increase your pain  Do low-impact exercises, such as walking or swimming  · Apply ice to help reduce swelling and pain  Use an ice pack, or put crushed ice in a plastic bag  Cover it with a towel before you apply it to your knee  Apply ice for 15 to 20 minutes every hour, or as directed  · Apply compression to help reduce swelling  Use a brace or bandage only as directed  · Elevate your knee to help decrease pain and swelling  Elevate your knee while you are sitting or lying down  Prop your leg on pillows to keep your knee above the level of your heart  · Prevent your knee from moving as directed  Your healthcare provider may put on a cast or splint  You may need to wear a leg brace to stabilize your knee  A leg brace can be adjusted to increase your range of motion as your knee heals  What you can do to prevent knee pain:   · Maintain a healthy weight  Extra weight increases your risk for knee pain  Ask your healthcare provider how much you should weigh  He or she can help you create a safe weight loss plan if you need to lose weight  · Exercise or train properly  Use the correct equipment for sports  Wear shoes that provide good support   Check your posture often as you exercise, play sports, or train for an event  This can help prevent stress and strain on your knees  Rest between sessions so you do not overwork your knees  Follow up with your healthcare provider within 24 hours or as directed: You may need follow-up treatments, such as steroid injections to decrease pain  Write down your questions so you remember to ask them during your visits  © Copyright [address] Information is for End User's use only and may not be sold, redistributed or otherwise used for commercial purposes  All illustrations and images included in CareNotes® are the copyrighted property of [employer]  or [facility] Jose Rafael Brunner   The above information is an  only  It is not intended as medical advice for individual conditions or treatments  Talk to your doctor, nurse or pharmacist before following any medical regimen to see if it is safe and effective for you

## 2021-05-12 ENCOUNTER — TELEPHONE (OUTPATIENT)
Dept: GASTROENTEROLOGY | Facility: CLINIC | Age: 61
End: 2021-05-12

## 2021-06-22 ENCOUNTER — OFFICE VISIT (OUTPATIENT)
Dept: GASTROENTEROLOGY | Facility: CLINIC | Age: 61
End: 2021-06-22
Payer: COMMERCIAL

## 2021-06-22 ENCOUNTER — APPOINTMENT (OUTPATIENT)
Dept: LAB | Facility: HOSPITAL | Age: 61
End: 2021-06-22
Attending: INTERNAL MEDICINE
Payer: COMMERCIAL

## 2021-06-22 VITALS
HEIGHT: 68 IN | WEIGHT: 220 LBS | HEART RATE: 84 BPM | DIASTOLIC BLOOD PRESSURE: 82 MMHG | SYSTOLIC BLOOD PRESSURE: 120 MMHG | BODY MASS INDEX: 33.34 KG/M2 | RESPIRATION RATE: 16 BRPM

## 2021-06-22 DIAGNOSIS — K59.1 FUNCTIONAL DIARRHEA: Primary | ICD-10-CM

## 2021-06-22 DIAGNOSIS — R10.30 LOWER ABDOMINAL PAIN: ICD-10-CM

## 2021-06-22 DIAGNOSIS — K59.1 FUNCTIONAL DIARRHEA: ICD-10-CM

## 2021-06-22 PROCEDURE — 1036F TOBACCO NON-USER: CPT | Performed by: INTERNAL MEDICINE

## 2021-06-22 PROCEDURE — 99204 OFFICE O/P NEW MOD 45 MIN: CPT | Performed by: INTERNAL MEDICINE

## 2021-06-22 PROCEDURE — 87329 GIARDIA AG IA: CPT

## 2021-06-22 PROCEDURE — 87177 OVA AND PARASITES SMEARS: CPT

## 2021-06-22 PROCEDURE — 87493 C DIFF AMPLIFIED PROBE: CPT

## 2021-06-22 PROCEDURE — 3008F BODY MASS INDEX DOCD: CPT | Performed by: INTERNAL MEDICINE

## 2021-06-22 PROCEDURE — 87209 SMEAR COMPLEX STAIN: CPT

## 2021-06-22 RX ORDER — GABAPENTIN 600 MG/1
TABLET ORAL
COMMUNITY
End: 2021-10-21 | Stop reason: HOSPADM

## 2021-06-22 RX ORDER — SEMAGLUTIDE 1.34 MG/ML
INJECTION, SOLUTION SUBCUTANEOUS
COMMUNITY
Start: 2021-05-30 | End: 2022-08-02

## 2021-06-22 NOTE — PROGRESS NOTES
Consultation - [facility]  Daisha Quijanoh 1960 57 y o  male     ASSESSMENT @ PLAN:   He is a 78-year-old male with severe bilateral lower quadrant abdominal pain explosive watery stools 3 times a day since starting Ozempic a few months ago  1 will do EGD and colonoscopy to investigate    2 will do stool testing    Chief Complaint:  Abdominal pain and diarrhea    HPI:  He is a 78-year-old male with explosive stools and diarrhea and abdominal pain  He reports that since starting Ozempic for his diarrhea he has had postprandial diarrhea  He reports he goes 2 to 3 times a day it is loose it is brown it is water and explosive he has pain prior to each bowel movement  He reports there is severe urgency and 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 he has no melena or hematochezia  He does travel and within the [employer]  for work in the last year  He has had no antibiotics in last 3-6 months  He does not drink from a well  Nobody in the family has ulcerative colitis or Crohn's disease  He reports he had a colonoscopy about fiber 6 years ago that was normal   He really does not have upper abdominal symptoms  He has no heartburn regurgitation no dysphagia  He has rare nausea no vomiting  He has lost weight trying to be healthier  He had heart stents put in in November of last year and is on Brilinta  He also has atrial fibrillation and is on Eliquis  REVIEW OF SYSTEMS:     CONSTITUTIONAL: Denies any fever, chills, or rigors  Good appetite, and no recent weight loss  HEENT: No earache or tinnitus  Denies hearing loss or visual disturbances  CARDIOVASCULAR: No chest pain or palpitations  RESPIRATORY: Denies any cough, hemoptysis, shortness of breath or dyspnea on exertion  GASTROINTESTINAL: As noted in the History of Present Illness  GENITOURINARY: No problems with urination   Denies any hematuria or dysuria  NEUROLOGIC: No dizziness or vertigo, denies headaches  MUSCULOSKELETAL: Denies any muscle or joint pain  SKIN: Denies skin rashes or itching  ENDOCRINE: Denies excessive thirst  Denies intolerance to heat or cold  PSYCHOSOCIAL: Denies depression or anxiety  Denies any recent memory loss  Past Medical History:   Diagnosis Date    [facility] fibrillation ([facility] )     Back pain     Diabetes mellitus ([place] [address] )     Hyperlipidemia     Hypertension     PAD (peripheral artery disease) ([facility])     Persistent atrial fibrillation ([facility])     Sleep apnea     Spinal stenosis       Past Surgical History:   Procedure Laterality Date    CORONARY ANGIOPLASTY WITH STENT PLACEMENT      IR ABDOMINAL AORTAGRAM  12/1/2020     Social History     Socioeconomic History    Marital status: /Civil Union     Spouse name: Not on file    Number of children: Not on file    Years of education: Not on file    Highest education level: Not on file   Occupational History    Not on file   Tobacco Use    Smoking status: Former Smoker    Smokeless tobacco: Never Used   Vaping Use    Vaping Use: Never used   Substance and Sexual Activity    Alcohol use: Yes     Alcohol/week: 5 0 standard drinks     Types: 5 Cans of beer per week     Comment: Social     Drug use: Never    Sexual activity: Yes     Partners: Female   Other Topics Concern    Not on file   Social History Narrative    Not on file     Social Determinants of Health     Financial Resource Strain:     Difficulty of Paying Living Expenses:    Food Insecurity:     Worried About Running Out of Food in the Last Year:     [address] in the Last Year:    Transportation Needs:     Lack of Transportation (Medical):      Lack of Transportation (Non-Medical):    Physical Activity:     Days of Exercise per Week:     Minutes of Exercise per Session:    Stress:     Feeling of Stress :    Social Connections:     Frequency of Communication with Friends and Family:  Frequency of Social Gatherings with Friends and Family:     Attends L[employer] day Services:     Active Member of Clubs or Organizations:     Attends Club or Organization Meetings:     Marital Status:    Intimate Partner Violence:     Fear of Current or Ex-Partner:     Emotionally Abused:     Physically Abused:     Sexually Abused:      Family History   Problem Relation Age of Onset    Diabetes Mother     Alzheimer's disease Mother     Cancer Sister     Aneurysm Father      Iodinated diagnostic agents  Current Outpatient Medications   Medication Sig Dispense Refill    apixaban (Eliquis) 5 mg Take 5 mg by mouth 2 (two) times a day       atorvastatin (LIPITOR) 80 mg tablet Take 1 tablet (80 mg total) by mouth daily at bedtime 30 tablet 0    DULoxetine (Cymbalta) 60 mg delayed release capsule Take 60 mg by mouth daily       fenofibrate (TRICOR) 54 MG tablet       furosemide (LASIX) 20 mg tablet Take 1 tablet (20 mg total) by mouth daily 30 tablet 6    glimepiride (AMARYL) 4 mg tablet Take 4 mg by mouth 2 (two) times a day       lisinopril (ZESTRIL) 20 mg tablet Take 20 mg by mouth daily       loratadine (CLARITIN) 10 mg tablet Take 10 mg by mouth daily       metFORMIN (GLUCOPHAGE) 500 mg tablet Take 500 mg by mouth 2 (two) times a day with meals      metoprolol succinate (TOPROL-XL) 100 mg 24 hr tablet Take 1 tablet (100 mg total) by mouth daily 60 tablet 0    omega-3-acid ethyl esters (LOVAZA) 1 g capsule Take 2 g by mouth 2 (two) times a day       Ozempic, 1 MG/DOSE, 4 MG/3ML SOPN injection pen       ticagrelor (BRILINTA) 90 MG Take 1 tablet (90 mg total) by mouth every 12 (twelve) hours 60 tablet 0    acetaminophen (TYLENOL) 325 mg tablet Take 650 mg by mouth every 6 (six) hours as needed for mild pain (Patient not taking: Reported on 6/22/2021)      aspirin (ECOTRIN LOW STRENGTH) 81 mg EC tablet Take 81 mg by mouth daily (Patient not taking: Reported on 6/22/2021)      gabapentin (NEURONTIN) 600 MG tablet gabapentin 600 mg tablet (Patient not taking: Reported on 6/22/2021)      Semaglutide (OZEMPIC, 1 MG/DOSE, SC)  (Patient not taking: Reported on 6/22/2021)      sildenafil (REVATIO) 20 mg tablet Take 20 mg by mouth (Patient not taking: Reported on 6/22/2021)       No current facility-administered medications for this visit  Blood pressure 120/82, pulse 84, resp  rate 16, height 5' 8" (1 727 m), weight 99 8 kg (220 lb)  PHYSICAL EXAM:     General Appearance:   Alert, cooperative, no distress, appears stated age    HEENT:   Normocephalic, atraumatic, anicteric      Neck:  Supple, symmetrical, trachea midline, no adenopathy;    thyroid: no enlargement/tenderness/nodules; no carotid  bruit or JVD    Lungs:   Clear to auscultation bilaterally; no rales, rhonchi or wheezing; respirations unlabored    Heart[de-identified]   S1 and S2 normal; regular rate and rhythm; no murmur, rub, or gallop     Abdomen:   Soft, non-tender, non-distended; normal bowel sounds; no masses, no organomegaly    Genitalia:   Deferred    Rectal:   Deferred    Extremities:  No cyanosis, clubbing or edema    Pulses:  2+ and symmetric all extremities    Skin:  Skin color, texture, turgor normal, no rashes or lesions    Lymph nodes:  No palpable cervical, axillary or inguinal lymphadenopathy        Lab Results   Component Value Date    WBC 15 30 (H) 10/31/2020    HGB 15 8 10/31/2020    HCT 46 1 10/31/2020    MCV 99 (H) 10/31/2020     10/31/2020     Lab Results   Component Value Date    CALCIUM 9 5 10/31/2020    K 3 5 10/31/2020    CO2 27 10/31/2020     10/31/2020    BUN 13 10/31/2020    CREATININE 1 04 10/31/2020     Lab Results   Component Value Date    ALT 59 10/24/2020    AST 27 10/24/2020    ALKPHOS 56 10/24/2020     Lab Results   Component Value Date    INR 1 05 10/27/2020    INR 1 14 10/24/2020    PROTIME 13 9 10/27/2020    PROTIME 14 1 10/24/2020           Answers for HPI/ROS submitted by the patient on 6/20/2021  Chronicity: recurrent  Onset: more than 1 year ago  Onset quality: sudden  Frequency: daily  Episode duration: 2 hours  Progression since onset: gradually worsening  Pain location: periumbilical region, suprapubic region  Pain - numeric: 7/10  Pain quality: burning, cramping  Radiates to: periumbilical region, suprapubic region, perineum  anorexia: No  arthralgias: Yes  belching: No  constipation: No  diarrhea: Yes  dysuria: No  fever: No  flatus: Yes  frequency: Yes  headaches: No  hematochezia: No  hematuria: No  melena: No  myalgias: No  nausea:  No  weight loss: Yes  vomiting: No  Aggravated by: eating  Relieved by: bowel movements

## 2021-06-22 NOTE — H&P (VIEW-ONLY)
Consultation - [address] [facility]  Albert Boateng 1960 57 y o  male     ASSESSMENT @ PLAN:   He is a 58-year-old male with severe bilateral lower quadrant abdominal pain explosive watery stools 3 times a day since starting Ozempic a few months ago  1 will do EGD and colonoscopy to investigate    2 will do stool testing    Chief Complaint:  Abdominal pain and diarrhea    HPI:  He is a 58-year-old male with explosive stools and diarrhea and abdominal pain  He reports that since starting Ozempic for his diarrhea he has had postprandial diarrhea  He reports he goes 2 to 3 times a day it is loose it is brown it is water and explosive he has pain prior to each bowel movement  He reports there is severe urgency and 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 he has no melena or hematochezia  He does travel and within the [employer]  for work in the last year  He has had no antibiotics in last 3-6 months  He does not drink from a well  Nobody in the family has ulcerative colitis or Crohn's disease  He reports he had a colonoscopy about fiber 6 years ago that was normal   He really does not have upper abdominal symptoms  He has no heartburn regurgitation no dysphagia  He has rare nausea no vomiting  He has lost weight trying to be healthier  He had heart stents put in in November of last year and is on Brilinta  He also has atrial fibrillation and is on Eliquis  REVIEW OF SYSTEMS:     CONSTITUTIONAL: Denies any fever, chills, or rigors  Good appetite, and no recent weight loss  HEENT: No earache or tinnitus  Denies hearing loss or visual disturbances  CARDIOVASCULAR: No chest pain or palpitations  RESPIRATORY: Denies any cough, hemoptysis, shortness of breath or dyspnea on exertion  GASTROINTESTINAL: As noted in the History of Present Illness  GENITOURINARY: No problems with urination   Denies any hematuria or dysuria  NEUROLOGIC: No dizziness or vertigo, denies headaches  MUSCULOSKELETAL: Denies any muscle or joint pain  SKIN: Denies skin rashes or itching  ENDOCRINE: Denies excessive thirst  Denies intolerance to heat or cold  PSYCHOSOCIAL: Denies depression or anxiety  Denies any recent memory loss  Past Medical History:   Diagnosis Date    Atrial fibrillation ([facility] )     Back pain     Diabetes mellitus ([place] [address] )     Hyperlipidemia     Hypertension     PAD (peripheral artery disease) (HCC)     Persistent atrial fibrillation (HCC)     Sleep apnea     Spinal stenosis       Past Surgical History:   Procedure Laterality Date    CORONARY ANGIOPLASTY WITH STENT PLACEMENT      IR ABDOMINAL AORTAGRAM  12/1/2020     Social History     Socioeconomic History    Marital status: /Civil Union     Spouse name: Not on file    Number of children: Not on file    Years of education: Not on file    Highest education level: Not on file   Occupational History    Not on file   Tobacco Use    Smoking status: Former Smoker    Smokeless tobacco: Never Used   Vaping Use    Vaping Use: Never used   Substance and Sexual Activity    Alcohol use: Yes     Alcohol/week: 5 0 standard drinks     Types: 5 Cans of beer per week     Comment: Social     Drug use: Never    Sexual activity: Yes     Partners: Female   Other Topics Concern    Not on file   Social History Narrative    Not on file     Social Determinants of Health     Financial Resource Strain:     Difficulty of Paying Living Expenses:    Food Insecurity:     Worried About Running Out of Food in the Last Year:     [address] in the Last Year:    Transportation Needs:     Lack of Transportation (Medical):      Lack of Transportation (Non-Medical):    Physical Activity:     Days of Exercise per Week:     Minutes of Exercise per Session:    Stress:     Feeling of Stress :    Social Connections:     Frequency of Communication with Friends and Family:  Frequency of Social Gatherings with Friends and Family:     Attends Church Services:     Active Member of Clubs or Organizations:     Attends Club or Organization Meetings:     Marital Status:    Intimate Partner Violence:     Fear of Current or Ex-Partner:     Emotionally Abused:     Physically Abused:     Sexually Abused:      Family History   Problem Relation Age of Onset    Diabetes Mother     Alzheimer's disease Mother     Cancer Sister     Aneurysm Father      Iodinated diagnostic agents  Current Outpatient Medications   Medication Sig Dispense Refill    apixaban (Eliquis) 5 mg Take 5 mg by mouth 2 (two) times a day       atorvastatin (LIPITOR) 80 mg tablet Take 1 tablet (80 mg total) by mouth daily at bedtime 30 tablet 0    DULoxetine (Cymbalta) 60 mg delayed release capsule Take 60 mg by mouth daily       fenofibrate (TRICOR) 54 MG tablet       furosemide (LASIX) 20 mg tablet Take 1 tablet (20 mg total) by mouth daily 30 tablet 6    glimepiride (AMARYL) 4 mg tablet Take 4 mg by mouth 2 (two) times a day       lisinopril (ZESTRIL) 20 mg tablet Take 20 mg by mouth daily       loratadine (CLARITIN) 10 mg tablet Take 10 mg by mouth daily       metFORMIN (GLUCOPHAGE) 500 mg tablet Take 500 mg by mouth 2 (two) times a day with meals      metoprolol succinate (TOPROL-XL) 100 mg 24 hr tablet Take 1 tablet (100 mg total) by mouth daily 60 tablet 0    omega-3-acid ethyl esters (LOVAZA) 1 g capsule Take 2 g by mouth 2 (two) times a day       Ozempic, 1 MG/DOSE, 4 MG/3ML SOPN injection pen       ticagrelor (BRILINTA) 90 MG Take 1 tablet (90 mg total) by mouth every 12 (twelve) hours 60 tablet 0    acetaminophen (TYLENOL) 325 mg tablet Take 650 mg by mouth every 6 (six) hours as needed for mild pain (Patient not taking: Reported on 6/22/2021)      aspirin (ECOTRIN LOW STRENGTH) 81 mg EC tablet Take 81 mg by mouth daily (Patient not taking: Reported on 6/22/2021)      gabapentin (NEURONTIN) 600 MG tablet gabapentin 600 mg tablet (Patient not taking: Reported on 6/22/2021)      Semaglutide (OZEMPIC, 1 MG/DOSE, SC)  (Patient not taking: Reported on 6/22/2021)      sildenafil (REVATIO) 20 mg tablet Take 20 mg by mouth (Patient not taking: Reported on 6/22/2021)       No current facility-administered medications for this visit  Blood pressure 120/82, pulse 84, resp  rate 16, height 5' 8" (1 727 m), weight 99 8 kg (220 lb)  PHYSICAL EXAM:     General Appearance:   Alert, cooperative, no distress, appears stated age    HEENT:   Normocephalic, atraumatic, anicteric      Neck:  Supple, symmetrical, trachea midline, no adenopathy;    thyroid: no enlargement/tenderness/nodules; no carotid  bruit or JVD    Lungs:   Clear to auscultation bilaterally; no rales, rhonchi or wheezing; respirations unlabored    Heart[de-identified]   S1 and S2 normal; regular rate and rhythm; no murmur, rub, or gallop     Abdomen:   Soft, non-tender, non-distended; normal bowel sounds; no masses, no organomegaly    Genitalia:   Deferred    Rectal:   Deferred    Extremities:  No cyanosis, clubbing or edema    Pulses:  2+ and symmetric all extremities    Skin:  Skin color, texture, turgor normal, no rashes or lesions    Lymph nodes:  No palpable cervical, axillary or inguinal lymphadenopathy        Lab Results   Component Value Date    WBC 15 30 (H) 10/31/2020    HGB 15 8 10/31/2020    HCT 46 1 10/31/2020    MCV 99 (H) 10/31/2020     10/31/2020     Lab Results   Component Value Date    CALCIUM 9 5 10/31/2020    K 3 5 10/31/2020    CO2 27 10/31/2020     10/31/2020    BUN 13 10/31/2020    CREATININE 1 04 10/31/2020     Lab Results   Component Value Date    ALT 59 10/24/2020    AST 27 10/24/2020    ALKPHOS 56 10/24/2020     Lab Results   Component Value Date    INR 1 05 10/27/2020    INR 1 14 10/24/2020    PROTIME 13 9 10/27/2020    PROTIME 14 1 10/24/2020           Answers for HPI/ROS submitted by the patient on 6/20/2021  Chronicity: recurrent  Onset: more than 1 year ago  Onset quality: sudden  Frequency: daily  Episode duration: 2 hours  Progression since onset: gradually worsening  Pain location: periumbilical region, suprapubic region  Pain - numeric: 7/10  Pain quality: burning, cramping  Radiates to: periumbilical region, suprapubic region, perineum  anorexia: No  arthralgias: Yes  belching: No  constipation: No  diarrhea: Yes  dysuria: No  fever: No  flatus: Yes  frequency: Yes  headaches: No  hematochezia: No  hematuria: No  melena: No  myalgias: No  nausea:  No  weight loss: Yes  vomiting: No  Aggravated by: eating  Relieved by: bowel movements

## 2021-06-23 LAB
C DIFF TOX GENS STL QL NAA+PROBE: NEGATIVE
G LAMBLIA AG STL QL IA: NEGATIVE

## 2021-06-24 ENCOUNTER — TELEPHONE (OUTPATIENT)
Dept: GASTROENTEROLOGY | Facility: CLINIC | Age: 61
End: 2021-06-24

## 2021-06-24 NOTE — TELEPHONE ENCOUNTER
----- Message from Wolf Manning MD sent at 6/23/2021  8:46 PM EDT -----  Please tell him all the stool tests were negative and we will proceed to the procedures

## 2021-06-25 LAB — O+P STL CONC: NORMAL

## 2021-06-30 ENCOUNTER — TELEPHONE (OUTPATIENT)
Dept: GASTROENTEROLOGY | Facility: HOSPITAL | Age: 61
End: 2021-06-30

## 2021-07-01 ENCOUNTER — TELEPHONE (OUTPATIENT)
Dept: GASTROENTEROLOGY | Facility: CLINIC | Age: 61
End: 2021-07-01

## 2021-07-01 ENCOUNTER — HOSPITAL ENCOUNTER (OUTPATIENT)
Dept: GASTROENTEROLOGY | Facility: HOSPITAL | Age: 61
Setting detail: OUTPATIENT SURGERY
Discharge: HOME | End: 2021-07-01
Attending: INTERNAL MEDICINE | Admitting: INTERNAL MEDICINE
Payer: COMMERCIAL

## 2021-07-01 ENCOUNTER — ANESTHESIA (OUTPATIENT)
Dept: GASTROENTEROLOGY | Facility: HOSPITAL | Age: 61
End: 2021-07-01

## 2021-07-01 ENCOUNTER — ANESTHESIA EVENT (OUTPATIENT)
Dept: GASTROENTEROLOGY | Facility: HOSPITAL | Age: 61
End: 2021-07-01

## 2021-07-01 VITALS
RESPIRATION RATE: 16 BRPM | HEART RATE: 122 BPM | OXYGEN SATURATION: 96 % | HEIGHT: 68 IN | TEMPERATURE: 97.5 F | WEIGHT: 211.42 LBS | DIASTOLIC BLOOD PRESSURE: 98 MMHG | SYSTOLIC BLOOD PRESSURE: 117 MMHG | BODY MASS INDEX: 32.04 KG/M2

## 2021-07-01 DIAGNOSIS — K58.0 IRRITABLE BOWEL SYNDROME WITH DIARRHEA: Primary | ICD-10-CM

## 2021-07-01 DIAGNOSIS — K59.1 FUNCTIONAL DIARRHEA: ICD-10-CM

## 2021-07-01 DIAGNOSIS — R10.30 LOWER ABDOMINAL PAIN: ICD-10-CM

## 2021-07-01 DIAGNOSIS — F10.10 ALCOHOL ABUSE: ICD-10-CM

## 2021-07-01 LAB — GLUCOSE SERPL-MCNC: 122 MG/DL (ref 65–140)

## 2021-07-01 PROCEDURE — 82948 REAGENT STRIP/BLOOD GLUCOSE: CPT

## 2021-07-01 PROCEDURE — 45385 COLONOSCOPY W/LESION REMOVAL: CPT | Performed by: INTERNAL MEDICINE

## 2021-07-01 PROCEDURE — 43239 EGD BIOPSY SINGLE/MULTIPLE: CPT | Performed by: INTERNAL MEDICINE

## 2021-07-01 PROCEDURE — 45380 COLONOSCOPY AND BIOPSY: CPT | Performed by: INTERNAL MEDICINE

## 2021-07-01 PROCEDURE — 88305 TISSUE EXAM BY PATHOLOGIST: CPT | Performed by: PATHOLOGY

## 2021-07-01 RX ORDER — PROPOFOL 10 MG/ML
INJECTION, EMULSION INTRAVENOUS AS NEEDED
Status: DISCONTINUED | OUTPATIENT
Start: 2021-07-01 | End: 2021-07-01

## 2021-07-01 RX ORDER — LIDOCAINE HYDROCHLORIDE 20 MG/ML
INJECTION, SOLUTION EPIDURAL; INFILTRATION; INTRACAUDAL; PERINEURAL AS NEEDED
Status: DISCONTINUED | OUTPATIENT
Start: 2021-07-01 | End: 2021-07-01

## 2021-07-01 RX ORDER — SODIUM CHLORIDE, SODIUM LACTATE, POTASSIUM CHLORIDE, CALCIUM CHLORIDE 600; 310; 30; 20 MG/100ML; MG/100ML; MG/100ML; MG/100ML
125 INJECTION, SOLUTION INTRAVENOUS CONTINUOUS
Status: DISCONTINUED | OUTPATIENT
Start: 2021-07-01 | End: 2021-07-05 | Stop reason: HOSPADM

## 2021-07-01 RX ORDER — PANTOPRAZOLE SODIUM 40 MG/1
40 TABLET, DELAYED RELEASE ORAL DAILY
Qty: 30 TABLET | Refills: 2 | Status: SHIPPED | OUTPATIENT
Start: 2021-07-01 | End: 2021-10-21 | Stop reason: HOSPADM

## 2021-07-01 RX ADMIN — PROPOFOL 20 MG: 10 INJECTION, EMULSION INTRAVENOUS at 09:54

## 2021-07-01 RX ADMIN — PROPOFOL 20 MG: 10 INJECTION, EMULSION INTRAVENOUS at 09:43

## 2021-07-01 RX ADMIN — SODIUM CHLORIDE, SODIUM LACTATE, POTASSIUM CHLORIDE, AND CALCIUM CHLORIDE: .6; .31; .03; .02 INJECTION, SOLUTION INTRAVENOUS at 09:30

## 2021-07-01 RX ADMIN — PROPOFOL 20 MG: 10 INJECTION, EMULSION INTRAVENOUS at 09:57

## 2021-07-01 RX ADMIN — PROPOFOL 20 MG: 10 INJECTION, EMULSION INTRAVENOUS at 09:49

## 2021-07-01 RX ADMIN — PROPOFOL 20 MG: 10 INJECTION, EMULSION INTRAVENOUS at 09:46

## 2021-07-01 RX ADMIN — LIDOCAINE HYDROCHLORIDE 100 MG: 20 INJECTION, SOLUTION EPIDURAL; INFILTRATION; INTRACAUDAL; PERINEURAL at 09:40

## 2021-07-01 RX ADMIN — PROPOFOL 20 MG: 10 INJECTION, EMULSION INTRAVENOUS at 09:52

## 2021-07-01 RX ADMIN — PROPOFOL 120 MG: 10 INJECTION, EMULSION INTRAVENOUS at 09:41

## 2021-07-01 NOTE — ANESTHESIA PREPROCEDURE EVALUATION
Procedure:  EGD  COLONOSCOPY    Relevant Problems   CARDIO   (+) Atrial fibrillation (HCC)   (+) Coronary artery disease   (+) Persistent atrial fibrillation (HCC)      ENDO   (+) Type 2 diabetes mellitus, without long-term current use of insulin (HCC)      MUSCULOSKELETAL   (+) Lower back pain      NEURO/PSYCH   (+) Chronic pain syndrome      PULMONARY   (+) Sleep apnea       Epidural lipomatosis   Lower back pain   Lumbar radiculopathy   BMI 39 0-39 9,adult   Chronic pain syndrome   Lumbar stenosis with neurogenic claudication   Spondylolisthesis of lumbar region   Spinal instability, lumbar   PAD (peripheral artery disease) (HCC)   Sleep apnea   Persistent atrial fibrillation (HCC)   Dry cough   Alcohol abuse   Type 2 diabetes mellitus, without long-term current use of insulin (HCC)   Atrial fibrillation (HCC)   Cardiomyopathy ([address] )   Coronary artery disease   Asymptomatic stenosis of left carotid artery   Status post insertion of drug-eluting stent into left anterior descending (LAD) artery   Functional diarrhea   Lower abdominal pain          Physical Exam    Airway    Mallampati score: III  TM Distance: >3 FB  Neck ROM: full     Dental       Cardiovascular  Cardiovascular exam normal    Pulmonary  Pulmonary exam normal     Other Findings     He is a 41-year-old male with severe bilateral lower quadrant abdominal pain explosive watery stools 3 times a day since starting Ozempic a few months ago      1 will do EGD and colonoscopy to investigate     2 will do stool testing     Chief Complaint:  Abdominal pain and diarrhea     HPI:  He is a 41-year-old male with explosive stools and diarrhea and abdominal pain  He reports that since starting Ozempic for his diarrhea he has had postprandial diarrhea  He reports he goes 2 to 3 times a day it is loose it is brown it is water and explosive he has pain prior to each bowel movement    He reports there is severe urgency and 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 he has no melena or hematochezia  He does travel and within the [employer]  for work in the last year  He has had no antibiotics in last 3-6 months  He does not drink from a well  Nobody in the family has ulcerative colitis or Crohn's disease  He reports he had a colonoscopy about fiber 6 years ago that was normal   He really does not have upper abdominal symptoms  He has no heartburn regurgitation no dysphagia  He has rare nausea no vomiting  He has lost weight trying to be healthier  He had heart stents put in in November of last year and is on Brilinta  He also has atrial fibrillation and is on Eliquis  Anesthesia Plan  ASA Score- 3     Anesthesia Type- IV sedation with anesthesia with ASA Monitors  Additional Monitors:   Airway Plan:           Plan Factors-    Chart reviewed  EKG reviewed  Existing labs reviewed  Patient summary reviewed  Induction- intravenous  Postoperative Plan-     Informed Consent- Anesthetic plan and risks discussed with patient  I personally reviewed this patient with the CRNA  Discussed and agreed on the Anesthesia Plan with the PATRICIO Castaneda

## 2021-07-01 NOTE — ANESTHESIA POSTPROCEDURE EVALUATION
Post-Op Assessment Note    CV Status:  Stable  Pain Score: 0    Pain management: adequate     Mental Status:  Sleepy   Hydration Status:  Euvolemic   PONV Controlled:  Controlled   Airway Patency:  Patent      Post Op Vitals Reviewed: Yes      Staff: CRNA         No complications documented      BP  110/86    Temp      Pulse 98   Resp 18   SpO2 96% RA

## 2021-07-01 NOTE — TELEPHONE ENCOUNTER
Tried to submit a request for prior authorization through cover my meds  Message came up unable to retrieve clinical questions to contact pateint's plan  Tried to contact [address] stated they do not do prior authorization for this plan transferred me to [facility]   [phone]  Spoke with Jonathan Stephens gave clinicals and tried and failed information verbally    Case # Genaro Feeling [id number]  Could take up two business days  Patient was advised

## 2021-07-06 ENCOUNTER — TELEPHONE (OUTPATIENT)
Dept: GASTROENTEROLOGY | Facility: CLINIC | Age: 61
End: 2021-07-06

## 2021-07-06 NOTE — TELEPHONE ENCOUNTER
Received fax from [facility] was approved valid from 5/6/21 until 7/20/21 approval faxed to pharmacy

## 2021-07-06 NOTE — TELEPHONE ENCOUNTER
----- Message from Wan Styles MD sent at 7/3/2021  7:24 AM EDT -----  Please tell him that all of the biopsies were negative  Please tell him the polyp was precancerous and have another colonoscopy in 5 years      Please make sure that he has a follow-up with a physician assistant in the next 4-8 weeks

## 2021-07-06 NOTE — TELEPHONE ENCOUNTER
Called Patient   David Herrera Gave patient test results  Set up a follow up appointment with Eliel Ling on 9/9/2021 @ 10:30 am  Patient also was inquiring about medication that was denied by ins company  Told patient would call him back regarding this matter

## 2021-07-06 NOTE — TELEPHONE ENCOUNTER
----- Message from Brii Wilson MD sent at 7/3/2021  7:24 AM EDT -----  Please tell him that all of the biopsies were negative  Please tell him the polyp was precancerous and have another colonoscopy in 5 years      Please make sure that he has a follow-up with a physician assistant in the next 4-8 weeks

## 2021-07-06 NOTE — TELEPHONE ENCOUNTER
Patient was inquiring about medication that was denied by insurance co  Routed message to Krystian Carpenter as she was handling that

## 2021-07-07 ENCOUNTER — TELEPHONE (OUTPATIENT)
Dept: GASTROENTEROLOGY | Facility: CLINIC | Age: 61
End: 2021-07-07

## 2021-07-15 NOTE — TELEPHONE ENCOUNTER
Spoke with Kostas Salguero at [facility] my Meds advised Carmen Duke was approved by [address] [place] [address] Road

## 2021-07-15 NOTE — TELEPHONE ENCOUNTER
Dr Lesley Gudino from [address] [place] [address] My Meds called to check on Xifaxan 550 mg for 14 days   Please call [phone]  Ref [facility] [id number]  ty

## 2021-07-19 ENCOUNTER — TELEPHONE (OUTPATIENT)
Dept: GASTROENTEROLOGY | Facility: CLINIC | Age: 61
End: 2021-07-19

## 2021-07-19 NOTE — TELEPHONE ENCOUNTER
----- Message from Sharyn Cifuentes sent at 7/19/2021 11:00 AM EDT -----  Regarding: Non-Urgent Medical Question  Contact: [phone]  Hi Dr Jannie Flores,    I just wanted to send you a message of appreciation to you and to let you know that I feel 1000% better since  I had a consult and then the procedure  It truly is amazing how much better I feel and I thank you      Sincerely,     Sharyn Cifuentes

## 2021-08-04 ENCOUNTER — TELEPHONE (OUTPATIENT)
Dept: GASTROENTEROLOGY | Facility: CLINIC | Age: 61
End: 2021-08-04

## 2021-09-08 RX ORDER — EMPAGLIFLOZIN AND METFORMIN HYDROCHLORIDE 12.5; 1 MG/1; MG/1
1 TABLET ORAL
COMMUNITY
Start: 2021-07-21

## 2021-09-08 RX ORDER — LISINOPRIL 10 MG/1
10 TABLET ORAL DAILY
COMMUNITY
Start: 2021-06-28 | End: 2022-06-28

## 2021-09-08 RX ORDER — EMPAGLIFLOZIN, METFORMIN HYDROCHLORIDE 25; 1000 MG/1; MG/1
TABLET, EXTENDED RELEASE ORAL
COMMUNITY
Start: 2021-06-28 | End: 2021-10-21 | Stop reason: HOSPADM

## 2021-09-08 RX ORDER — FUROSEMIDE 40 MG/1
20 TABLET ORAL DAILY
COMMUNITY
Start: 2021-06-28 | End: 2021-10-21 | Stop reason: SDUPTHER

## 2021-09-09 ENCOUNTER — OFFICE VISIT (OUTPATIENT)
Dept: GASTROENTEROLOGY | Facility: CLINIC | Age: 61
End: 2021-09-09
Payer: COMMERCIAL

## 2021-09-09 VITALS
WEIGHT: 209.2 LBS | BODY MASS INDEX: 31.71 KG/M2 | SYSTOLIC BLOOD PRESSURE: 132 MMHG | HEART RATE: 83 BPM | HEIGHT: 68 IN | DIASTOLIC BLOOD PRESSURE: 82 MMHG

## 2021-09-09 DIAGNOSIS — K59.1 FUNCTIONAL DIARRHEA: Primary | ICD-10-CM

## 2021-09-09 DIAGNOSIS — K29.70 GASTRITIS WITHOUT BLEEDING, UNSPECIFIED CHRONICITY, UNSPECIFIED GASTRITIS TYPE: ICD-10-CM

## 2021-09-09 PROCEDURE — 99214 OFFICE O/P EST MOD 30 MIN: CPT | Performed by: PHYSICIAN ASSISTANT

## 2021-09-09 RX ORDER — BLOOD-GLUCOSE METER
KIT MISCELLANEOUS
COMMUNITY
Start: 2021-08-23

## 2021-09-09 NOTE — PROGRESS NOTES
[facility]  Michaela Handley 57 y o  male MRN: [id number]       CC: EGD/colonoscopy follow-up    HPI: Agapito Jauregui is a 27-year-old male with history of type 2 diabetes, hypertension, on atrial fibrillation, CAD on Brilinta, and lumbar radiculopathy  Patient is here to follow-up after he had EGD and colonoscopy this year for chronic diarrhea  EGD revealing mild gastritis  Colonoscopy the single polyp removed, diverticulosis and internal hemorrhoid  No microscopic colitis on pathology  Patient was recommended Xifaxan course  Patient reports that his symptoms are much improved  Previously, he was having 3-4 bowel movements per day, and is now having about 1 bowel movement per day  Sometimes, the patient feels as though he does not fully evacuate  He denies any pyrosis or regurgitation  Review of Systems:    CONSTITUTIONAL: Denies any fever, chills, or rigors  Good appetite, and no recent weight loss  HEENT: No earache or tinnitus  Denies hearing loss or visual disturbances  CARDIOVASCULAR: No chest pain or palpitations  RESPIRATORY: Denies any cough, hemoptysis, shortness of breath or dyspnea on exertion  GASTROINTESTINAL: As noted in the History of Present Illness  GENITOURINARY: No problems with urination  Denies any hematuria or dysuria  NEUROLOGIC: No dizziness or vertigo, denies headaches  MUSCULOSKELETAL: Denies any muscle or joint pain  SKIN: Denies skin rashes or itching  ENDOCRINE: Denies excessive thirst  Denies intolerance to heat or cold  PSYCHOSOCIAL: Denies depression or anxiety  Denies any recent memory loss         Current Outpatient Medications   Medication Sig Dispense Refill    acetaminophen (TYLENOL) 325 mg tablet Take 650 mg by mouth every 6 (six) hours as needed for mild pain       apixaban (Eliquis) 5 mg Take 5 mg by mouth 2 (two) times a day       atorvastatin (LIPITOR) 80 mg tablet Take 1 tablet (80 mg total) by mouth daily at bedtime 30 tablet 0    DULoxetine (Cymbalta) 60 mg delayed release capsule Take 60 mg by mouth daily       Empagliflozin-metFORMIN HCl (Synjardy) 12 5-1000 MG TABS Take 1 tablet by mouth      Empagliflozin-metFORMIN HCl ER  MG TB24 Take 1 tablet by mouth      fenofibrate (TRICOR) 54 MG tablet       FREESTYLE LITE test strip       furosemide (LASIX) 20 mg tablet Take 1 tablet (20 mg total) by mouth daily 30 tablet 6    lisinopril (ZESTRIL) 10 mg tablet Take 10 mg by mouth daily      lisinopril (ZESTRIL) 20 mg tablet Take 20 mg by mouth daily       loratadine (CLARITIN) 10 mg tablet Take 10 mg by mouth daily       metFORMIN (GLUCOPHAGE) 500 mg tablet Take 500 mg by mouth 2 (two) times a day with meals      metoprolol succinate (TOPROL-XL) 100 mg 24 hr tablet Take 1 tablet (100 mg total) by mouth daily 60 tablet 0    omega-3-acid ethyl esters (LOVAZA) 1 g capsule Take 2 g by mouth 2 (two) times a day       Ozempic, 1 MG/DOSE, 4 MG/3ML SOPN injection pen       pantoprazole (PROTONIX) 40 mg tablet Take 1 tablet (40 mg total) by mouth daily 30 tablet 2    Synjardy XR  MG TB24       ticagrelor (BRILINTA) 90 MG Take 1 tablet (90 mg total) by mouth every 12 (twelve) hours 60 tablet 0    aspirin (ECOTRIN LOW STRENGTH) 81 mg EC tablet Take 81 mg by mouth daily  (Patient not taking: Reported on 9/9/2021)      furosemide (LASIX) 40 mg tablet Take 20 mg by mouth daily (Patient not taking: Reported on 9/9/2021)      gabapentin (NEURONTIN) 600 MG tablet gabapentin 600 mg tablet (Patient not taking: Reported on 6/22/2021)      glimepiride (AMARYL) 4 mg tablet Take 4 mg by mouth 2 (two) times a day  (Patient not taking: Reported on 9/9/2021)      sildenafil (REVATIO) 20 mg tablet Take 20 mg by mouth  (Patient not taking: Reported on 9/9/2021)       No current facility-administered medications for this visit       Past Medical History:   Diagnosis Date    Atrial fibrillation (HCC)     Back pain     CPAP (continuous positive airway pressure) dependence     [facility]     Hyperlipidemia     Hypertension     [facility]     Persistent atrial fibrillation ([facility]     Sleep apnea     Spinal stenosis      Past Surgical History:   Procedure Laterality Date    CORONARY ANGIOPLASTY WITH STENT PLACEMENT      IR ABDOMINAL AORTAGRAM  12/1/2020     Social History     Socioeconomic History    Marital status: /Civil Union     Spouse name: None    Number of children: None    Years of education: None    Highest education level: None   Occupational History    None   Tobacco Use    Smoking status: Former Smoker    Smokeless tobacco: Never Used   Vaping Use    Vaping Use: Never used   Substance and Sexual Activity    Alcohol use: Yes     Comment: Social     Drug use: Never    Sexual activity: Yes     Partners: Female   Other Topics Concern    None   Social History Narrative    None     Social Determinants of Health     Financial Resource Strain:     Difficulty of Paying Living Expenses:    Food Insecurity:     Worried About Running Out of Food in the Last Year:     Ran Out of Food in the Last Year:    Transportation Needs:     Lack of Transportation (Medical):      Lack of Transportation (Non-Medical):    Physical Activity:     Days of Exercise per Week:     Minutes of Exercise per Session:    Stress:     Feeling of Stress :    Social Connections:     Frequency of Communication with Friends and Family:     Frequency of Social Gatherings with Friends and Family:     Attends [employer] Services:     Active Member of Clubs or Organizations:     Attends Club or Organization Meetings:     Marital Status:    Intimate Partner Violence:     Fear of Current or Ex-Partner:     Emotionally Abused:     Physically Abused:     Sexually Abused:      Family History   Problem Relation Age of Onset    Diabetes Mother     Alzheimer's disease Mother     Cancer Sister     Aneurysm Father             PHYSICAL EXAM:    Vitals:    09/09/21 1034   BP: 132/82   Pulse: 83   Weight: 94 9 kg (209 lb 3 2 oz)   Height: 5' 8" (1 727 m)     General Appearance:   Alert and oriented x 3  Cooperative, and in no respiratory distress   HEENT:   Normocephalic, atraumatic, anicteric      Neck:  Supple, symmetrical, trachea midline   Lungs:   Clear to auscultation bilaterally    Heart[de-identified]   Regular rate and rhythm   Abdomen:   Soft, non-tender, non-distended; normal bowel sounds; no masses, no organomegaly    Genitalia:   Deferred    Rectal:   Deferred    Extremities:  No cyanosis, clubbing or edema    Pulses:  2+ and symmetric all extremities    Skin:  Skin color, texture, turgor normal, no rashes or lesions    Lymph nodes:  No palpable cervical or supraclavicular lymphadenopathy        Lab Results:             Invalid input(s): LABALBU            Imaging Studies: I have personally reviewed pertinent imaging studies  EGD    Result Date: 7/1/2021  Impression: Irregular Z-line Mild erosive gastritis Fissuring of the small-bowel folds RECOMMENDATION: PPI course Await pathology Reflux precautions and weight loss  Wan Styles MD     Colonoscopy    Addendum Date: 7/1/2021     [address] [place] [address] [phone] DATE OF SERVICE: 7/01/21 PHYSICIAN(S): Wan Styles MD - Attending Physician INDICATION: Functional diarrhea, Lower abdominal pain Colonoscopy performed for a diagnostic indication  POST-OP DIAGNOSIS: See the impression below  HISTORY: Prior colonoscopy: No prior colonoscopy  BOWEL PREPARATION: Biscodyl tablets;Magnesium/Sodium Sulfate (Miralax, Suprep) PREPROCEDURE: Informed consent was obtained for the procedure, including sedation  Risks including but not limited to bleeding, infection, perforation, adverse drug reaction and aspiration were explained in detail   Also explained about less than 100% sensitivity with the exam and other alternatives  The patient was placed in the left lateral decubitus position  DETAILS OF PROCEDURE: Patient was taken to the procedure room where a time out was performed to confirm correct patient and correct procedure  The patient underwent monitored anesthesia care, which was administered by an anesthesia professional  The patient's blood pressure, heart rate, level of consciousness, oxygen and respirations were monitored throughout the procedure  A digital rectal exam was performed  The scope was introduced through the anus and advanced to the cecum  Retroflexion was performed in the rectum  The quality of bowel preparation was evaluated using the Ramon Bowel Preparation Scale with scores of: right colon = 2, transverse colon = 2, left colon = 2  The total BBPS score was 6  Bowel prep was adequate  The patient experienced no blood loss  The procedure was not difficult  The patient tolerated the procedure well  There were no apparent complications  ANESTHESIA INFORMATION: ASA: III Anesthesia Type: IV Sedation with Anesthesia MEDICATIONS: No administrations occurring from 0937 to 1002 on 07/01/21 FINDINGS: Multiple small, mild scattered diverticula with no bleeding in the descending colon and sigmoid colon All observed locations appeared normal, including the cecum, ascending colon, hepatic flexure, transverse colon, splenic flexure, rectosigmoid and rectum  Performed random biopsy   Internal hemorrhoids with no bleeding One sessile, adenomatous-appearing polyp measuring 5-9 mm in the splenic flexure; completely removed en bloc by cold snare and retrieved specimen EVENTS: Procedure Events Event Event Time ENDO SCOPE OUT TIME 7/1/2021 10:00 AM SPECIMENS: ID Type Source Tests Collected by Time Destination 1 :  Tissue Duodenum TISSUE EXAM Bianca David MD 7/1/2021  9:45 AM  2 :  Tissue Stomach TISSUE EXAM Bianca David MD 7/1/2021  9:46 AM  3 : distal Tissue Esophagus TISSUE EXAM Marlyn Tang III, MD 7/1/2021  9:47 AM  4 : random Tissue Colon TISSUE EXAM Lebron Mcdonnell MD 7/1/2021 10:00 AM  5 : splenic flexure Tissue Polyp, Colorectal TISSUE EXAM Lebron Mcdonnell MD 7/1/2021 10:01 AM  EQUIPMENT: [id number] IMPRESSION: One polyp status post cold snare polypectomy Normal colon mucosa Left-sided diverticulosis Internal hemorrhoid RECOMMENDATION: Await pathology Continue align probiotic and Benefiber supplement Xifaxan course Repeat colonoscopy in 5 years due to a personal history of colon polyps  Lebron Mcdonnell MD     Result Date: 7/1/2021  Impression: One polyp status post cold snare polypectomy Normal colon mucosa Left-sided diverticulosis Internal hemorrhoid RECOMMENDATION: Await pathology Continue align probiotic and Benefiber supplement Xifaxan course Repeat colonoscopy in 5 years due to a personal history of colon polyps  Lebron Mcdonnell MD       ASSESSMENT and PLAN:      1) Irritable bowel syndrome, diarrhea predominant -  Patient is much improved after Xifaxan course  I told him that we could use this in the future at some point again if he needs it  In addition, we could consider colestipol or Questran if his diarrhea becomes more consistent  Otherwise, I did advise him to purchase fiber supplementation such as Benefiber for incomplete BMs  2) Gastritis -  Patient reports that he did not have any pyrosis or regurgitation  I told him to complete PPI course, then stop  Follow up as needed

## 2021-09-13 ENCOUNTER — OFFICE VISIT (OUTPATIENT)
Dept: CARDIOLOGY CLINIC | Facility: CLINIC | Age: 61
End: 2021-09-13
Payer: COMMERCIAL

## 2021-09-13 VITALS
WEIGHT: 208.8 LBS | SYSTOLIC BLOOD PRESSURE: 132 MMHG | BODY MASS INDEX: 31.64 KG/M2 | HEIGHT: 68 IN | DIASTOLIC BLOOD PRESSURE: 80 MMHG | HEART RATE: 79 BPM | OXYGEN SATURATION: 98 %

## 2021-09-13 DIAGNOSIS — I25.10 CORONARY ARTERY DISEASE INVOLVING NATIVE CORONARY ARTERY OF NATIVE HEART WITHOUT ANGINA PECTORIS: ICD-10-CM

## 2021-09-13 DIAGNOSIS — Z95.5 STATUS POST INSERTION OF DRUG-ELUTING STENT INTO LEFT ANTERIOR DESCENDING (LAD) ARTERY: ICD-10-CM

## 2021-09-13 DIAGNOSIS — I48.19 PERSISTENT ATRIAL FIBRILLATION (HCC): ICD-10-CM

## 2021-09-13 DIAGNOSIS — I48.11 LONGSTANDING PERSISTENT ATRIAL FIBRILLATION (HCC): Primary | ICD-10-CM

## 2021-09-13 DIAGNOSIS — I25.5 ISCHEMIC CARDIOMYOPATHY: ICD-10-CM

## 2021-09-13 DIAGNOSIS — I73.9 PAD (PERIPHERAL ARTERY DISEASE) (HCC): ICD-10-CM

## 2021-09-13 DIAGNOSIS — G47.33 OBSTRUCTIVE SLEEP APNEA SYNDROME: ICD-10-CM

## 2021-09-13 PROCEDURE — 93000 ELECTROCARDIOGRAM COMPLETE: CPT | Performed by: INTERNAL MEDICINE

## 2021-09-13 PROCEDURE — 99214 OFFICE O/P EST MOD 30 MIN: CPT | Performed by: INTERNAL MEDICINE

## 2021-09-13 NOTE — PROGRESS NOTES
Cardiology Follow Up    Elena Yun  1960  [address] [facility] [place]  [facility]  [address]  [phone]    1  Longstanding persistent atrial fibrillation (HCC)  POCT ECG   2  Persistent atrial fibrillation (HCC)  CARDIOVERSION (NON INVASIVE ONLY)   3  Ischemic cardiomyopathy     4  Coronary artery disease involving native coronary artery of native heart without angina pectoris     5  PAD (peripheral artery disease) ([address] )     6  Obstructive sleep apnea syndrome     7  Status post insertion of drug-eluting stent into left anterior descending (LAD) artery         Discussion/Summary:    1  CAD - Mary Mcneal  Was found to have significant left main and LAD disease back in October  After consultation with CT surgery and Interventional Cardiology, PCI was performed of the distal left main and proximal to mid LAD  This went well without complications  He is without symptoms of angina  At our last visit I drop the aspirin to avoid triple therapy  At our next visit we could consider switching back to aspirin from Brilinta, or at least dropping the Brilinta dose  2   Persistent atrial fibrillation - By records it appears that he has been in this over the last couple years, and has been rate controlled  At our last visit we discontinue diltiazem  His heart rates are well controlled  He is asymptomatic  We discussed both a rate and rhythm control strategy  Given some history of congestive heart failure, and is ischemic cardiomyopathy, I recommended a try at rhythm control  We will set him up for a cardioversion in the near future  He remains on the same dose of Toprol and is on Eliquis for stroke prevention  3   Ischemic cardiomyopathy - His EF was mildly reduced to 45%  Because of his shortness of breath Lasix was added to his regimen at his hospitalization    He shows no signs of CHF and is active without limitations  At our last visit I decreased the dose of Lasix to 20 mg daily  No changes were made today  He should follow a low-sodium diet  4   Hyperlipidemia - He will remain on high-intensity statin therapy  We will continue to follow blood work closely  Interval History:     Mr Yolis Barraza comes in for follow-up given his history of atrial fibrillation and CAD  He has had persistent atrial fibrillation from what sounds like for the last few years which was controlled on Toprol XL and he has been on Eliquis for stroke prevention  I met him during hospitalization in October which he presented with symptoms of worsening shortness of breath and palpitations, but also had reported an episode of chest pressure [place] the airport a [place] before admission  On admission he was found to be in rapid atrial fibrillation  Echocardiogram showed a mildly reduced ejection fraction of 45% and extensive coronary calcifications were noted on CT  Because of these findings and his symptoms, he underwent a cardiac catheterization which showed significant left main and LAD disease  He was transferred down from the [facility] to [place] for a CT surgical evaluation  In collaboration with CT surgery and Interventional Cardiology, it was felt the best option was PCI to the distal left main / LAD  Later that hospital admission he had drug-eluting stent placement x2 to the LAD and 1 to the left main  This went well without complications  He was discharged on dual anti-platelet therapy with Brilinta, and remained on Eliquis for stroke prevention  His atrial fibrillation came under control with up titration of his Toprol and adding diltiazem  At our last visit we dropped the aspirin  His heart rates were running low and we also discontinue diltiazem  His heart rates are now under good control  [facility] has felt well since last visit  With discontinuing his diltiazem his lightheadedness went away  He denies any near-syncope or syncope  No palpitations  He denies chest pain or any symptoms of angina  He is euvolemic and shows no signs or symptoms of CHF  He denies shortness of breath        Patient Active Problem List   Diagnosis    Epidural lipomatosis    Lower back pain    Lumbar radiculopathy    BMI 39 0-39 9,adult    Chronic pain syndrome    Lumbar stenosis with neurogenic claudication    Spondylolisthesis of lumbar region    Spinal instability, lumbar    PAD (peripheral artery disease) ([facility])    Sleep apnea    Persistent atrial fibrillation ([facility])    Dry cough    Alcohol abuse    Type 2 diabetes mellitus, without long-term current use of insulin ([facility])    Atrial fibrillation ([facility])    Cardiomyopathy ([place] [address] )    Coronary artery disease    Asymptomatic stenosis of left carotid artery    Status post insertion of drug-eluting stent into left anterior descending (LAD) artery    Functional diarrhea    Lower abdominal pain     Past Medical History:   Diagnosis Date    Atrial fibrillation ([facility])     Back pain     CPAP (continuous positive airway pressure) dependence     Diabetes mellitus ([place] [address] )     Hyperlipidemia     Hypertension     PAD (peripheral artery disease) ([facility])     Persistent atrial fibrillation ([facility])     Sleep apnea     Spinal stenosis      Social History     Socioeconomic History    Marital status: /Civil Union     Spouse name: Not on file    Number of children: Not on file    Years of education: Not on file    Highest education level: Not on file   Occupational History    Not on file   Tobacco Use    Smoking status: Former Smoker    Smokeless tobacco: Never Used   Vaping Use    Vaping Use: Never used   Substance and Sexual Activity    Alcohol use: Yes     Comment: Social     Drug use: Never    Sexual activity: Yes     Partners: Female   Other Topics Concern    Not on file   Social History Narrative    Not on file     Social Determinants of Health Financial Resource Strain:     Difficulty of Paying Living Expenses:    Food Insecurity:     Worried About Running Out of Food in the Last Year:     [address] in the Last Year:    Transportation Needs:     Lack of Transportation (Medical):      Lack of Transportation (Non-Medical):    Physical Activity:     Days of Exercise per Week:     Minutes of Exercise per Session:    Stress:     Feeling of Stress :    Social Connections:     Frequency of Communication with Friends and Family:     Frequency of Social Gatherings with Friends and Family:     Attends Zoroastrianism Services:     Active Member of Clubs or Organizations:     Attends Club or Organization Meetings:     Marital Status:    Intimate Partner Violence:     Fear of Current or Ex-Partner:     Emotionally Abused:     Physically Abused:     Sexually Abused:       Family History   Problem Relation Age of Onset    Diabetes Mother     Alzheimer's disease Mother     Cancer Sister     Aneurysm Father      Past Surgical History:   Procedure Laterality Date    CORONARY ANGIOPLASTY WITH STENT PLACEMENT      IR ABDOMINAL AORTAGRAM  12/1/2020       Current Outpatient Medications:     acetaminophen (TYLENOL) 325 mg tablet, Take 650 mg by mouth every 6 (six) hours as needed for mild pain , Disp: , Rfl:     apixaban (Eliquis) 5 mg, Take 5 mg by mouth 2 (two) times a day , Disp: , Rfl:     atorvastatin (LIPITOR) 80 mg tablet, Take 1 tablet (80 mg total) by mouth daily at bedtime, Disp: 30 tablet, Rfl: 0    DULoxetine (Cymbalta) 60 mg delayed release capsule, Take 60 mg by mouth daily , Disp: , Rfl:     Empagliflozin-metFORMIN HCl (Synjardy) 12 5-1000 MG TABS, Take 1 tablet by mouth, Disp: , Rfl:     fenofibrate (TRICOR) 54 MG tablet, , Disp: , Rfl:     FREESTYLE LITE test strip, , Disp: , Rfl:     furosemide (LASIX) 20 mg tablet, Take 1 tablet (20 mg total) by mouth daily, Disp: 30 tablet, Rfl: 6    lisinopril (ZESTRIL) 10 mg tablet, Take 10 mg by mouth daily, Disp: , Rfl:     loratadine (CLARITIN) 10 mg tablet, Take 10 mg by mouth daily , Disp: , Rfl:     metoprolol succinate (TOPROL-XL) 100 mg 24 hr tablet, Take 1 tablet (100 mg total) by mouth daily, Disp: 60 tablet, Rfl: 0    omega-3-acid ethyl esters (LOVAZA) 1 g capsule, Take 2 g by mouth 2 (two) times a day , Disp: , Rfl:     Ozempic, 1 MG/DOSE, 4 MG/3ML SOPN injection pen, , Disp: , Rfl:     pantoprazole (PROTONIX) 40 mg tablet, Take 1 tablet (40 mg total) by mouth daily, Disp: 30 tablet, Rfl: 2    ticagrelor (BRILINTA) 90 MG, Take 1 tablet (90 mg total) by mouth every 12 (twelve) hours, Disp: 60 tablet, Rfl: 0    aspirin (ECOTRIN LOW STRENGTH) 81 mg EC tablet, Take 81 mg by mouth daily  (Patient not taking: Reported on 9/9/2021), Disp: , Rfl:     Empagliflozin-metFORMIN HCl ER  MG TB24, Take 1 tablet by mouth (Patient not taking: Reported on 9/13/2021), Disp: , Rfl:     furosemide (LASIX) 40 mg tablet, Take 20 mg by mouth daily (Patient not taking: Reported on 9/9/2021), Disp: , Rfl:     gabapentin (NEURONTIN) 600 MG tablet, gabapentin 600 mg tablet (Patient not taking: Reported on 6/22/2021), Disp: , Rfl:     glimepiride (AMARYL) 4 mg tablet, Take 4 mg by mouth 2 (two) times a day  (Patient not taking: Reported on 9/9/2021), Disp: , Rfl:     lisinopril (ZESTRIL) 20 mg tablet, Take 20 mg by mouth daily  (Patient not taking: Reported on 9/13/2021), Disp: , Rfl:     metFORMIN (GLUCOPHAGE) 500 mg tablet, Take 500 mg by mouth 2 (two) times a day with meals (Patient not taking: Reported on 9/13/2021), Disp: , Rfl:     sildenafil (REVATIO) 20 mg tablet, Take 20 mg by mouth  (Patient not taking: Reported on 9/9/2021), Disp: , Rfl:     Synjardy XR  MG TB24, , Disp: , Rfl:   Allergies   Allergen Reactions    Iodinated Diagnostic Agents Facial Swelling       Labs:  Lab Results   Component Value Date    K 3 5 10/31/2020     10/31/2020    CO2 27 10/31/2020    BUN 13 10/31/2020    CREATININE 1 04 10/31/2020    CALCIUM 9 5 10/31/2020     Lab Results   Component Value Date    WBC 15 30 (H) 10/31/2020    HGB 15 8 10/31/2020    HCT 46 1 10/31/2020    MCV 99 (H) 10/31/2020     10/31/2020     Lab Results   Component Value Date    TRIG 214 (H) 10/29/2020    HDL 45 10/29/2020     Imaging:  ECG today shows atrial fibrillation  CARDIAC PCI (10/30/2020):  1ST LESION INTERVENTIONS:  Following pre-dilation, a Res[employer] Pj Rx 2 5 x 12mm drug-eluting stent was placed across the 95% lesion in mid LAD and deployed at a maximum inflation pressure of 12 mikie  There was 0% residual stenosis  JOSE flow remained grade 3      2ND LESION INTERVENTIONS:  Following pre-dilation and under GuideLiner support, a [employer]ience [place] Rx 3 0 x 38mm drug-eluting stent was positioned to overlap with the stent in mid vessel, while covering a 70% proximal stenosis, and deployed at a maximum inflation  pressure of 18 mikie  There was full stent expansion and apposition by IVUS, with 0% residual stenosis and normal JOSE 3 flow      3RD LESION INTERVENTIONS:  Following pre-dilation and IVUS interrogation, a [employer]ience [place] Rx 3 5 x 23mm drug-eluting stent was placed across the 70% lesion in the left main artery and deployed at a maximum inflation pressure of 18 mikie  There was 0% residual  stenosis  IVUS disclosed full stent expansion and apposition, with minimal luminal cross section area increased from 4 2 to 9 3 sq mm  JOSE flow remained grade 3      Summary:  Successful IVUS-guided unprotected left main and LAD PCI  CARDIAC CATH (11/27/20):  CORONARY CIRCULATION:  The coronary circulation is right dominant  Left main: The vessel was large sized and moderately calcified  There is 40-50% calcified stenosis in mid distal LM  IVUS showed MLA 5 3  LAD: The vessel was medium to large sized and moderately calcified  Proximal LAD has 50% stenosis  Mid LAD is diffusely disease   Immediately after D1 there is 70% stenosis followed by 90% stenosis  Distal LAD has luminal irregualirities  D1 is medium to large caliber long vessel and normal  Circumflex: The vessel was medium sized and moderately calcified  Diffusely disease with maximum stenosis of 50% in mid portion  OM1 is small vessel with proximal 80% focal stenosis  RCA: The vessel was large sized (dominant) and mildly calcified  Proxmial RCA has focal 50% stenosis s/p negative iFR 0 95  Mid RCA has 30% stenosis  RPDA is medium sized vessel with 50% stenosis in mid     CARDIAC STRUCTURES:  There was no aortic stenosis      HEMODYNAMICS:  Hemodynamic assessment demonstrated moderately elevated LVEDP      1ST LESION INTERVENTIONS:  Steady baseline values were obtained  Mean arterial pressure and mean distal coronary pressures were then obtained at maximum hyperemia  Based on the results of this study, the lesion was judged to be non-significant and no intervention  was performed  Proximal RCA: verrata pressure wire was used  IFR 0 95 which is not significant     2ND LESION INTERVENTIONS:  IVUS of LM done: mid distal LM MLA 5 3     INDICATIONS:  Coronary artery disease: suspected CAD  Congestive heart failure with cardiomyopathy  Cardiac: arrhythmia      IMPRESSIONS:  Mid - distal LM calcified stenosis 40-50%, IVUS MLA 5 3  Mid LAD calcified stenosis (70% stenosis followed by 90%)  Moderate disease of mid LCX with maximum of 50% stenosis  Proximal RCA 50% stenosis s/p negative iFR, RPDA 50% stenosis in mid      ECHO (10/25/2020):  LEFT VENTRICLE:  Systolic function was mildly reduced  Ejection fraction was estimated to be 45 %  There was mild diffuse hypokinesis  Wall thickness was mildly increased  There was mild concentric hypertrophy    Features were consistent with a pseudonormal left ventricular filling pattern, with concomitant abnormal relaxation and increased filling pressure (grade 2 diastolic dysfunction)      MITRAL VALVE:  There was trace regurgitation      AORTIC VALVE:  There was trace regurgitation      TRICUSPID VALVE:  There was trace to mild regurgitation  Pulmonary artery systolic pressure was within the normal range      AORTA:  The root exhibited mild dilatation - 3 8 cm  Review of Systems:  Review of Systems   Constitutional: Negative  HENT: Negative  Eyes: Negative  Respiratory: Negative  Cardiovascular: Negative  Gastrointestinal: Negative  Musculoskeletal: Negative  Skin: Negative  Allergic/Immunologic: Negative  Neurological: Negative  Hematological: Negative  Psychiatric/Behavioral: Negative  All other systems reviewed and are negative  Vitals:    09/13/21 0946   BP: 132/80   BP Location: Left arm   Patient Position: Sitting   Cuff Size: Standard   Pulse: 79   SpO2: 98%   Weight: 94 7 kg (208 lb 12 8 oz)   Height: 5' 8" (1 727 m)       Physical Exam:  Physical Exam  Vitals and nursing note reviewed  Constitutional:       Appearance: He is well-developed  HENT:      Head: Normocephalic and atraumatic  Eyes:      General: No scleral icterus  Right eye: No discharge  Left eye: No discharge  Pupils: Pupils are equal, round, and reactive to light  Neck:      Thyroid: No thyromegaly  Vascular: No JVD  Cardiovascular:      Rate and Rhythm: Normal rate and regular rhythm  No extrasystoles are present  Pulses: Normal pulses  No decreased pulses  Heart sounds: Normal heart sounds, S1 normal and S2 normal  No murmur heard  No friction rub  No gallop  Pulmonary:      Effort: Pulmonary effort is normal  No respiratory distress  Breath sounds: Normal breath sounds  No wheezing or rales  Abdominal:      General: Bowel sounds are normal  There is no distension  Palpations: Abdomen is soft  Tenderness: There is no abdominal tenderness  Musculoskeletal:         General: No tenderness or deformity  Normal range of motion        Cervical back: Normal range of motion and neck supple  Skin:     General: Skin is warm and dry  Findings: No rash  Neurological:      Mental Status: He is alert and oriented to person, place, and time  Cranial Nerves: No cranial nerve deficit  Psychiatric:         Thought Content: Thought content normal          Judgment: Judgment normal      Counseling / Coordination of Care  Total office time spent today 25 minutes  Greater than 50% of total time was spent with the patient and / or family counseling and / or coordination of care

## 2021-09-13 NOTE — PATIENT INSTRUCTIONS
Low-Sodium Diet   AMBULATORY CARE:   A low-sodium diet  limits foods that are high in sodium (salt)  You will need to follow a low-sodium diet if you have high blood pressure, kidney disease, or heart failure  You may also need to follow this diet if you have a condition that is causing your body to retain (hold) extra fluid  You may need to limit the amount of sodium you eat in a day to 1,500 to 2,000 mg  Ask your healthcare provider how much sodium you can have each day  How to use food labels to choose foods that are low in sodium:  Read food labels to find the amount of sodium they contain  The amount of sodium is listed in milligrams (mg)  The % Daily Value (DV) column tells you how much of your daily needs are met by 1 serving of the food for each nutrient listed  Choose foods that have less than 5% of the DV of sodium  These foods are considered low in sodium  Foods that have 20% or more of the DV of sodium are considered high in sodium  Some food labels may also list any of the following terms that tell you about the sodium content in the food:  · Sodium-free:  Less than 5 mg in each serving    · Very low sodium:  35 mg of sodium or less in each serving    · Low sodium:  140 mg of sodium or less in each serving    · Reduced sodium: At least 25% less sodium in each serving than the regular type    · Light in sodium:  50% less sodium in each serving    · Unsalted or no added salt:  No extra salt is added during processing (the food may still contain sodium)     Foods to avoid:  Salty foods are high in sodium  You should avoid the following:  · Processed foods:      ? Mixes for cornbread, biscuits, cake, and pudding     ? Instant foods, such as potatoes, cereals, noodles, and rice     ? Packaged foods, such as bread stuffing, rice and pasta mixes, snack dip mixes, and macaroni and cheese     ? Canned foods, such as canned vegetables, soups, broths, sauces, and vegetable or tomato juice    ?  Snack foods, such as salted chips, popcorn, pretzels, pork rinds, salted crackers, and salted nuts    ? Frozen foods, such as dinners, entrees, vegetables with sauces, and breaded meats    ? Sauerkraut, pickled vegetables, and other foods prepared in brine    · Meats and cheeses:      ? Smoked or cured meat, such as corned beef, moreira, ham, hot dogs, and sausage    ? Canned meats or spreads, such as potted meats, sardines, anchovies, and imitation seafood    ? Deli or lunch meats, such as bologna, ham, turkey, and roast beef    ? Processed cheese, such as American cheese and cheese spreads    · Condiments, sauces, and seasonings:      ? Salt (¼ teaspoon of salt contains 575 mg of sodium)    ? Seasonings made with salt, such as garlic salt, celery salt, onion salt, and seasoned salt    ? Regular soy sauce, barbecue sauce, teriyaki sauce, steak sauce, Worcestershire sauce, and most flavored vinegars    ? Canned gravy and mixes     ? Regular condiments, such as mustard, ketchup, and salad dressings    ? Pickles and olives    ? Meat tenderizers and monosodium glutamate (MSG)    Foods to include:  Read the food label to find the exact amount of sodium in each serving  · Bread and cereal:  Try to choose breads with less than 80 mg of sodium per serving  ? Bread, roll, yue, tortilla, or unsalted crackers  ? Ready-to-eat cereals with less than 5% DV of sodium (examples include shredded wheat and puffed rice)    ? Pasta    · Vegetables and fruits:      ? Unsalted fresh, frozen, or canned vegetables    ? Fresh, frozen, or canned fruits    ? Fruit juice    · Dairy:  One serving has about 150 mg of sodium  ? Milk, all types    ? Yogurt    ? Hard cheese, such as cheddar, Swiss, [employer], or mozzarella    · Meat and other protein foods:  Some raw meats may have added sodium  ? Plain meats, fish, and poultry     ? Eggs    · Other foods:      ? Homemade pudding    ? Unsalted nuts, popcorn, or pretzels    ?  Unsalted butter or margarine    Ways to decrease sodium:   · Add spices and herbs to foods instead of salt during cooking  Use salt-free seasonings to add flavor to foods  Examples include onion powder, garlic powder, basil, romano powder, paprika, and parsley  Try lemon or lime juice or vinegar to give foods a tart flavor  Use hot peppers, pepper, or cayenne pepper to add a spicy flavor  · Do not keep a salt shaker at your kitchen table  This may help keep you from adding salt to food at the table  A teaspoon of salt has 2,300 mg of sodium  It may take time to get used to enjoying the natural flavor of food instead of adding salt  Talk to your healthcare provider before you use salt substitutes  Some salt substitutes have a high amount of potassium and need to be avoided if you have kidney disease  · Choose low-sodium foods at restaurants  Meals from restaurants are often high in sodium  Some restaurants have nutrition information on the menu that tells you the amount of sodium in their foods  If possible, ask for your food to be prepared with less, or no salt  · Shop for unsalted or low-sodium foods and snacks at the grocery store  Examples include unsalted or low-sodium broths, soups, and canned vegetables  Choose fresh or frozen vegetables instead  Choose unsalted nuts or seeds or fresh fruits or vegetables as snacks  Read food labels and choose salt-free, very low-sodium, or low-sodium foods  © Copyright [employer] 2021 Information is for End User's use only and may not be sold, redistributed or otherwise used for commercial purposes  All illustrations and images included in CareNotes® are the copyrighted property of [employer] A M [employer] Inc  or Julio C Brunner   The above information is an  only  It is not intended as medical advice for individual conditions or treatments  Talk to your doctor, nurse or pharmacist before following any medical regimen to see if it is safe and effective for you

## 2021-09-15 ENCOUNTER — TELEPHONE (OUTPATIENT)
Dept: CARDIOLOGY CLINIC | Facility: CLINIC | Age: 61
End: 2021-09-15

## 2021-09-15 NOTE — TELEPHONE ENCOUNTER
He does not need auth for his Cardioversion [id number] on 9/17/21 at [facility] per Leia Piedra at [address]  Ref# [id number]

## 2021-10-01 ENCOUNTER — HOSPITAL ENCOUNTER (OUTPATIENT)
Dept: NON INVASIVE DIAGNOSTICS | Facility: CLINIC | Age: 61
Discharge: HOME | End: 2021-10-01
Payer: COMMERCIAL

## 2021-10-01 DIAGNOSIS — I73.9 PAD (PERIPHERAL ARTERY DISEASE) (HCC): ICD-10-CM

## 2021-10-01 PROCEDURE — 93923 UPR/LXTR ART STDY 3+ LVLS: CPT

## 2021-10-01 PROCEDURE — 93925 LOWER EXTREMITY STUDY: CPT

## 2021-10-03 PROCEDURE — 93922 UPR/L XTREMITY ART 2 LEVELS: CPT | Performed by: SURGERY

## 2021-10-03 PROCEDURE — 93925 LOWER EXTREMITY STUDY: CPT | Performed by: SURGERY

## 2021-10-06 DIAGNOSIS — I48.19 PERSISTENT ATRIAL FIBRILLATION (HCC): Primary | ICD-10-CM

## 2021-10-06 NOTE — TELEPHONE ENCOUNTER
Evaristo Valero, patient rescheduled for CV on 10/21 in [facility]  Will this require auth?

## 2021-10-09 ENCOUNTER — APPOINTMENT (OUTPATIENT)
Dept: LAB | Facility: CLINIC | Age: 61
End: 2021-10-09
Payer: COMMERCIAL

## 2021-10-09 DIAGNOSIS — I48.19 PERSISTENT ATRIAL FIBRILLATION (HCC): ICD-10-CM

## 2021-10-09 LAB
ALBUMIN SERPL BCP-MCNC: 3.8 G/DL (ref 3.5–5)
ALP SERPL-CCNC: 59 U/L (ref 46–116)
ALT SERPL W P-5'-P-CCNC: 44 U/L (ref 12–78)
ANION GAP SERPL CALCULATED.3IONS-SCNC: 7 MMOL/L (ref 4–13)
AST SERPL W P-5'-P-CCNC: 19 U/L (ref 5–45)
BASOPHILS # BLD AUTO: 0.08 THOUSANDS/ΜL (ref 0–0.1)
BASOPHILS NFR BLD AUTO: 1 % (ref 0–1)
BILIRUB SERPL-MCNC: 0.48 MG/DL (ref 0.2–1)
BUN SERPL-MCNC: 11 MG/DL (ref 5–25)
CALCIUM SERPL-MCNC: 9.9 MG/DL (ref 8.3–10.1)
CHLORIDE SERPL-SCNC: 105 MMOL/L (ref 100–108)
CO2 SERPL-SCNC: 26 MMOL/L (ref 21–32)
CREAT SERPL-MCNC: 0.99 MG/DL (ref 0.6–1.3)
EOSINOPHIL # BLD AUTO: 0.16 THOUSAND/ΜL (ref 0–0.61)
EOSINOPHIL NFR BLD AUTO: 2 % (ref 0–6)
ERYTHROCYTE [DISTWIDTH] IN BLOOD BY AUTOMATED COUNT: 13.2 % (ref 11.6–15.1)
GFR SERPL CREATININE-BSD FRML MDRD: 81 ML/MIN/1.73SQ M
GLUCOSE SERPL-MCNC: 189 MG/DL (ref 65–140)
HCT VFR BLD AUTO: 48.4 % (ref 36.5–49.3)
HGB BLD-MCNC: 15.8 G/DL (ref 12–17)
IMM GRANULOCYTES # BLD AUTO: 0.02 THOUSAND/UL (ref 0–0.2)
IMM GRANULOCYTES NFR BLD AUTO: 0 % (ref 0–2)
LYMPHOCYTES # BLD AUTO: 2.5 THOUSANDS/ΜL (ref 0.6–4.47)
LYMPHOCYTES NFR BLD AUTO: 25 % (ref 14–44)
MCH RBC QN AUTO: 31.5 PG (ref 26.8–34.3)
MCHC RBC AUTO-ENTMCNC: 32.6 G/DL (ref 31.4–37.4)
MCV RBC AUTO: 97 FL (ref 82–98)
MONOCYTES # BLD AUTO: 0.98 THOUSAND/ΜL (ref 0.17–1.22)
MONOCYTES NFR BLD AUTO: 10 % (ref 4–12)
NEUTROPHILS # BLD AUTO: 6.41 THOUSANDS/ΜL (ref 1.85–7.62)
NEUTS SEG NFR BLD AUTO: 62 % (ref 43–75)
NRBC BLD AUTO-RTO: 0 /100 WBCS
PLATELET # BLD AUTO: 249 THOUSANDS/UL (ref 149–390)
PMV BLD AUTO: 11.3 FL (ref 8.9–12.7)
POTASSIUM SERPL-SCNC: 3.6 MMOL/L (ref 3.5–5.3)
PROT SERPL-MCNC: 7.2 G/DL (ref 6.4–8.2)
RBC # BLD AUTO: 5.01 MILLION/UL (ref 3.88–5.62)
SODIUM SERPL-SCNC: 138 MMOL/L (ref 136–145)
WBC # BLD AUTO: 10.15 THOUSAND/UL (ref 4.31–10.16)

## 2021-10-09 PROCEDURE — 80053 COMPREHEN METABOLIC PANEL: CPT

## 2021-10-09 PROCEDURE — 85025 COMPLETE CBC W/AUTO DIFF WBC: CPT

## 2021-10-09 PROCEDURE — 36415 COLL VENOUS BLD VENIPUNCTURE: CPT

## 2021-10-21 ENCOUNTER — ANESTHESIA EVENT (OUTPATIENT)
Dept: NON INVASIVE DIAGNOSTICS | Facility: HOSPITAL | Age: 61
End: 2021-10-21

## 2021-10-21 ENCOUNTER — HOSPITAL ENCOUNTER (OUTPATIENT)
Dept: NON INVASIVE DIAGNOSTICS | Facility: HOSPITAL | Age: 61
Discharge: HOME | End: 2021-10-21
Attending: INTERNAL MEDICINE | Admitting: INTERNAL MEDICINE
Payer: COMMERCIAL

## 2021-10-21 ENCOUNTER — ANESTHESIA (OUTPATIENT)
Dept: NON INVASIVE DIAGNOSTICS | Facility: HOSPITAL | Age: 61
End: 2021-10-21

## 2021-10-21 VITALS
DIASTOLIC BLOOD PRESSURE: 76 MMHG | SYSTOLIC BLOOD PRESSURE: 104 MMHG | TEMPERATURE: 97 F | RESPIRATION RATE: 18 BRPM | OXYGEN SATURATION: 97 % | HEART RATE: 76 BPM

## 2021-10-21 DIAGNOSIS — I48.19 PERSISTENT ATRIAL FIBRILLATION (HCC): ICD-10-CM

## 2021-10-21 DIAGNOSIS — I25.5 ISCHEMIC CARDIOMYOPATHY: Primary | ICD-10-CM

## 2021-10-21 LAB — GLUCOSE SERPL-MCNC: 150 MG/DL (ref 65–140)

## 2021-10-21 PROCEDURE — 93005 ELECTROCARDIOGRAM TRACING: CPT

## 2021-10-21 PROCEDURE — 92960 CARDIOVERSION ELECTRIC EXT: CPT | Performed by: INTERNAL MEDICINE

## 2021-10-21 PROCEDURE — 82948 REAGENT STRIP/BLOOD GLUCOSE: CPT

## 2021-10-21 PROCEDURE — 92960 CARDIOVERSION ELECTRIC EXT: CPT

## 2021-10-21 RX ORDER — LIDOCAINE HYDROCHLORIDE 10 MG/ML
INJECTION, SOLUTION EPIDURAL; INFILTRATION; INTRACAUDAL; PERINEURAL AS NEEDED
Status: DISCONTINUED | OUTPATIENT
Start: 2021-10-21 | End: 2021-10-21

## 2021-10-21 RX ORDER — SODIUM CHLORIDE 9 MG/ML
INJECTION, SOLUTION INTRAVENOUS CONTINUOUS PRN
Status: DISCONTINUED | OUTPATIENT
Start: 2021-10-21 | End: 2021-10-21

## 2021-10-21 RX ORDER — PROPOFOL 10 MG/ML
INJECTION, EMULSION INTRAVENOUS AS NEEDED
Status: DISCONTINUED | OUTPATIENT
Start: 2021-10-21 | End: 2021-10-21

## 2021-10-21 RX ORDER — SODIUM CHLORIDE 9 MG/ML
50 INJECTION, SOLUTION INTRAVENOUS CONTINUOUS
Status: DISCONTINUED | OUTPATIENT
Start: 2021-10-21 | End: 2021-10-22 | Stop reason: HOSPADM

## 2021-10-21 RX ORDER — METOCLOPRAMIDE HYDROCHLORIDE 5 MG/ML
10 INJECTION INTRAMUSCULAR; INTRAVENOUS ONCE AS NEEDED
Status: DISCONTINUED | OUTPATIENT
Start: 2021-10-21 | End: 2021-10-22 | Stop reason: HOSPADM

## 2021-10-21 RX ORDER — DIPHENHYDRAMINE HYDROCHLORIDE 50 MG/ML
12.5 INJECTION INTRAMUSCULAR; INTRAVENOUS ONCE AS NEEDED
Status: DISCONTINUED | OUTPATIENT
Start: 2021-10-21 | End: 2021-10-22 | Stop reason: HOSPADM

## 2021-10-21 RX ORDER — LIDOCAINE HYDROCHLORIDE 10 MG/ML
0.5 INJECTION, SOLUTION EPIDURAL; INFILTRATION; INTRACAUDAL; PERINEURAL ONCE AS NEEDED
Status: DISCONTINUED | OUTPATIENT
Start: 2021-10-21 | End: 2021-10-22 | Stop reason: HOSPADM

## 2021-10-21 RX ORDER — ONDANSETRON 2 MG/ML
4 INJECTION INTRAMUSCULAR; INTRAVENOUS ONCE AS NEEDED
Status: DISCONTINUED | OUTPATIENT
Start: 2021-10-21 | End: 2021-10-22 | Stop reason: HOSPADM

## 2021-10-21 RX ORDER — FUROSEMIDE 40 MG/1
20 TABLET ORAL DAILY
Refills: 0
Start: 2021-10-21

## 2021-10-21 RX ADMIN — LIDOCAINE HYDROCHLORIDE 50 MG: 10 INJECTION, SOLUTION EPIDURAL; INFILTRATION; INTRACAUDAL at 09:45

## 2021-10-21 RX ADMIN — PROPOFOL 100 MG: 10 INJECTION, EMULSION INTRAVENOUS at 09:45

## 2021-10-21 RX ADMIN — APIXABAN 5 MG: 5 TABLET, FILM COATED ORAL at 09:24

## 2021-10-21 RX ADMIN — SODIUM CHLORIDE: 0.9 INJECTION, SOLUTION INTRAVENOUS at 09:32

## 2021-10-22 LAB
ATRIAL RATE: 92 BPM
QRS AXIS: 33 DEGREES
QRSD INTERVAL: 70 MS
QT INTERVAL: 340 MS
QTC INTERVAL: 400 MS
T WAVE AXIS: 8 DEGREES
VENTRICULAR RATE: 83 BPM

## 2021-10-22 PROCEDURE — 93010 ELECTROCARDIOGRAM REPORT: CPT | Performed by: INTERNAL MEDICINE

## 2021-11-01 ENCOUNTER — OFFICE VISIT (OUTPATIENT)
Dept: FAMILY MEDICINE CLINIC | Facility: CLINIC | Age: 61
End: 2021-11-01

## 2021-11-01 VITALS
SYSTOLIC BLOOD PRESSURE: 130 MMHG | HEIGHT: 68 IN | BODY MASS INDEX: 30.46 KG/M2 | WEIGHT: 201 LBS | DIASTOLIC BLOOD PRESSURE: 80 MMHG

## 2021-11-01 DIAGNOSIS — Z71.9 CONSULTATION WITHOUT SPECIFIC COMPLAINT: Primary | ICD-10-CM

## 2021-11-01 PROCEDURE — 99214 OFFICE O/P EST MOD 30 MIN: CPT | Performed by: FAMILY MEDICINE

## 2021-11-05 ENCOUNTER — TELEPHONE (OUTPATIENT)
Dept: CARDIOLOGY CLINIC | Facility: CLINIC | Age: 61
End: 2021-11-05

## 2021-11-26 ENCOUNTER — TELEPHONE (OUTPATIENT)
Dept: VASCULAR SURGERY | Facility: CLINIC | Age: 61
End: 2021-11-26

## 2021-12-22 ENCOUNTER — TELEPHONE (OUTPATIENT)
Dept: ADMINISTRATIVE | Facility: HOSPITAL | Age: 61
End: 2021-12-22

## 2021-12-29 ENCOUNTER — HOSPITAL ENCOUNTER (OUTPATIENT)
Dept: NON INVASIVE DIAGNOSTICS | Facility: CLINIC | Age: 61
Discharge: HOME | End: 2021-12-29
Payer: COMMERCIAL

## 2021-12-29 DIAGNOSIS — I65.22 ASYMPTOMATIC STENOSIS OF LEFT CAROTID ARTERY: ICD-10-CM

## 2021-12-29 PROCEDURE — 93880 EXTRACRANIAL BILAT STUDY: CPT

## 2022-01-03 PROCEDURE — 93880 EXTRACRANIAL BILAT STUDY: CPT | Performed by: SURGERY

## 2022-01-24 ENCOUNTER — TELEPHONE (OUTPATIENT)
Dept: GASTROENTEROLOGY | Facility: CLINIC | Age: 62
End: 2022-01-24

## 2022-01-24 NOTE — TELEPHONE ENCOUNTER
KENIA: Spoke with patient  Patient did not now what functional diarrhea was, and why it was in his chart  He understands this is part of the IBS-D, and he has not concern about removing it from his chart at this time  He did voice continued diarrhea occasionally, and fiber constipating him  Suggested he decrease the amount of fiber to help with these symptoms  He will let us know if diarrhea continues

## 2022-01-24 NOTE — TELEPHONE ENCOUNTER
Dr Dilan Salazar pt  Patient received a message from Dr Dilan Salazar to call the office  Please call patient back on his cell  [phone]

## 2022-02-04 ENCOUNTER — HOSPITAL ENCOUNTER (OUTPATIENT)
Dept: NON INVASIVE DIAGNOSTICS | Facility: CLINIC | Age: 62
Discharge: HOME | End: 2022-02-04
Payer: COMMERCIAL

## 2022-02-04 VITALS — WEIGHT: 201 LBS | HEIGHT: 68 IN | BODY MASS INDEX: 30.46 KG/M2

## 2022-02-04 DIAGNOSIS — I48.19 PERSISTENT ATRIAL FIBRILLATION (HCC): ICD-10-CM

## 2022-02-04 DIAGNOSIS — I25.10 CORONARY ARTERY DISEASE INVOLVING NATIVE CORONARY ARTERY OF NATIVE HEART WITHOUT ANGINA PECTORIS: ICD-10-CM

## 2022-02-04 LAB
BASELINE ST DEPRESSION: 0 MM
MAX HR PERCENT: 173 %
MAX HR: 158 BPM
NUC STRESS EJECTION FRACTION: 67 %
RATE PRESSURE PRODUCT: NORMAL
SL CV REST NUCLEAR ISOTOPE DOSE: 10.9 MCI
SL CV STRESS NUCLEAR ISOTOPE DOSE: 33 MCI
SL CV STRESS RECOVERY BP: NORMAL MMHG
SL CV STRESS RECOVERY HR: 109 BPM
SL CV STRESS RECOVERY O2 SAT: 99 %
SL CV STRESS STAGE REACHED: 4
STRESS ANGINA INDEX: 0
STRESS BASELINE BP: NORMAL MMHG
STRESS BASELINE HR: 81 BPM
STRESS DUKE TREADMILL SCORE: 10
STRESS O2 SAT REST: 99 %
STRESS PEAK HR: 173 BPM
STRESS PERCENT HR: 109 %
STRESS POST ESTIMATED WORKLOAD: 10.9 METS
STRESS POST EXERCISE DUR MIN: 9 MIN
STRESS POST EXERCISE DUR SEC: 30 SEC
STRESS POST O2 SAT PEAK: 98 %
STRESS POST PEAK BP: 182 MMHG
STRESS ST DEPRESSION: 0 MM
STRESS TARGET HR: 173 BPM
STRESS/REST PERFUSION RATIO: 0.89

## 2022-02-04 PROCEDURE — 93017 CV STRESS TEST TRACING ONLY: CPT

## 2022-02-04 PROCEDURE — 78452 HT MUSCLE IMAGE SPECT MULT: CPT | Performed by: INTERNAL MEDICINE

## 2022-02-04 PROCEDURE — A9502 TC99M TETROFOSMIN: HCPCS

## 2022-02-04 PROCEDURE — 78452 HT MUSCLE IMAGE SPECT MULT: CPT

## 2022-02-04 PROCEDURE — G1004 CDSM NDSC: HCPCS

## 2022-02-04 PROCEDURE — 93016 CV STRESS TEST SUPVJ ONLY: CPT | Performed by: INTERNAL MEDICINE

## 2022-02-04 PROCEDURE — 93018 CV STRESS TEST I&R ONLY: CPT | Performed by: INTERNAL MEDICINE

## 2022-02-08 LAB
CHEST PAIN STATEMENT: NORMAL
MAX DIASTOLIC BP: 90 MMHG
MAX HEART RATE: 173 BPM
MAX PREDICTED HEART RATE: 158 BPM
MAX. SYSTOLIC BP: 182 MMHG
PROTOCOL NAME: NORMAL
REASON FOR TERMINATION: NORMAL
TARGET HR FORMULA: NORMAL
TEST INDICATION: NORMAL
TIME IN EXERCISE PHASE: NORMAL

## 2022-02-11 PROBLEM — K59.1 FUNCTIONAL DIARRHEA: Status: RESOLVED | Noted: 2021-06-22 | Resolved: 2022-02-11

## 2022-02-14 ENCOUNTER — DOCUMENTATION (OUTPATIENT)
Dept: OTHER | Facility: HOSPITAL | Age: 62
End: 2022-02-14

## 2022-02-14 NOTE — PROGRESS NOTES
After further discussion with patient, the documentation of alcohol abuse and dry cough were not accurate or reflective of the patient's status at the time of hospitalization  These were erroneously documented in the patient's chart

## 2022-02-17 ENCOUNTER — OFFICE VISIT (OUTPATIENT)
Dept: FAMILY MEDICINE CLINIC | Facility: CLINIC | Age: 62
End: 2022-02-17

## 2022-02-17 VITALS — HEART RATE: 86 BPM | WEIGHT: 208 LBS | SYSTOLIC BLOOD PRESSURE: 100 MMHG | DIASTOLIC BLOOD PRESSURE: 60 MMHG

## 2022-02-17 DIAGNOSIS — Z02.89 ENCOUNTER FOR FEDERAL AVIATION ADMINISTRATION (FAA) EXAMINATION: Primary | ICD-10-CM

## 2022-02-17 PROCEDURE — 99499 UNLISTED E&M SERVICE: CPT | Performed by: FAMILY MEDICINE

## 2022-02-17 NOTE — PROGRESS NOTES
Chief Complaint   Patient presents with    Physical Exam     FAA 2nd class, no ekg, meds, correctives

## 2022-02-18 NOTE — PROGRESS NOTES
Assessment/Plan:    Pt is a 58 yo M w/ DM, YEE, asymptomatic L ICA stenosis, afib (on Eliquis), cardiomyopathy, CAD s/p PORTER x 3 10/30/20, lumbar radiculopathy, obesity, hx of EtOH abuse, presents to discuss PAD    PAD (peripheral artery disease) ([facility] )  -reviewed LEADs which show R: 0 76/-/- and L: 1 2/-/- with R SFA/pop occlusion and L TP trunk stenosis  -now s/p RLE agram w/ R SFA PTA/stent 12/1/20  -RLE claudication completely resolved; also did runoff of LLE without significant treatable stenosis  -reviewed LEADs which show R: 1 11/-- and L: 1 18/-/- without focal stenoses  -will repeat for surveillance in 1 year    Asymptomatic stenosis of left carotid artery  -reviewed carotid DU which shows R ICA wnl and L ICA <50% stenosis  -will continue surveillance on an every 2 year basis (already ordered)    Type 2 diabetes mellitus without complication, without long-term current use of insulin ([facility] )  -last A1C: 7 0  -continue to improve control; care per PCP    Persistent atrial fibrillation (HCC)  Coronary artery disease involving native coronary artery of native heart without angina pectoris  -multivessel disease s/p PORTER x 3 on 10/30/20     BMI 39 0-39 9,adult  -continues to work on diet/exercise and has lost >20 lbs    Medications  -continue statin for life  -also on Brillenta (coronary PORTER) and Eliquis (for afib); if brillenta is stopped, will need to restart asa 81mg     "contrast allergy"  -seems like he likely had a contrast allergy to felicia and not to iodinated dye but tolerated agram w/ prep without issue    Subjective:      Patient ID: Jose Arias is a 57 y o  male  Patient had CV done on 12/29/21 and DARYN  done on 10/1/21  Pt denies claudication, rest pain, numbness,open wounds and TIA CVA symptoms  Pt is on ASA, Eliquis, Brillinta, and Atorvastatin  HPI:     Patient returns for [facility] for PAD and carotid disease     Patient complained of pain in the calves    This happens mostly when he is travelling for work   He sells aviation fuel  He says his legs "hurt" and "bind up"  It feels like a [employer]  Occurs in both legs, worse in the right  It occurs every time he walks  He can go only 20-30 feet without stopping  Pain resolves with rest   He also notes sciatica on both sides  Also has some burning in the posterior thighs      Now s/p RLE angiogram   No complications  RLE claudication is completely resolved  Has been walking >2miles daily at an incline on a treadmill  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 getting his medical clearance in order  Recent stress test for this      Quit smoking in '98  Altered diet and increased exercise and lost about 50lbs in a year and a half  Denies SOB/CP       Hx of cerebral aneurysms in his father  Denies hx of CVA or stroke symptoms  Denies amaurosis, facial droop, garbled speech, unilateral weakness/numbness     Taking ASA, Brillenta (since 10/30 coronary stents), Eliquis (afib)     Reports hx of facial swelling 20 yrs ago after having a head MRI with dye  Had cardiac cath 2 wks ago after pre-prep without issue  Had agram w/ prep without issue      The following portions of the patient's history were reviewed and updated as appropriate: allergies, current medications, past family history, past medical history, past social history, past surgical history and problem list     Review of Systems   Constitutional: Negative  HENT: Negative  Eyes: Negative  Negative for visual disturbance  Respiratory: Negative  Negative for shortness of breath  Cardiovascular: Negative  Negative for chest pain and leg swelling  Gastrointestinal: Negative  Endocrine: Negative  Genitourinary: Negative  Musculoskeletal: Negative  Negative for gait problem  Skin: Negative  Negative for wound  Allergic/Immunologic: Negative  Neurological: Negative  Negative for facial asymmetry, speech difficulty, weakness and numbness     Hematological: Negative  Psychiatric/Behavioral: Negative  Objective:      BP 98/60 (BP Location: Left arm, Patient Position: Sitting, Cuff Size: Adult)   Pulse 86   Ht 5' 8" (1 727 m)   Wt 94 4 kg (208 lb 3 2 oz)   BMI 31 66 kg/m²          Physical Exam  Vitals and nursing note reviewed  Constitutional:       Appearance: He is well-developed  HENT:      Head: Normocephalic and atraumatic  Eyes:      Conjunctiva/sclera: Conjunctivae normal    Cardiovascular:      Rate and Rhythm: Normal rate  Rhythm irregular  Pulses:           Radial pulses are 2+ on the right side and 2+ on the left side  Femoral pulses are 1+ on the right side and 1+ on the left side  Dorsalis pedis pulses are 2+ on the right side and 2+ on the left side  Posterior tibial pulses are 2+ on the right side and 2+ on the left side  Heart sounds: Normal heart sounds  No murmur heard  Comments: No carotid bruits B  Pulmonary:      Effort: Pulmonary effort is normal  No respiratory distress  Breath sounds: Normal breath sounds  No wheezing or rales  Abdominal:      General: There is no distension  Palpations: Abdomen is soft  Tenderness: There is no abdominal tenderness  There is no rebound  Musculoskeletal:         General: Normal range of motion  Cervical back: Normal range of motion and neck supple  Right lower le+ Edema present  Left lower le+ Edema present  Skin:     General: Skin is warm and dry  Neurological:      Mental Status: He is alert and oriented to person, place, and time  Psychiatric:         Behavior: Behavior normal            I have reviewed and made appropriate changes to the review of systems input by the medical assistant      Vitals:    22 1034 22 1036   BP: 100/68 98/60   BP Location: Right arm Left arm   Patient Position: Sitting Sitting   Cuff Size: Adult Adult   Pulse: 86    Weight: 94 4 kg (208 lb 3 2 oz)    Height: 5' 8" (1 727 m)        Patient Active Problem List   Diagnosis    Epidural lipomatosis    Lower back pain    Lumbar radiculopathy    BMI 39 0-39 9,adult    Chronic pain syndrome    Lumbar stenosis with neurogenic claudication    Spondylolisthesis of lumbar region    Spinal instability, lumbar    PAD [facility]peripheral [facility] disease) [facility]    Sleep apnea    Persistent atrial fibrillation ([facility]    Type 2 diabetes mellitus, without long-term current use of insulin ([facility])    Atrial fibrillation ([facility]    Cardiomyopathy ([facility] )    Coronary artery disease    Asymptomatic stenosis of left carotid artery    Status post insertion of drug-eluting stent into left anterior descending (LAD) artery    Lower abdominal pain       Past Surgical History:   Procedure Laterality Date    CORONARY ANGIOPLASTY WITH STENT PLACEMENT      IR ABDOMINAL AORTAGRAM  12/1/2020       Family History   Problem Relation Age of Onset    Diabetes Mother     Alzheimer's disease Mother     Cancer Sister     Aneurysm Father        Social History     Socioeconomic History    Marital status: /Civil Union     Spouse name: Not on file    Number of children: Not on file    Years of education: Not on file    Highest education level: Not on file   Occupational History    Not on file   Tobacco Use    Smoking status: Former Smoker    Smokeless tobacco: Never Used   Vaping Use    Vaping Use: Never used   Substance and Sexual Activity    Alcohol use: Yes     Comment: Social     Drug use: Never    Sexual activity: Yes     Partners: Female   Other Topics Concern    Not on file   Social History Narrative    Not on file     Social Determinants of Health     Financial Resource Strain: Not on file   Food Insecurity: Not on file   Transportation Needs: Not on file   Physical Activity: Not on file   Stress: Not on file   Social Connections: Not on file   Intimate Partner Violence: Not on file   Housing Stability: Not on file       Allergies Allergen Reactions    Iodinated Diagnostic Agents Facial Swelling         Current Outpatient Medications:     acetaminophen (TYLENOL) 325 mg tablet, Take 650 mg by mouth every 6 (six) hours as needed for mild pain , Disp: , Rfl:     apixaban (Eliquis) 5 mg, Take 5 mg by mouth 2 (two) times a day , Disp: , Rfl:     atorvastatin (LIPITOR) 80 mg tablet, Take 1 tablet (80 mg total) by mouth daily at bedtime, Disp: 30 tablet, Rfl: 0    Empagliflozin-metFORMIN HCl (Synjardy) 12 5-1000 MG TABS, Take 1 tablet by mouth, Disp: , Rfl:     fenofibrate (TRICOR) 54 MG tablet, , Disp: , Rfl:     FREESTYLE LITE test strip, , Disp: , Rfl:     furosemide (LASIX) 40 mg tablet, Take 0 5 tablets (20 mg total) by mouth daily, Disp: , Rfl: 0    lisinopril (ZESTRIL) 10 mg tablet, Take 10 mg by mouth daily, Disp: , Rfl:     loratadine (CLARITIN) 10 mg tablet, Take 10 mg by mouth daily , Disp: , Rfl:     metoprolol succinate (TOPROL-XL) 100 mg 24 hr tablet, Take 1 tablet (100 mg total) by mouth daily, Disp: 60 tablet, Rfl: 0    omega-3-acid ethyl esters (LOVAZA) 1 g capsule, Take 2 g by mouth 2 (two) times a day , Disp: , Rfl:     ticagrelor (BRILINTA) 90 MG, Take 1 tablet (90 mg total) by mouth every 12 (twelve) hours, Disp: 60 tablet, Rfl: 0    DULoxetine (Cymbalta) 60 mg delayed release capsule, Take 60 mg by mouth daily , Disp: , Rfl:     Ozempic, 1 MG/DOSE, 4 MG/3ML SOPN injection pen, , Disp: , Rfl:     sildenafil (REVATIO) 20 mg tablet, Take 20 mg by mouth  (Patient not taking: Reported on 9/9/2021), Disp: , Rfl:     Trulicity 3 CU/3 1MS SOPN, , Disp: , Rfl:

## 2022-02-21 ENCOUNTER — OFFICE VISIT (OUTPATIENT)
Dept: VASCULAR SURGERY | Facility: CLINIC | Age: 62
End: 2022-02-21
Payer: COMMERCIAL

## 2022-02-21 VITALS
BODY MASS INDEX: 31.55 KG/M2 | DIASTOLIC BLOOD PRESSURE: 60 MMHG | HEIGHT: 68 IN | HEART RATE: 86 BPM | WEIGHT: 208.2 LBS | SYSTOLIC BLOOD PRESSURE: 98 MMHG

## 2022-02-21 DIAGNOSIS — E11.9 TYPE 2 DIABETES MELLITUS WITHOUT COMPLICATION, WITHOUT LONG-TERM CURRENT USE OF INSULIN (HCC): ICD-10-CM

## 2022-02-21 DIAGNOSIS — I73.9 PAD (PERIPHERAL ARTERY DISEASE) (HCC): Primary | ICD-10-CM

## 2022-02-21 DIAGNOSIS — I48.19 PERSISTENT ATRIAL FIBRILLATION (HCC): ICD-10-CM

## 2022-02-21 DIAGNOSIS — I65.22 ASYMPTOMATIC STENOSIS OF LEFT CAROTID ARTERY: ICD-10-CM

## 2022-02-21 PROCEDURE — 3008F BODY MASS INDEX DOCD: CPT | Performed by: SURGERY

## 2022-02-21 PROCEDURE — 99214 OFFICE O/P EST MOD 30 MIN: CPT | Performed by: SURGERY

## 2022-02-21 PROCEDURE — 1036F TOBACCO NON-USER: CPT | Performed by: SURGERY

## 2022-02-21 RX ORDER — DULAGLUTIDE 3 MG/.5ML
INJECTION, SOLUTION SUBCUTANEOUS
COMMUNITY
Start: 2022-01-29 | End: 2022-08-04

## 2022-02-21 NOTE — PATIENT INSTRUCTIONS
1) PAD with claudication  -we did an angiogram procedure for the right leg last year and treated a blockage with a balloon and stent  -your ultrasound shows no new blockages in the arteries  -we will do another ultrasound in 12 months    2) Carotid stenosis  -your ultrasound showed mild blockage on the left  -we will repeat your ultrasound in 2 years    3) Medications  -continue your statin  -if they take you off your brillenta, please restart aspirin 81mg once daily

## 2022-06-22 DIAGNOSIS — I48.19 PERSISTENT ATRIAL FIBRILLATION (HCC): Primary | ICD-10-CM

## 2022-07-05 ENCOUNTER — TELEPHONE (OUTPATIENT)
Dept: GASTROENTEROLOGY | Facility: CLINIC | Age: 62
End: 2022-07-05

## 2022-07-05 NOTE — TELEPHONE ENCOUNTER
----- Message from Isha Hyatt sent at 7/5/2022 11:17 AM EDT -----  Regarding: Hanna Rosario,    I hope you are doing well! I am trying to secure my [address] [place] to get my  License back  If you could help me out with Number 16 I would appreciate the help getting through this  I have 60 Days to comply  I am not taking any medicine for this  Thank you very much!   Isha Hyatt  [phone]

## 2022-07-05 NOTE — TELEPHONE ENCOUNTER
Went over chart with RN, suggested that pt has to come in for an OV for current notes to be created for pt  Routing to clerical to set up appt   (Last seen with Elizabeth)  Thank you

## 2022-07-06 NOTE — TELEPHONE ENCOUNTER
Yaneli Troncoso - spoke to patient scheduled office visit for 08/4/2022 - patient is in need of this paperwork for a job sooner than later  Patient only has 60 days  If we could get patient in sooner, he would appreciate it   Please call Sherra Barthel at [phone]

## 2022-07-28 ENCOUNTER — TELEPHONE (OUTPATIENT)
Dept: FAMILY MEDICINE CLINIC | Facility: CLINIC | Age: 62
End: 2022-07-28

## 2022-08-02 RX ORDER — DULAGLUTIDE 4.5 MG/.5ML
INJECTION, SOLUTION SUBCUTANEOUS
COMMUNITY
Start: 2022-05-26

## 2022-08-02 RX ORDER — DEXTROMETHORPHAN HYDROBROMIDE AND PROMETHAZINE HYDROCHLORIDE 15; 6.25 MG/5ML; MG/5ML
SYRUP ORAL
Status: ON HOLD | COMMUNITY
Start: 2022-05-04 | End: 2022-09-07 | Stop reason: ALTCHOICE

## 2022-08-04 ENCOUNTER — OFFICE VISIT (OUTPATIENT)
Dept: GASTROENTEROLOGY | Facility: CLINIC | Age: 62
End: 2022-08-04
Payer: COMMERCIAL

## 2022-08-04 VITALS
DIASTOLIC BLOOD PRESSURE: 80 MMHG | OXYGEN SATURATION: 99 % | BODY MASS INDEX: 31.74 KG/M2 | WEIGHT: 209.4 LBS | SYSTOLIC BLOOD PRESSURE: 116 MMHG | HEIGHT: 68 IN | HEART RATE: 86 BPM

## 2022-08-04 DIAGNOSIS — K58.0 IRRITABLE BOWEL SYNDROME WITH DIARRHEA: Primary | ICD-10-CM

## 2022-08-04 PROCEDURE — 99213 OFFICE O/P EST LOW 20 MIN: CPT | Performed by: PHYSICIAN ASSISTANT

## 2022-08-04 NOTE — LETTER
August 4, 2022     Viral Benz  [address]  [address] [facility] [id number]    Patient: Viral Benz   YOB: 1960   Date of Visit: 8/4/2022       To whom this may concern,    Viral Benz is under our care  He was seen in July of 2021 with symptoms consistent with IBS  He underwent an EGD and a colonoscopy which did not show any other causes of his symptoms  His symptoms resolved with a 2 week course of a medication called rifaximin which is a known treatment for IBS  His symptoms have not recurred  He never required hospitalization for his symptoms  He does not need any further testing or treatment at this time for his IBS         Sincerely,        Tammy Braswell PA-C        CC: No Recipients

## 2022-08-04 NOTE — PROGRESS NOTES
[facility] - Outpatient Follow-up Note  Christ Fox 60 y o  male MRN: [id number]  Encounter: [id number]          ASSESSMENT AND PLAN:      1  IBS-D  - He has a history of IBS-D diagnosed last year and his symptoms have remained controlled/resolved since receiving the course of rifaximin last year  - Will write a letter for the [address] [place] [address]  that his IBS is under control and should not interfere with his medical clearance  - Colonoscopy due in 2026 for continued surveillance due to the tubular adenoma removed last year  ______________________________________________________________________    SUBJECTIVE:  Octavia Beckett is a pleasant 61 yo M presenting get paperwork for the [address] [place] [address]  to be a   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  Because of his IBS diagnosed last year they are requiring paperwork for this  He was last seen in July of 2021 with severe diarrhea and abdominal pain several months after starting Ozempic  He had an endoscopy and a colonoscopy per for further evaluation and this showed an irregular Z-line, gastritis, fissuring in the small bowel folds, diverticulosis, internal hemorrhoids, and 1 polyp was removed  The polyp was a tubular adenoma  Biopsies were otherwise negative for celiac disease, H pylori, Tucker's esophagus, or microscopic colitis  His symptoms resolved after he was given a rifaximin course  He continues to have regular bowel movements without abdominal pain and is doing great without any recurrence of symptoms  REVIEW OF SYSTEMS IS OTHERWISE NEGATIVE        Historical Information   Past Medical History:   Diagnosis Date    Atrial fibrillation (HCC)     Back pain     CPAP (continuous positive airway pressure) dependence     Diabetes mellitus (HCC)     Hyperlipidemia     Hypertension     PAD (peripheral artery disease) (HCC)     Persistent atrial fibrillation (HCC)     Sleep apnea     Spinal stenosis      Past Surgical History:   Procedure Laterality Date    CORONARY ANGIOPLASTY WITH STENT PLACEMENT      IR ABDOMINAL AORTAGRAM  12/1/2020     Social History   Social History     Substance and Sexual Activity   Alcohol Use Yes    Comment: Social      Social History     Substance and Sexual Activity   Drug Use Never     Social History     Tobacco Use   Smoking Status Former Smoker   Smokeless Tobacco Never Used     Family History   Problem Relation Age of Onset    Diabetes Mother     Alzheimer's disease Mother     Cancer Sister     Aneurysm Father        Meds/Allergies       Current Outpatient Medications:     acetaminophen (TYLENOL) 325 mg tablet    apixaban (ELIQUIS) 5 mg    atorvastatin (LIPITOR) 80 mg tablet    Empagliflozin-metFORMIN HCl (Synjardy) 12 5-1000 MG TABS    fenofibrate (TRICOR) 54 MG tablet    FREESTYLE LITE test strip    furosemide (LASIX) 40 mg tablet    loratadine (CLARITIN) 10 mg tablet    metoprolol succinate (TOPROL-XL) 100 mg 24 hr tablet    omega-3-acid ethyl esters (LOVAZA) 1 g capsule    promethazine-dextromethorphan (PHENERGAN-DM) 6 25-15 mg/5 mL oral syrup    ticagrelor (BRILINTA) 90 MG    Trulicity 4 5 AQ/7 1FF SOPN    lisinopril (ZESTRIL) 10 mg tablet    Allergies   Allergen Reactions    Iodinated Diagnostic Agents Facial Swelling           Objective     Blood pressure 116/80, pulse 86, height 5' 8" (1 727 m), weight 95 kg (209 lb 6 4 oz), SpO2 99 %  Body mass index is 31 84 kg/m²  PHYSICAL EXAM:      General Appearance:   Alert, cooperative, no distress   HEENT:   Normocephalic, atraumatic, anicteric      Neck:  Supple, symmetrical, trachea midline   Lungs:   Clear to auscultation bilaterally; no rales, rhonchi or wheezing; respirations unlabored    Heart[de-identified]   Regular rate and rhythm; no murmur, rub, or gallop     Abdomen:   Soft, non-tender, non-distended; normal bowel sounds; no masses, no organomegaly    Genitalia:   Deferred    Rectal:   Deferred  Extremities:  No cyanosis, clubbing or edema    Pulses:  2+ and symmetric    Skin:  No jaundice, rashes, or lesions    Lymph nodes:  No palpable cervical lymphadenopathy        Lab Results:   No visits with results within 1 Day(s) from this visit  Latest known visit with results is:   Hospital Outpatient Visit on 02/04/2022   Component Date Value    Baseline HR 02/04/2022 81     Baseline BP 02/04/2022 118/70     O2 sat rest 02/04/2022 99     Stress peak HR 02/04/2022 173     Post peak BP 02/04/2022 182     Rate Pressure Product 02/04/2022 31,486 0     O2 sat peak 02/04/2022 98     Recovery HR 02/04/2022 109     Recovery BP 02/04/2022 118/72     O2 sat recovery 02/04/2022 99     Target HR 02/04/2022 173     Percent HR 02/04/2022 109     Exercise duration (min) 02/04/2022 9     Exercise duration (sec) 02/04/2022 30     Estimated workload 02/04/2022 10 9     Angina Index 02/04/2022 0     Stress Stage Reached 02/04/2022 4 0     Max HR Percent 02/04/2022 173     Max HR 02/04/2022 158     Rest Nuclear Isotope Dose 02/04/2022 10 90     Stress Nuclear Isotope D* 02/04/2022 33 00     Duke Treadmill Score 02/04/2022 10     Base ST Depresion (mm) 02/04/2022 0     ST Depression (mm) 02/04/2022 0     Stress/rest perfusion ra* 02/04/2022 0 89     EF (%) 02/04/2022 67     Protocol Name 02/04/2022 NI     Time In Exercise Phase 02/04/2022 00:09:30     MAX  SYSTOLIC BP 26/59/8965 556     Max Diastolic Bp 57/79/7228 90     Max Heart Rate 02/04/2022 173     Max Predicted Heart Rate 02/04/2022 158     Reason for Termination 02/04/2022 Target Heart Rate Achieved     Test Indication 02/04/2022 FAA RENEWAL, CAD, AFIB     Target Hr Formular 02/04/2022 (220 - Age)*100%     Chest Pain Statement 02/04/2022 none          Radiology Results:   No results found

## 2022-08-05 ENCOUNTER — HOSPITAL ENCOUNTER (OUTPATIENT)
Dept: NON INVASIVE DIAGNOSTICS | Facility: CLINIC | Age: 62
Discharge: HOME | End: 2022-08-05
Payer: COMMERCIAL

## 2022-08-05 DIAGNOSIS — I48.19 PERSISTENT ATRIAL FIBRILLATION (HCC): ICD-10-CM

## 2022-08-05 PROCEDURE — 93225 XTRNL ECG REC<48 HRS REC: CPT

## 2022-08-05 PROCEDURE — 93226 XTRNL ECG REC<48 HR SCAN A/R: CPT

## 2022-08-08 ENCOUNTER — TELEPHONE (OUTPATIENT)
Dept: CARDIOLOGY CLINIC | Facility: CLINIC | Age: 62
End: 2022-08-08

## 2022-08-08 PROCEDURE — 93227 XTRNL ECG REC<48 HR R&I: CPT | Performed by: INTERNAL MEDICINE

## 2022-08-08 NOTE — TELEPHONE ENCOUNTER
Patient is calling for the results of his holter monitor to complete his forms for his  certificate   Please advise

## 2022-08-15 NOTE — TELEPHONE ENCOUNTER
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

## 2022-08-15 NOTE — TELEPHONE ENCOUNTER
His Holter monitor overall look fine  He has known long term persistent atrial fibrillation  Heart rates were well controlled with an average around 80  He also inquired with me about having cardiac catheterization performed which was confusing to me  He stated the FAA was requesting this, but I have not heard of this before  Did he have this resolved?   Thank you

## 2022-08-18 NOTE — TELEPHONE ENCOUNTER
I am Hugo reader on 09/19  We could add him on in the afternoon but but with this allowing of timing for him since it is the day before? ?  Thank you

## 2022-08-24 ENCOUNTER — TELEPHONE (OUTPATIENT)
Dept: OTHER | Facility: OTHER | Age: 62
End: 2022-08-24

## 2022-08-25 ENCOUNTER — OFFICE VISIT (OUTPATIENT)
Dept: CARDIOLOGY CLINIC | Facility: CLINIC | Age: 62
End: 2022-08-25
Payer: COMMERCIAL

## 2022-08-25 ENCOUNTER — TELEPHONE (OUTPATIENT)
Dept: CARDIOLOGY CLINIC | Facility: CLINIC | Age: 62
End: 2022-08-25

## 2022-08-25 VITALS
DIASTOLIC BLOOD PRESSURE: 72 MMHG | HEART RATE: 86 BPM | WEIGHT: 206.8 LBS | BODY MASS INDEX: 31.44 KG/M2 | SYSTOLIC BLOOD PRESSURE: 110 MMHG

## 2022-08-25 DIAGNOSIS — I25.10 CORONARY ARTERY DISEASE INVOLVING NATIVE CORONARY ARTERY OF NATIVE HEART WITHOUT ANGINA PECTORIS: Primary | ICD-10-CM

## 2022-08-25 DIAGNOSIS — Z02.89 ENCOUNTER FOR FEDERAL AVIATION ADMINISTRATION (FAA) EXAMINATION: ICD-10-CM

## 2022-08-25 DIAGNOSIS — I48.19 PERSISTENT ATRIAL FIBRILLATION (HCC): Primary | ICD-10-CM

## 2022-08-25 DIAGNOSIS — I48.19 PERSISTENT ATRIAL FIBRILLATION (HCC): ICD-10-CM

## 2022-08-25 DIAGNOSIS — I25.5 ISCHEMIC CARDIOMYOPATHY: ICD-10-CM

## 2022-08-25 DIAGNOSIS — Z95.5 STATUS POST INSERTION OF DRUG-ELUTING STENT INTO LEFT ANTERIOR DESCENDING (LAD) ARTERY: ICD-10-CM

## 2022-08-25 PROCEDURE — 99215 OFFICE O/P EST HI 40 MIN: CPT | Performed by: INTERNAL MEDICINE

## 2022-08-25 PROCEDURE — 93000 ELECTROCARDIOGRAM COMPLETE: CPT | Performed by: INTERNAL MEDICINE

## 2022-08-25 NOTE — TELEPHONE ENCOUNTER
Patient scheduled for LHC on 9/7/22 in [facility] with Dr Jez Carbajal  Instructions sent to patient through [address]  Medication hold Tulicity, Synjardi, Eliquis, and Lasix  Can I have auth?

## 2022-08-25 NOTE — PROGRESS NOTES
Cardiology Follow Up    Isha Hyatt  1960  5 [facility] [place]  One [facility]  [address]  [phone]    1  Coronary artery disease involving native coronary artery of native heart without angina pectoris  Echo complete w/ contrast if indicated    Case Request Cath Lab: Cardiac catheterization    Case Request Cath Lab: Cardiac catheterization   2  Persistent atrial fibrillation (HCC)  POCT ECG   3  Ischemic cardiomyopathy  Echo complete w/ contrast if indicated   4  Status post insertion of drug-eluting stent into left anterior descending (LAD) artery     5  Encounter for [employer] [facility]) examination  Echo complete w/ contrast if indicated    Case Request Cath Lab: Cardiac catheterization    Case Request Cath Lab: Cardiac catheterization       Discussion/Summary:    1  CAD - Thor Lo was found to have significant left main and LAD disease back in October 2020  After consultation with CT surgery and Interventional Cardiology, PCI was performed of the distal left main and proximal to mid LAD  This went well without complications  He is without symptoms of angina  I drop the aspirin to avoid triple therapy  He will remain on Brilinta, and at this point we could reduce the dose to 60 mg twice daily  I would like to extend Brilinta out at least 3 years  2   Persistent atrial fibrillation - By records it appears that he has been in this over the last couple years, and has been rate controlled  His heart rates are controlled and we did discontinue diltiazem  We also attempted a rhythm control strategy last year and a cardioversion was successful, but only transiently  His heart rates remain well controlled and he is asymptomatic and therefore we will continue a rate control strategy  He is on metoprolol for rate control and is on Eliquis for stroke prevention   We discussed both a rate and rhythm control strategy  3   Ischemic cardiomyopathy - His EF was mildly reduced to 45%  Because of his shortness of breath Lasix was added to his regimen at his hospitalization  He shows no signs of CHF and is active without limitations and we had since decreased Lasix to 20 mg daily  He should follow a low-sodium diet  His ejection fraction was improved by nuclear stress testing, we did order an updated echocardiogram today  4   Hyperlipidemia - He will remain on high-intensity statin therapy  We will continue to follow blood work closely  5   Obstructive sleep apnea -  He is compliant on CPAP  6   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  He has not been able to fly since his coronary stenting and hospitalization  He is trying to obtain his license back  They are requesting both an updated echocardiogram and cardiac catheterization and we ordered this today  Interval History:     Mr Krupa Joes comes in for follow-up given his history of atrial fibrillation and CAD  He has had persistent atrial fibrillation from what sounds like for the last few years which was controlled on Toprol XL and he has been on Eliquis for stroke prevention  I met him during hospitalization in October 2020 in which he presented with symptoms of worsening shortness of breath and palpitations, but also had reported an episode of chest pressure [place] the airport a [place] before admission  On admission he was found to be in rapid atrial fibrillation  Echocardiogram showed a mildly reduced ejection fraction of 45% and extensive coronary calcifications were noted on CT  Because of these findings and his symptoms, he underwent a cardiac catheterization which showed significant left main and LAD disease  He was transferred down from the [facility] to [facility] for a CT surgical evaluation      In collaboration with CT surgery and Interventional Cardiology, it was felt the best option was PCI to the distal left main / LAD  Later that hospital admission he had drug-eluting stent placement x2 to the LAD and 1 to the left main  This went well without complications  He was discharged on dual anti-platelet therapy with Brilinta, and remained on Eliquis for stroke prevention  His atrial fibrillation came under control with up titration of his Toprol and adding diltiazem  His heart rates were running low and we also discontinue diltiazem  His heart rates are now under good control  After our last visit I suggest trying a rhythm control strategy and we set him up for cardioversion  This was successful but only transiently  Atrial fibrillation returned by the next follow-up  Cristel Montana is also a  and flies corporate  He has not been able to work since his hospitalization he has been trying to get his [employer] license renewed and need several steps to do this  Earlier this year he had a stress nuclear study which was essentially normal   He did not have any ischemia and his ejection fraction showed improvement  He wore a Holter monitor as well that showed controlled atrial fibrillation  They are still requesting both an echocardiogram and coronary angiography  Cristel Montana has felt well since last visit  He is asymptomatic from a cardiac standpoint  He denies any palpitations or lightheadedness  He does not feel his atrial fibrillation  No syncope  He denies chest pains or any symptoms of angina  No shortness of breath or any signs/symptoms of CHF        Patient Active Problem List   Diagnosis    Epidural lipomatosis    Lower back pain    Lumbar radiculopathy    BMI 39 0-39 9,adult    Chronic pain syndrome    Lumbar stenosis with neurogenic claudication    Spondylolisthesis of lumbar region    Spinal instability, lumbar    PAD (peripheral artery disease) ([address] )    Sleep apnea    Persistent atrial fibrillation (HCC)    Type 2 diabetes mellitus, without long-term current use of insulin ([facility])    Atrial fibrillation ([place] [address] )    Cardiomyopathy ([place] [address] )    Coronary artery disease    Asymptomatic stenosis of left carotid artery    Status post insertion of drug-eluting stent into left anterior descending (LAD) artery    Lower abdominal pain     Past Medical History:   Diagnosis Date    Atrial fibrillation ([facility])     Back pain     CPAP (continuous positive airway pressure) dependence     Diabetes mellitus ([place] [address] )     Hyperlipidemia     Hypertension     PAD (peripheral artery disease) ([facility])     Persistent atrial fibrillation ([place] [address] )     Sleep apnea     Spinal stenosis      Social History     Socioeconomic History    Marital status: /Civil Union     Spouse name: Not on file    Number of children: Not on file    Years of education: Not on file    Highest education level: Not on file   Occupational History    Not on file   Tobacco Use    Smoking status: Former Smoker    Smokeless tobacco: Never Used   Vaping Use    Vaping Use: Never used   Substance and Sexual Activity    Alcohol use: Yes     Comment: Social     Drug use: Never    Sexual activity: Yes     Partners: Female   Other Topics Concern    Not on file   Social History Narrative    Not on file     Social Determinants of Health     Financial Resource Strain: Not on file   Food Insecurity: Not on file   Transportation Needs: Not on file   Physical Activity: Not on file   Stress: Not on file   Social Connections: Not on file   Intimate Partner Violence: Not on file   Housing Stability: Not on file      Family History   Problem Relation Age of Onset    Diabetes Mother     Alzheimer's disease Mother     Cancer Sister     Aneurysm Father      Past Surgical History:   Procedure Laterality Date    CORONARY ANGIOPLASTY WITH STENT PLACEMENT      IR ABDOMINAL AORTAGRAM  12/1/2020       Current Outpatient Medications:     apixaban (ELIQUIS) 5 mg, Take 5 mg by mouth 2 (two) times a day , Disp: , Rfl:     atorvastatin (LIPITOR) 80 mg tablet, Take 1 tablet (80 mg total) by mouth daily at bedtime, Disp: 30 tablet, Rfl: 0    Empagliflozin-metFORMIN HCl (Synjardy) 12 5-1000 MG TABS, Take 1 tablet by mouth, Disp: , Rfl:     fenofibrate (TRICOR) 54 MG tablet, , Disp: , Rfl:     FREESTYLE LITE test strip, , Disp: , Rfl:     furosemide (LASIX) 40 mg tablet, Take 0 5 tablets (20 mg total) by mouth daily, Disp: , Rfl: 0    lisinopril (ZESTRIL) 10 mg tablet, Take 10 mg by mouth daily, Disp: , Rfl:     metoprolol succinate (TOPROL-XL) 100 mg 24 hr tablet, Take 1 tablet (100 mg total) by mouth daily, Disp: 60 tablet, Rfl: 0    omega-3-acid ethyl esters (LOVAZA) 1 g capsule, Take 2 g by mouth 2 (two) times a day , Disp: , Rfl:     ticagrelor (BRILINTA) 90 MG, Take 1 tablet (90 mg total) by mouth every 12 (twelve) hours, Disp: 60 tablet, Rfl: 0    Trulicity 4 5 OJ/2 8AY SOPN, , Disp: , Rfl:     acetaminophen (TYLENOL) 325 mg tablet, Take 650 mg by mouth every 6 (six) hours as needed for mild pain , Disp: , Rfl:     loratadine (CLARITIN) 10 mg tablet, Take 10 mg by mouth daily , Disp: , Rfl:     promethazine-dextromethorphan (PHENERGAN-DM) 6 25-15 mg/5 mL oral syrup, , Disp: , Rfl:   Allergies   Allergen Reactions    Iodinated Diagnostic Agents Facial Swelling       Labs:  Lab Results   Component Value Date    K 3 6 10/09/2021     10/09/2021    CO2 26 10/09/2021    BUN 11 10/09/2021    CREATININE 0 99 10/09/2021    CALCIUM 9 9 10/09/2021     Lab Results   Component Value Date    WBC 10 15 10/09/2021    HGB 15 8 10/09/2021    HCT 48 4 10/09/2021    MCV 97 10/09/2021     10/09/2021     Lab Results   Component Value Date    TRIG 214 (H) 10/29/2020    HDL 45 10/29/2020     Imaging:  ECG today shows atrial fibrillation  STRESS NUCLEAR (2/4/22):   Stress ECG: No ST deviation is noted      Perfusion: There is a left ventricular perfusion defect that is small to medium in size with mild reduction in uptake present in the basal inferior location(s) that is partially reversible that resolves with prone imaging  The defect appears to be probable artifact caused by diaphragmatic activity    Stress Function: Left ventricular function post-stress is normal  Post-stress ejection fraction is 67 %    Stress Combined Conclusion: The ECG and SPECT imaging portions of the stress study are concordant with no evidence of stress induced myocardial ischemia  There is image artifact, without diagnostic evidence for perfusion abnormality      Normal exercise nuclear stress test without ischemia or scar  Normal left ventricular ejection fraction calculated at 67%           CARDIAC PCI (10/30/2020):  1ST LESION INTERVENTIONS:  Following pre-dilation, a Resolute Pj Rx 2 5 x 12mm drug-eluting stent was placed across the 95% lesion in mid LAD and deployed at a maximum inflation pressure of 12 mikie  There was 0% residual stenosis  JOSE flow remained grade 3      2ND LESION INTERVENTIONS:  Following pre-dilation and under GuideLiner support, a Xience [place] Rx 3 0 x 38mm drug-eluting stent was positioned to overlap with the stent in mid vessel, while covering a 70% proximal stenosis, and deployed at a maximum inflation  pressure of 18 mikie  There was full stent expansion and apposition by IVUS, with 0% residual stenosis and normal JOSE 3 flow      3RD LESION INTERVENTIONS:  Following pre-dilation and IVUS interrogation, a Xience [place] Rx 3 5 x 23mm drug-eluting stent was placed across the 70% lesion in the left main artery and deployed at a maximum inflation pressure of 18 mikie  There was 0% residual  stenosis  IVUS disclosed full stent expansion and apposition, with minimal luminal cross section area increased from 4 2 to 9 3 sq mm  JOSE flow remained grade 3      Summary:  Successful IVUS-guided unprotected left main and LAD PCI        CARDIAC CATH (11/27/20):  CORONARY CIRCULATION:  The coronary circulation is right dominant  Left main: The vessel was large sized and moderately calcified  There is 40-50% calcified stenosis in mid distal LM  IVUS showed MLA 5 3  LAD: The vessel was medium to large sized and moderately calcified  Proximal LAD has 50% stenosis  Mid LAD is diffusely disease  Immediately after D1 there is 70% stenosis followed by 90% stenosis  Distal LAD has luminal irregualirities  D1 is medium to large caliber long vessel and normal  Circumflex: The vessel was medium sized and moderately calcified  Diffusely disease with maximum stenosis of 50% in mid portion  OM1 is small vessel with proximal 80% focal stenosis  RCA: The vessel was large sized (dominant) and mildly calcified  Proxmial RCA has focal 50% stenosis s/p negative iFR 0 95  Mid RCA has 30% stenosis  RPDA is medium sized vessel with 50% stenosis in mid     CARDIAC STRUCTURES:  There was no aortic stenosis      HEMODYNAMICS:  Hemodynamic assessment demonstrated moderately elevated LVEDP      1ST LESION INTERVENTIONS:  Steady baseline values were obtained  Mean arterial pressure and mean distal coronary pressures were then obtained at maximum hyperemia  Based on the results of this study, the lesion was judged to be non-significant and no intervention  was performed  Proximal RCA: verrata pressure wire was used  IFR 0 95 which is not significant     2ND LESION INTERVENTIONS:  IVUS of LM done: mid distal LM MLA 5 3     INDICATIONS:  Coronary artery disease: suspected CAD  Congestive heart failure with cardiomyopathy  Cardiac: arrhythmia      IMPRESSIONS:  Mid - distal LM calcified stenosis 40-50%, IVUS MLA 5 3  Mid LAD calcified stenosis (70% stenosis followed by 90%)  Moderate disease of mid LCX with maximum of 50% stenosis  Proximal RCA 50% stenosis s/p negative iFR, RPDA 50% stenosis in mid      ECHO (10/25/2020):  LEFT VENTRICLE:  Systolic function was mildly reduced  Ejection fraction was estimated to be 45 %    There was mild diffuse hypokinesis  Wall thickness was mildly increased  There was mild concentric hypertrophy  Features were consistent with a pseudonormal left ventricular filling pattern, with concomitant abnormal relaxation and increased filling pressure (grade 2 diastolic dysfunction)      MITRAL VALVE:  There was trace regurgitation      AORTIC VALVE:  There was trace regurgitation      TRICUSPID VALVE:  There was trace to mild regurgitation  Pulmonary artery systolic pressure was within the normal range      AORTA:  The root exhibited mild dilatation - 3 8 cm  Review of Systems:  Review of Systems   Constitutional: Negative  HENT: Negative  Eyes: Negative  Respiratory: Negative  Cardiovascular: Negative  Gastrointestinal: Negative  Musculoskeletal: Negative  Skin: Negative  Allergic/Immunologic: Negative  Neurological: Negative  Hematological: Negative  Psychiatric/Behavioral: Negative  All other systems reviewed and are negative  Vitals:    08/25/22 0911   BP: 110/72   BP Location: Left arm   Patient Position: Sitting   Cuff Size: Standard   Pulse: 86   Weight: 93 8 kg (206 lb 12 8 oz)       Physical Exam:  Physical Exam  Vitals and nursing note reviewed  Constitutional:       Appearance: He is well-developed  HENT:      Head: Normocephalic and atraumatic  Eyes:      General: No scleral icterus  Right eye: No discharge  Left eye: No discharge  Pupils: Pupils are equal, round, and reactive to light  Neck:      Thyroid: No thyromegaly  Vascular: No JVD  Cardiovascular:      Rate and Rhythm: Normal rate and regular rhythm  No extrasystoles are present  Pulses: Normal pulses  No decreased pulses  Heart sounds: Normal heart sounds, S1 normal and S2 normal  No murmur heard  No friction rub  No gallop  Pulmonary:      Effort: Pulmonary effort is normal  No respiratory distress  Breath sounds: Normal breath sounds  No wheezing or rales  Abdominal:      General: Bowel sounds are normal  There is no distension  Palpations: Abdomen is soft  Tenderness: There is no abdominal tenderness  Musculoskeletal:         General: No tenderness or deformity  Normal range of motion  Cervical back: Normal range of motion and neck supple  Skin:     General: Skin is warm and dry  Findings: No rash  Neurological:      Mental Status: He is alert and oriented to person, place, and time  Cranial Nerves: No cranial nerve deficit  Psychiatric:         Thought Content: Thought content normal          Judgment: Judgment normal      Counseling / Coordination of Care  Total office time spent today 30 minutes  Greater than 50% of total time was spent with the patient and / or family counseling and / or coordination of care

## 2022-09-01 ENCOUNTER — TELEPHONE (OUTPATIENT)
Dept: CARDIOLOGY CLINIC | Facility: CLINIC | Age: 62
End: 2022-09-01

## 2022-09-02 ENCOUNTER — TRANSCRIBE ORDERS (OUTPATIENT)
Dept: LAB | Facility: CLINIC | Age: 62
End: 2022-09-02

## 2022-09-02 ENCOUNTER — APPOINTMENT (OUTPATIENT)
Dept: LAB | Facility: CLINIC | Age: 62
End: 2022-09-02
Payer: COMMERCIAL

## 2022-09-02 ENCOUNTER — HOSPITAL ENCOUNTER (OUTPATIENT)
Dept: NON INVASIVE DIAGNOSTICS | Facility: CLINIC | Age: 62
Discharge: HOME | End: 2022-09-02
Payer: COMMERCIAL

## 2022-09-02 VITALS
HEART RATE: 80 BPM | BODY MASS INDEX: 31.22 KG/M2 | DIASTOLIC BLOOD PRESSURE: 72 MMHG | SYSTOLIC BLOOD PRESSURE: 110 MMHG | WEIGHT: 206 LBS | HEIGHT: 68 IN

## 2022-09-02 DIAGNOSIS — Z95.5 STENTED CORONARY ARTERY: ICD-10-CM

## 2022-09-02 DIAGNOSIS — E11.9 DIABETES MELLITUS WITHOUT COMPLICATION (HCC): ICD-10-CM

## 2022-09-02 DIAGNOSIS — E11.59 OBESITY, DIABETES, AND HYPERTENSION SYNDROME (HCC): ICD-10-CM

## 2022-09-02 DIAGNOSIS — E66.9 OBESITY, DIABETES, AND HYPERTENSION SYNDROME (HCC): ICD-10-CM

## 2022-09-02 DIAGNOSIS — I25.10 CORONARY ARTERY DISEASE INVOLVING NATIVE CORONARY ARTERY OF NATIVE HEART WITHOUT ANGINA PECTORIS: ICD-10-CM

## 2022-09-02 DIAGNOSIS — I15.2 OBESITY, DIABETES, AND HYPERTENSION SYNDROME (HCC): ICD-10-CM

## 2022-09-02 DIAGNOSIS — I48.19 PERSISTENT ATRIAL FIBRILLATION (HCC): ICD-10-CM

## 2022-09-02 DIAGNOSIS — E11.69 DIABETES MELLITUS ASSOCIATED WITH HORMONAL ETIOLOGY (HCC): ICD-10-CM

## 2022-09-02 DIAGNOSIS — I15.2 ENDOCRINE HYPERTENSION: ICD-10-CM

## 2022-09-02 DIAGNOSIS — Z02.89 ENCOUNTER FOR FEDERAL AVIATION ADMINISTRATION (FAA) EXAMINATION: ICD-10-CM

## 2022-09-02 DIAGNOSIS — E78.5 HYPERLIPEMIA, IDIOPATHIC FAMILIAL: ICD-10-CM

## 2022-09-02 DIAGNOSIS — I25.5 ISCHEMIC CARDIOMYOPATHY: ICD-10-CM

## 2022-09-02 DIAGNOSIS — E11.69 OBESITY, DIABETES, AND HYPERTENSION SYNDROME (HCC): ICD-10-CM

## 2022-09-02 DIAGNOSIS — E11.69 DIABETES MELLITUS ASSOCIATED WITH HORMONAL ETIOLOGY (HCC): Primary | ICD-10-CM

## 2022-09-02 LAB
ALBUMIN SERPL BCP-MCNC: 3.9 G/DL (ref 3.5–5)
ALP SERPL-CCNC: 61 U/L (ref 46–116)
ALT SERPL W P-5'-P-CCNC: 33 U/L (ref 12–78)
ANION GAP SERPL CALCULATED.3IONS-SCNC: 4 MMOL/L (ref 4–13)
AORTIC ROOT: 3.6 CM
APICAL FOUR CHAMBER EJECTION FRACTION: 54 %
ASCENDING AORTA: 3.5 CM
AST SERPL W P-5'-P-CCNC: 19 U/L (ref 5–45)
BASOPHILS # BLD AUTO: 0.05 THOUSANDS/ΜL (ref 0–0.1)
BASOPHILS NFR BLD AUTO: 1 % (ref 0–1)
BILIRUB SERPL-MCNC: 0.54 MG/DL (ref 0.2–1)
BUN SERPL-MCNC: 19 MG/DL (ref 5–25)
CALCIUM SERPL-MCNC: 9.3 MG/DL (ref 8.3–10.1)
CHLORIDE SERPL-SCNC: 107 MMOL/L (ref 96–108)
CHOLEST SERPL-MCNC: 137 MG/DL
CO2 SERPL-SCNC: 29 MMOL/L (ref 21–32)
CREAT SERPL-MCNC: 1.19 MG/DL (ref 0.6–1.3)
CREAT UR-MCNC: 26.7 MG/DL
EOSINOPHIL # BLD AUTO: 0.13 THOUSAND/ΜL (ref 0–0.61)
EOSINOPHIL NFR BLD AUTO: 2 % (ref 0–6)
ERYTHROCYTE [DISTWIDTH] IN BLOOD BY AUTOMATED COUNT: 14.1 % (ref 11.6–15.1)
EST. AVERAGE GLUCOSE BLD GHB EST-MCNC: 151 MG/DL
FRACTIONAL SHORTENING: 26 % (ref 28–44)
GFR SERPL CREATININE-BSD FRML MDRD: 64 ML/MIN/1.73SQ M
GLUCOSE P FAST SERPL-MCNC: 161 MG/DL (ref 65–99)
HBA1C MFR BLD: 6.9 %
HCT VFR BLD AUTO: 49.2 % (ref 36.5–49.3)
HDLC SERPL-MCNC: 64 MG/DL
HGB BLD-MCNC: 16.4 G/DL (ref 12–17)
IMM GRANULOCYTES # BLD AUTO: 0.02 THOUSAND/UL (ref 0–0.2)
IMM GRANULOCYTES NFR BLD AUTO: 0 % (ref 0–2)
INTERVENTRICULAR SEPTUM IN DIASTOLE (PARASTERNAL SHORT AXIS VIEW): 1.1 CM
INTERVENTRICULAR SEPTUM: 1.1 CM (ref 0.6–1.1)
LAAS-AP2: 17.9 CM2
LAAS-AP4: 19.6 CM2
LDLC SERPL CALC-MCNC: 53 MG/DL (ref 0–100)
LEFT ATRIUM AREA SYSTOLE SINGLE PLANE A4C: 20 CM2
LEFT ATRIUM SIZE: 4.1 CM
LEFT INTERNAL DIMENSION IN SYSTOLE: 3.5 CM (ref 2.1–4)
LEFT VENTRICLE DIASTOLIC VOLUME (MOD BIPLANE): 92 ML
LEFT VENTRICLE SYSTOLIC VOLUME (MOD BIPLANE): 46 ML
LEFT VENTRICULAR INTERNAL DIMENSION IN DIASTOLE: 4.7 CM (ref 3.5–6)
LEFT VENTRICULAR POSTERIOR WALL IN END DIASTOLE: 1 CM
LEFT VENTRICULAR STROKE VOLUME: 50 ML
LV EF: 50 %
LVSV (TEICH): 50 ML
LYMPHOCYTES # BLD AUTO: 2.97 THOUSANDS/ΜL (ref 0.6–4.47)
LYMPHOCYTES NFR BLD AUTO: 35 % (ref 14–44)
MCH RBC QN AUTO: 31.8 PG (ref 26.8–34.3)
MCHC RBC AUTO-ENTMCNC: 33.3 G/DL (ref 31.4–37.4)
MCV RBC AUTO: 96 FL (ref 82–98)
MICROALBUMIN UR-MCNC: 5.4 MG/L (ref 0–20)
MICROALBUMIN/CREAT 24H UR: 20 MG/G CREATININE (ref 0–30)
MONOCYTES # BLD AUTO: 0.86 THOUSAND/ΜL (ref 0.17–1.22)
MONOCYTES NFR BLD AUTO: 10 % (ref 4–12)
NEUTROPHILS # BLD AUTO: 4.58 THOUSANDS/ΜL (ref 1.85–7.62)
NEUTS SEG NFR BLD AUTO: 52 % (ref 43–75)
NONHDLC SERPL-MCNC: 73 MG/DL
NRBC BLD AUTO-RTO: 0 /100 WBCS
PLATELET # BLD AUTO: 209 THOUSANDS/UL (ref 149–390)
PMV BLD AUTO: 11.1 FL (ref 8.9–12.7)
POTASSIUM SERPL-SCNC: 4.8 MMOL/L (ref 3.5–5.3)
PROT SERPL-MCNC: 7.7 G/DL (ref 6.4–8.4)
RA PRESSURE ESTIMATED: 8 MMHG
RBC # BLD AUTO: 5.15 MILLION/UL (ref 3.88–5.62)
RIGHT ATRIUM AREA SYSTOLE A4C: 15.5 CM2
RIGHT VENTRICLE ID DIMENSION: 3.2 CM
RV PSP: 29 MMHG
SL CV LEFT ATRIUM LENGTH A2C: 5.1 CM
SL CV LV EF: 55
SL CV PED ECHO LEFT VENTRICLE DIASTOLIC VOLUME (MOD BIPLANE) 2D: 102 ML
SL CV PED ECHO LEFT VENTRICLE SYSTOLIC VOLUME (MOD BIPLANE) 2D: 52 ML
SODIUM SERPL-SCNC: 140 MMOL/L (ref 135–147)
TR MAX PG: 21 MMHG
TR PEAK VELOCITY: 2.3 M/S
TRICUSPID VALVE PEAK REGURGITATION VELOCITY: 2.29 M/S
TRIGL SERPL-MCNC: 98 MG/DL
TSH SERPL DL<=0.05 MIU/L-ACNC: 1.62 UIU/ML (ref 0.45–4.5)
WBC # BLD AUTO: 8.61 THOUSAND/UL (ref 4.31–10.16)

## 2022-09-02 PROCEDURE — 83036 HEMOGLOBIN GLYCOSYLATED A1C: CPT

## 2022-09-02 PROCEDURE — 82043 UR ALBUMIN QUANTITATIVE: CPT

## 2022-09-02 PROCEDURE — 36415 COLL VENOUS BLD VENIPUNCTURE: CPT

## 2022-09-02 PROCEDURE — 80053 COMPREHEN METABOLIC PANEL: CPT

## 2022-09-02 PROCEDURE — 93306 TTE W/DOPPLER COMPLETE: CPT

## 2022-09-02 PROCEDURE — 84443 ASSAY THYROID STIM HORMONE: CPT

## 2022-09-02 PROCEDURE — 85025 COMPLETE CBC W/AUTO DIFF WBC: CPT

## 2022-09-02 PROCEDURE — 93306 TTE W/DOPPLER COMPLETE: CPT | Performed by: INTERNAL MEDICINE

## 2022-09-02 PROCEDURE — 3061F NEG MICROALBUMINURIA REV: CPT | Performed by: INTERNAL MEDICINE

## 2022-09-02 PROCEDURE — 80061 LIPID PANEL: CPT

## 2022-09-02 PROCEDURE — 82570 ASSAY OF URINE CREATININE: CPT

## 2022-09-02 PROCEDURE — 3044F HG A1C LEVEL LT 7.0%: CPT | Performed by: INTERNAL MEDICINE

## 2022-09-06 ENCOUNTER — TELEPHONE (OUTPATIENT)
Dept: CARDIAC SURGERY | Facility: CLINIC | Age: 62
End: 2022-09-06

## 2022-09-06 NOTE — TELEPHONE ENCOUNTER
Yes I know he needs this, but he also needs to have a cardiac catheterization as requested by the [address]  This has not been done yet from what I can see    Thank you

## 2022-09-06 NOTE — TELEPHONE ENCOUNTER
I called Laren Schaumann to get clarification  He did manage to print the test results  Now he is looking for paperwork he gave you for the [address] [place], [place] [address]milan Agrawal  The highlighted document is in the chart, he said there were papers that you needed to complete  I will check with Jennifer Gatica and Anand Hughes tomorrow when I'm in the office

## 2022-09-07 ENCOUNTER — HOSPITAL ENCOUNTER (OUTPATIENT)
Facility: HOSPITAL | Age: 62
Setting detail: OUTPATIENT SURGERY
Discharge: HOME | End: 2022-09-07
Attending: INTERNAL MEDICINE | Admitting: INTERNAL MEDICINE
Payer: COMMERCIAL

## 2022-09-07 VITALS
SYSTOLIC BLOOD PRESSURE: 110 MMHG | RESPIRATION RATE: 15 BRPM | DIASTOLIC BLOOD PRESSURE: 68 MMHG | HEIGHT: 68 IN | TEMPERATURE: 97.9 F | OXYGEN SATURATION: 98 % | WEIGHT: 204 LBS | HEART RATE: 88 BPM | BODY MASS INDEX: 30.92 KG/M2

## 2022-09-07 DIAGNOSIS — I25.10 CORONARY ARTERY DISEASE INVOLVING NATIVE CORONARY ARTERY OF NATIVE HEART WITHOUT ANGINA PECTORIS: ICD-10-CM

## 2022-09-07 DIAGNOSIS — Z02.89 ENCOUNTER FOR FEDERAL AVIATION ADMINISTRATION (FAA) EXAMINATION: ICD-10-CM

## 2022-09-07 PROCEDURE — 99152 MOD SED SAME PHYS/QHP 5/>YRS: CPT | Performed by: INTERNAL MEDICINE

## 2022-09-07 PROCEDURE — 93454 CORONARY ARTERY ANGIO S&I: CPT | Performed by: INTERNAL MEDICINE

## 2022-09-07 PROCEDURE — NC001 PR NO CHARGE: Performed by: INTERNAL MEDICINE

## 2022-09-07 PROCEDURE — 99153 MOD SED SAME PHYS/QHP EA: CPT | Performed by: INTERNAL MEDICINE

## 2022-09-07 PROCEDURE — C1894 INTRO/SHEATH, NON-LASER: HCPCS | Performed by: INTERNAL MEDICINE

## 2022-09-07 PROCEDURE — C1769 GUIDE WIRE: HCPCS | Performed by: INTERNAL MEDICINE

## 2022-09-07 RX ORDER — MIDAZOLAM HYDROCHLORIDE 2 MG/2ML
INJECTION, SOLUTION INTRAMUSCULAR; INTRAVENOUS AS NEEDED
Status: DISCONTINUED | OUTPATIENT
Start: 2022-09-07 | End: 2022-09-07 | Stop reason: HOSPADM

## 2022-09-07 RX ORDER — SODIUM CHLORIDE 9 MG/ML
125 INJECTION, SOLUTION INTRAVENOUS CONTINUOUS
Status: DISCONTINUED | OUTPATIENT
Start: 2022-09-07 | End: 2022-09-07 | Stop reason: HOSPADM

## 2022-09-07 RX ORDER — SODIUM CHLORIDE 9 MG/ML
125 INJECTION, SOLUTION INTRAVENOUS CONTINUOUS
Status: DISCONTINUED | OUTPATIENT
Start: 2022-09-07 | End: 2022-09-07

## 2022-09-07 RX ORDER — ACETAMINOPHEN 325 MG/1
650 TABLET ORAL EVERY 4 HOURS PRN
Status: DISCONTINUED | OUTPATIENT
Start: 2022-09-07 | End: 2022-09-07 | Stop reason: HOSPADM

## 2022-09-07 RX ORDER — NITROGLYCERIN 20 MG/100ML
INJECTION INTRAVENOUS AS NEEDED
Status: DISCONTINUED | OUTPATIENT
Start: 2022-09-07 | End: 2022-09-07 | Stop reason: HOSPADM

## 2022-09-07 RX ORDER — ASPIRIN 81 MG/1
324 TABLET, CHEWABLE ORAL ONCE
Status: COMPLETED | OUTPATIENT
Start: 2022-09-07 | End: 2022-09-07

## 2022-09-07 RX ORDER — HEPARIN SODIUM 1000 [USP'U]/ML
INJECTION, SOLUTION INTRAVENOUS; SUBCUTANEOUS AS NEEDED
Status: DISCONTINUED | OUTPATIENT
Start: 2022-09-07 | End: 2022-09-07 | Stop reason: HOSPADM

## 2022-09-07 RX ORDER — ONDANSETRON 2 MG/ML
4 INJECTION INTRAMUSCULAR; INTRAVENOUS EVERY 6 HOURS PRN
Status: DISCONTINUED | OUTPATIENT
Start: 2022-09-07 | End: 2022-09-07 | Stop reason: HOSPADM

## 2022-09-07 RX ORDER — FENTANYL CITRATE 50 UG/ML
INJECTION, SOLUTION INTRAMUSCULAR; INTRAVENOUS AS NEEDED
Status: DISCONTINUED | OUTPATIENT
Start: 2022-09-07 | End: 2022-09-07 | Stop reason: HOSPADM

## 2022-09-07 RX ORDER — LIDOCAINE HYDROCHLORIDE 10 MG/ML
INJECTION, SOLUTION EPIDURAL; INFILTRATION; INTRACAUDAL; PERINEURAL AS NEEDED
Status: DISCONTINUED | OUTPATIENT
Start: 2022-09-07 | End: 2022-09-07 | Stop reason: HOSPADM

## 2022-09-07 RX ORDER — VERAPAMIL HCL 2.5 MG/ML
AMPUL (ML) INTRAVENOUS AS NEEDED
Status: DISCONTINUED | OUTPATIENT
Start: 2022-09-07 | End: 2022-09-07 | Stop reason: HOSPADM

## 2022-09-07 RX ADMIN — SODIUM CHLORIDE 125 ML/HR: 0.9 INJECTION, SOLUTION INTRAVENOUS at 08:56

## 2022-09-07 RX ADMIN — ASPIRIN 324 MG: 81 TABLET, CHEWABLE ORAL at 08:57

## 2022-09-07 NOTE — Clinical Note
Post procedure MD Otilia spoke with Tj Horton and findings discussed  Without complaints fully awake cooperative and appropriate  Assisted off table and readied for transfer

## 2022-09-07 NOTE — Clinical Note
Pleasant cooperative and appropriate  Aware of planned procedure and asking questions freely  Speech clear  Spoke with Ximena Castellanos MD and informed consent obtained for procedure

## 2022-09-07 NOTE — PROGRESS NOTES
Transferred to [facility] with nurse post procedure  Received by Misael Pelayo and care assumed  OP site assessed and remains stable  Safety addressed before leaving bedside

## 2022-09-15 ENCOUNTER — DOCUMENTATION (OUTPATIENT)
Dept: CARDIOLOGY CLINIC | Facility: CLINIC | Age: 62
End: 2022-09-15

## 2022-09-15 NOTE — PROGRESS NOTES
[employer]/MEDICAL CERT DIVISION FORMS COMPLETED AND SIGNED BY DR Alexsandra Schmid  INCLUDED LAST OV NOTE, EKG, ECHO, HOLTER AND CATH REPORT  PATIENT TO  PERSONALLY AS HE MUST FED EX TO [employer]

## 2022-11-21 ENCOUNTER — HOSPITAL ENCOUNTER (OUTPATIENT)
Dept: NON INVASIVE DIAGNOSTICS | Facility: CLINIC | Age: 62
Discharge: HOME | End: 2022-11-21

## 2022-11-21 DIAGNOSIS — I73.9 PAD (PERIPHERAL ARTERY DISEASE) (HCC): ICD-10-CM

## 2023-01-04 ENCOUNTER — OFFICE VISIT (OUTPATIENT)
Dept: VASCULAR SURGERY | Facility: CLINIC | Age: 63
End: 2023-01-04

## 2023-01-04 VITALS
HEIGHT: 68 IN | BODY MASS INDEX: 32.46 KG/M2 | HEART RATE: 84 BPM | WEIGHT: 214.2 LBS | SYSTOLIC BLOOD PRESSURE: 108 MMHG | DIASTOLIC BLOOD PRESSURE: 74 MMHG

## 2023-01-04 DIAGNOSIS — I25.10 CORONARY ARTERY DISEASE INVOLVING NATIVE CORONARY ARTERY OF NATIVE HEART WITHOUT ANGINA PECTORIS: ICD-10-CM

## 2023-01-04 DIAGNOSIS — Z95.5 STATUS POST INSERTION OF DRUG-ELUTING STENT INTO LEFT ANTERIOR DESCENDING (LAD) ARTERY: ICD-10-CM

## 2023-01-04 DIAGNOSIS — I65.22 ASYMPTOMATIC STENOSIS OF LEFT CAROTID ARTERY: ICD-10-CM

## 2023-01-04 DIAGNOSIS — I48.19 PERSISTENT ATRIAL FIBRILLATION (HCC): ICD-10-CM

## 2023-01-04 DIAGNOSIS — I73.9 PAD (PERIPHERAL ARTERY DISEASE) (HCC): Primary | ICD-10-CM

## 2023-01-04 NOTE — PATIENT INSTRUCTIONS
1) PAD with claudication  -we did an angiogram procedure for the right leg and treated a blockage with a balloon and stent  -your ultrasound shows no new blockages in the arteries  -we will do another ultrasound in 12 months    2) Carotid stenosis  -your ultrasound showed mild blockage on the left  -we will repeat your ultrasound in 2 years (12/23)    3) Medications  -continue your statin  -if they take you off your brillenta, please restart aspirin 81mg once daily

## 2023-01-04 NOTE — PROGRESS NOTES
Assessment/Plan:    Pt is a 59 yo M w/ DM, YEE, asymptomatic L ICA stenosis, afib (on Eliquis), cardiomyopathy, CAD s/p PORTER x 3 10/30/20, lumbar radiculopathy, obesity, hx of EtOH abuse, presents for [facility]    PAD (peripheral artery disease) (HCC)  -     VAS lower limb arterial duplex, complete bilateral; Future  -reviewed LEADs which show R: 0 76/-/- and L: 1 2/-/- with R SFA/pop occlusion and L TP trunk stenosis  -now s/p RLE agram w/ R SFA PTA/stent 12/1/20  -RLE claudication completely resolved; also did runoff of LLE without significant treatable stenosis  -reviewed LEADs which show R: 1 08/264/88 and L: 0 99/116/65 without focal stenoses  -will repeat for surveillance in 1 year    Asymptomatic stenosis of left carotid artery  -reviewed carotid DU which shows R ICA wnl and L ICA <50% stenosis  -will continue surveillance on an every 2 year basis (already ordered, 12/23)    Type 2 diabetes mellitus without complication, without long-term current use of insulin ([facility])  -last A1C: 6 9    Persistent atrial fibrillation (HCC)  Coronary artery disease involving native coronary artery of native heart without angina pectoris  Status post insertion of drug-eluting stent into left anterior descending (LAD) artery  -stenting in Oct '20  -diagnostic cath in '22; RCA 50% stenosis    Medications  -cont brillinta (per cards)  -cont statin  -cont Eliquis (for afib)    Subjective:      Patient ID: Rivas Ovalles is a 60 y o  male  Patient present to review DARYN done on 11/21/22  Patient denies any, cramping, swelling or wounds  Patient is currently taking Atorvastatin and Eliquis  HPI:     Patient returns for [facility] for PAD and carotid disease     Patient complained of pain in the calves  This happens mostly when he is travelling for work  He sells aviation fuel  He says his legs "hurt" and "bind up"  It feels like a [employer]  Occurs in both legs, worse in the right  It occurs every time he walks    He can go only 20-30 feet without stopping  Pain resolves with rest   He also notes sciatica on both sides  Also has some burning in the posterior thighs      Now s/p RLE angiogram   No complications  RLE claudication is completely resolved  Has been walking >2-3miles daily at an incline on a treadmill  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 getting his medical clearance in order (has been doing this for over a year now)  Had a stress test for this  He also had a cath      Quit smoking in '98  Altered diet and increased exercise and lost about 50lbs in a year and a half  Denies SOB/CP  Hx of cerebral aneurysms in his father  Denies hx of CVA or stroke symptoms  Denies amaurosis, facial droop, garbled speech, unilateral weakness/numbness  Has mild B peripheral neuropathy  Taking ASA, Brillenta (since 10/30/20 coronary stents), Eliquis (afib)  Also had cath 9/22 which was diagnostic      Reports hx of facial swelling 20 yrs ago after having a head MRI with dye  Had cardiac cath 2 wks ago after pre-prep without issue  Had agram w/ prep without issue      The following portions of the patient's history were reviewed and updated as appropriate: allergies, current medications, past family history, past medical history, past social history, past surgical history and problem list     Review of Systems   Constitutional: Negative  HENT: Negative  Eyes: Negative  Negative for visual disturbance  Respiratory: Negative  Negative for shortness of breath  Cardiovascular: Negative  Negative for chest pain and leg swelling  Gastrointestinal: Negative  Endocrine: Negative  Genitourinary: Negative  Musculoskeletal: Negative  Negative for gait problem  Skin: Negative  Negative for wound  Allergic/Immunologic: Negative  Neurological: Positive for numbness (feet)  Negative for facial asymmetry, speech difficulty and weakness  Hematological: Negative  Psychiatric/Behavioral: Negative  Objective:      /74 (BP Location: Right arm, Patient Position: Sitting, Cuff Size: Adult)   Pulse 84   Ht 5' 8" (1 727 m)   Wt 97 2 kg (214 lb 3 2 oz)   BMI 32 57 kg/m²          Physical Exam  Vitals and nursing note reviewed  Constitutional:       Appearance: He is well-developed  HENT:      Head: Normocephalic and atraumatic  Eyes:      Conjunctiva/sclera: Conjunctivae normal    Cardiovascular:      Rate and Rhythm: Normal rate  Rhythm irregularly irregular  Pulses:           Radial pulses are 2+ on the right side and 2+ on the left side  Dorsalis pedis pulses are 2+ on the right side and 1+ on the left side  Posterior tibial pulses are 0 on the right side and 0 on the left side  Heart sounds: Normal heart sounds  No murmur heard  Comments: No carotid bruits B  Pulmonary:      Effort: Pulmonary effort is normal  No respiratory distress  Breath sounds: Normal breath sounds  No wheezing or rales  Abdominal:      General: There is no distension  Palpations: Abdomen is soft  Tenderness: There is no abdominal tenderness  There is no rebound  Musculoskeletal:         General: Normal range of motion  Cervical back: Normal range of motion and neck supple  Right lower le+ Edema present  Left lower le+ Edema present  Skin:     General: Skin is warm and dry  Neurological:      Mental Status: He is alert and oriented to person, place, and time  Psychiatric:         Behavior: Behavior normal            I have reviewed and made appropriate changes to the review of systems input by the medical assistant      Vitals:    23 1128   BP: 108/74   BP Location: Right arm   Patient Position: Sitting   Cuff Size: Adult   Pulse: 84   Weight: 97 2 kg (214 lb 3 2 oz)   Height: 5' 8" (1 727 m)       Patient Active Problem List   Diagnosis   • Epidural lipomatosis   • Lower back pain   • Lumbar radiculopathy   • BMI 39 0-39 9,adult   • Chronic pain syndrome   • Lumbar stenosis with neurogenic claudication   • Spondylolisthesis of lumbar region   • Spinal instability, lumbar   • PAD (peripheral artery disease) ([facility])   • Sleep apnea   • Persistent atrial fibrillation ([facility])   • Type 2 diabetes mellitus, without long-term current use of insulin ([facility])   • Atrial fibrillation ([facility])   • Cardiomyopathy ([facility] [address] )   • Coronary artery disease   • Asymptomatic stenosis of left carotid artery   • Status post insertion of drug-eluting stent into left anterior descending (LAD) artery   • Lower abdominal pain       Past Surgical History:   Procedure Laterality Date   • CARDIAC CATHETERIZATION Left 9/7/2022    Procedure: Cardiac catheterization;  Surgeon: Bryanna Seaman MD;  Location: [facility]; Service: Cardiology   • CARDIAC CATHETERIZATION N/A 9/7/2022    Procedure: Cardiac Coronary Angiogram;  Surgeon: Bryanna Seaman MD;  [facility];   Service: Cardiology   • CORONARY ANGIOPLASTY WITH STENT PLACEMENT     • IR ABDOMINAL AORTAGRAM  12/1/2020       Family History   Problem Relation Age of Onset   • Diabetes Mother    • Alzheimer's disease Mother    • Cancer Sister    • Aneurysm Father        Social History     Socioeconomic History   • Marital status: /Civil Union     Spouse name: Not on file   • Number of children: Not on file   • Years of education: Not on file   • Highest education level: Not on file   Occupational History   • Not on file   Tobacco Use   • Smoking status: Former   • Smokeless tobacco: Never   Vaping Use   • Vaping Use: Never used   Substance and Sexual Activity   • Alcohol use: Yes     Comment: holidays   • Drug use: Never   • Sexual activity: Yes     Partners: Female   Other Topics Concern   • Not on file   Social History Narrative   • Not on file     Social Determinants of Health     Financial Resource Strain: Not on file   Food Insecurity: Not on file   Transportation Needs: Not on file   Physical Activity: Not on file   Stress: Not on file   Social Connections: Not on file   Intimate Partner Violence: Not on file   Housing Stability: Not on file       Allergies   Allergen Reactions   • Iodinated Contrast Media Facial Swelling         Current Outpatient Medications:   •  apixaban (ELIQUIS) 5 mg, Take 5 mg by mouth 2 (two) times a day , Disp: , Rfl:   •  atorvastatin (LIPITOR) 80 mg tablet, Take 1 tablet (80 mg total) by mouth daily at bedtime, Disp: 30 tablet, Rfl: 0  •  Empagliflozin-metFORMIN HCl (Synjardy) 12 5-1000 MG TABS, Take 1 tablet by mouth, Disp: , Rfl:   •  fenofibrate (TRICOR) 54 MG tablet, , Disp: , Rfl:   •  FREESTYLE LITE test strip, , Disp: , Rfl:   •  furosemide (LASIX) 40 mg tablet, Take 0 5 tablets (20 mg total) by mouth daily, Disp: , Rfl: 0  •  ipratropium (ATROVENT) 0 03 % nasal spray, 2 sprays into each nostril 2 (two) times a day, Disp: 30 mL, Rfl: 5  •  metoprolol succinate (TOPROL-XL) 100 mg 24 hr tablet, Take 1 tablet (100 mg total) by mouth daily, Disp: 60 tablet, Rfl: 0  •  omega-3-acid ethyl esters (LOVAZA) 1 g capsule, Take 2 g by mouth 2 (two) times a day , Disp: , Rfl:   •  ticagrelor (BRILINTA) 90 MG, Take 1 tablet (90 mg total) by mouth every 12 (twelve) hours, Disp: 60 tablet, Rfl: 0  •  Trulicity 4 5 IB/3 8BF SOPN, , Disp: , Rfl:   •  acetaminophen (TYLENOL) 325 mg tablet, Take 650 mg by mouth every 6 (six) hours as needed for mild pain , Disp: , Rfl:   •  lisinopril (ZESTRIL) 10 mg tablet, Take 10 mg by mouth daily, Disp: , Rfl:   •  loratadine (CLARITIN) 10 mg tablet, Take 10 mg by mouth daily , Disp: , Rfl:

## 2023-02-15 DIAGNOSIS — I25.10 CORONARY ARTERY DISEASE: ICD-10-CM

## 2023-03-09 ENCOUNTER — TELEPHONE (OUTPATIENT)
Dept: GASTROENTEROLOGY | Facility: CLINIC | Age: 63
End: 2023-03-09

## 2023-03-09 NOTE — TELEPHONE ENCOUNTER
Regarding: Diagnosis   Contact: [phone]  Evaristo Munoz,    Can you mail me a copy of that letter again apparently they tossed that one  Or I can pick it up at the office       Thank you    Cesar Arizmendi

## 2023-03-09 NOTE — TELEPHONE ENCOUNTER
Regarding: Diagnosis   Contact: [phone]  Evaristo Munoz,    Can you mail me a copy of that letter again apparently they tossed that one  Or I can pick it up at the office       Thank you    Yaneli Edwards

## 2023-06-04 PROBLEM — I42.9 CARDIOMYOPATHY (HCC): Status: RESOLVED | Noted: 2020-10-28 | Resolved: 2023-06-04

## 2023-06-04 PROBLEM — E78.2 MIXED HYPERLIPIDEMIA: Status: ACTIVE | Noted: 2023-06-04

## 2023-06-09 NOTE — PROGRESS NOTES
Cardiology   Follow Up   Office Visit Note  Norman Backers   58 y o    male   MRN: [id number]  [address]  [address] [facility] [address] [facility] [address]  [phone]    PCP: Malena Lau MD  Cardiologist: Dr LUIGI Mosley                Summary of recommendations  -When finished his current prescription of ticagrelor 90 mg every 12 we will switch to ticagrelor 60 mg every 12, the long-term dose  -I encouraged aggressive CV risk reduction, moderate level activity 150 minutes/week, adherence to a heart healthy diet, improved diabetic control  -Follow up will be scheduled with Dr Phyllis Conway in Oct/Nov  -Colon Ca screenin2021 , up to date        Assessment/plan  CAD  · S/P PORTER Prox LAD and LM x 1 10/2020 (#3 total)  · [facility] :  RCA 50% stenosis, patent LM->prox LAD stent ;  medical management recommended  o On Brilinta 90 mg every 12, statin, beta-blocker  Last stent-   Hypertension, essential   BP  110/70 Toprol 100 mg daily, lisinopril 10 mg daily and Lasix 20 mg daily  Hyperlipidemia, mixed on atorvastatin 80 mg daily, fenofibrate 54 mg daily, Lovaza 2 gram BID  22: calc LDL 53   Latest Reference Range & Units 10/29/20 13:16 22 07:28   Cholesterol See Comment mg/dL 138 137   Triglycerides See Comment mg/dL 214 (H) 98   HDL >=40 mg/dL 45 64   Non-HDL Cholesterol mg/dl 93 73   LDL Calculated 0 - 100 mg/dL 50 53   Persistent a fib, with a rate control strategy  On 4 [facility] Eliquis 5 mg twice daily  Rate controlled with Toprol 100 mg daily  · EKG today: A-fib 88 bpm   Nonspecific T wave changes  · S/P DCCV 10/21--> NSR  PAD  Follows with vasc  · s/p  R SFA PTA/stent 20  · asympatomtic carotid artery stenosis  YEE, treated with CPAP  T2DM, without the use of insulin  23: HgA1C 7 2, on metformn,empaglifozin, trulicity  Former tobacco, in remission since   Obesity, BMI 32  Family Hx of cerebral aneurysms--Fa  Cardiac testing  · TTE 22 EF 55%  Wall motion is normal   Mild MR  PASP is normal  · [facility] 10/27/2020  Mid - distal LM calcified stenosis 40-50%, IVUS MLA 5 3  Mid LAD calcified stenosis (70% stenosis followed by 90%)  Moderate disease of mid LCX with maximum of 50% stenosis  Proximal RCA 50% stenosis s/p negative iFR, RPDA 50% stenosis in mid  RECOMMENDATIONS:  MEdical management  CT surgery evaluation for CABG  · [facility] 10/30 2020: Successful IVUS-guided unprotected left main and LAD PCI  · SPECT 2/22:  Normal exercise nuclear stress test without ischemia or scar  Normal left ventricular ejection fraction calculated at 67%  · 24 hr Holter monitor August 2022   100% A-fib  Average heart rate 81 bpm  · TTE 9/2/22  EF 55%  Wall motion was normal   The RV was normal   Mild mitral regurgitation  PASP normal   • [facility] 9/7/22  LM: Previously placed mid LM to proximal LAD stent widely patent  LAD moderate in size, angiographically normal  Circumflex small, nondominant  Moderate diffuse disease throughout the vessel  RCA: Proximal RCA 50% stenosed  Moderate diffuse disease throughout the vessel  There was no obstructive CAD  The left main and LAD stents remain widely patent  The 50% proximal RCA stenosis is unchanged from 10/20  It was shown at that time to be non-significant by iFR  Current stress test was also without evidence of ischemia  HPI  Julio Cesar Jones is a 57 yo male with CAD, PAD, persistent a fib,Hx ICM with a recovered EF, HTN, HL, obesity, YEE, Hx ETOH abuse, tob abuse, in remission  His cardiac history extends back to 2020  He presented to the hospital with SOB and palpitations  He also had chest pain while walking [place] the airport  On admission he was found to be in A-fib with RVR  An echo showed a cardiomyopathy, with an of EF 45%, extensive coronary artery calcifications noted on CT  Given these findings, he underwent left heart catheterization  This showed significant left main and LAD disease    He was evaluated by CT surgery who recommended percutaneous coronary intervention was the best option  He did undergo PORTER x2 to the LAD and 1 to the left main 10/27/23  His EF normalized  He was placed on DAPT, and Eliquis for thromboembolism protection given his A-fib  Initially, a rate change rate control strategy with Toprol, and anticoagulated with Eliquis  He was last seen by his cardiologist Dr Garfield London 8/25/22  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  Given this he had a nuclear stress test which was essentially normal   His ejection fraction improved  A 24 hr Holter monitor showed controlled A-fib  The FFA required an echocardiogram and coronary angiography  The echo, performed September 2022 showed ejection fraction 55%, with normal wall motion  The right ventricle was normal   There was mild mitral vegetation  Coronary angiography showed no obstructive CAD  The left main and LAD stents were widely patent  There was a 50% proximal RCA stenosis, unchanged from October 2020, medically managed      6/5/23  Routine follow-up  He and his wife have purchased a home in [place]  They will be spending at least part of their time there  He continues to work  He reports a good functional status  He is able to walk regularly, continue to take care of his yard, weed wack and use a push lawnmower, without cardiorespiratory symptoms  He is adherent to his CPAP treatment  It is portable and he can take if he goes on a cruise  He denies palpitations  He has no claudication  In fact he lost a lot of weight after walking more after his lower extremity vascular surgery  He is adherent with medical therapy  His labs are followed by his PCP, every September  We reviewed his last LDL and hemoglobin A1c  His EKG shows A-fib, with a CVR at 88 bpm   /70  There is some room for improvement in his diabetic control    He is, on average, more active in the summer than the winter  Once he has completed his prescription of ticagrelor 90 mg every 12, will switch to 60 mg every 12 which is the chronic long-term dose        I have spent 25 minutes with Patient  today in which greater than 50% of this time was spent in counseling/coordination of care regarding Instructions for management, Patient and family education, Importance of tx compliance, Risk factor reductions, Documenting in the medical record, Reviewing / ordering tests, medicine, procedures   and Obtaining or reviewing history    Assessment  Diagnoses and all orders for this visit:    Coronary artery disease    Persistent atrial fibrillation ([place] [address] )  -     POCT ECG    Status post insertion of drug-eluting stent into left anterior descending (LAD) artery  -     ticagrelor (Brilinta) 60 MG; Take 1 tablet (60 mg total) by mouth every 12 (twelve) hours    Longstanding persistent atrial fibrillation ([facility])    Obstructive sleep apnea syndrome    PAD (peripheral artery disease) ([place] [address] )    Mixed hyperlipidemia          Past Medical History:   Diagnosis Date   • Atrial fibrillation ([facility])    • Back pain    • COVID-19    • CPAP (continuous positive airway pressure) dependence    • Diabetes mellitus ([facility])    • Hyperlipidemia    • Hypertension    • Myocardial infarction ([place] [address] )    • PAD (peripheral artery disease) ([facility])    • Persistent atrial fibrillation ([place] [address] )    • Sleep apnea    • Spinal stenosis        Review of Systems   Constitutional: Negative for chills  Cardiovascular: Negative for chest pain, claudication, cyanosis, dyspnea on exertion, irregular heartbeat, leg swelling, near-syncope, orthopnea, palpitations, paroxysmal nocturnal dyspnea and syncope  Respiratory: Negative for cough and shortness of breath  Gastrointestinal: Negative for heartburn and nausea  Neurological: Negative for dizziness, focal weakness, headaches, light-headedness and weakness  All other systems reviewed and are negative        Allergies   Allergen Reactions   • Iodinated Contrast Media Facial Swelling           Current Outpatient Medications:   •  apixaban (ELIQUIS) 5 mg, Take 5 mg by mouth 2 (two) times a day , Disp: , Rfl:   •  atorvastatin (LIPITOR) 80 mg tablet, Take 1 tablet (80 mg total) by mouth daily at bedtime, Disp: 30 tablet, Rfl: 0  •  Empagliflozin-metFORMIN HCl (Synjardy) 12 5-1000 MG TABS, Take 1 tablet by mouth, Disp: , Rfl:   •  fenofibrate (TRICOR) 54 MG tablet, , Disp: , Rfl:   •  FREESTYLE LITE test strip, , Disp: , Rfl:   •  furosemide (LASIX) 40 mg tablet, Take 0 5 tablets (20 mg total) by mouth daily, Disp: , Rfl: 0  •  ipratropium (ATROVENT) 0 03 % nasal spray, 2 sprays into each nostril 2 (two) times a day, Disp: 30 mL, Rfl: 5  •  lisinopril (ZESTRIL) 10 mg tablet, Take 10 mg by mouth daily, Disp: , Rfl:   •  metoprolol succinate (TOPROL-XL) 100 mg 24 hr tablet, Take 1 tablet (100 mg total) by mouth daily, Disp: 60 tablet, Rfl: 0  •  omega-3-acid ethyl esters (LOVAZA) 1 g capsule, Take 2 g by mouth 2 (two) times a day , Disp: , Rfl:   •  ticagrelor (Brilinta) 60 MG, Take 1 tablet (60 mg total) by mouth every 12 (twelve) hours, Disp: , Rfl:   •  Trulicity 4 5 WM/8 4GE SOPN, , Disp: , Rfl:         Social History     Socioeconomic History   • Marital status: /Civil Union     Spouse name: Not on file   • Number of children: Not on file   • Years of education: Not on file   • Highest education level: Not on file   Occupational History   • Not on file   Tobacco Use   • Smoking status: Former   • Smokeless tobacco: Never   Vaping Use   • Vaping Use: Never used   Substance and Sexual Activity   • Alcohol use: Yes     Comment: holidays   • Drug use: Never   • Sexual activity: Yes     Partners: Female   Other Topics Concern   • Not on file   Social History Narrative   • Not on file     Social Determinants of Health     Financial Resource Strain: Not on file   Food Insecurity: Not on file   Transportation Needs: Not on file   Physical Activity: Not on file "  Stress: Not on file   Social Connections: Not on file   Intimate Partner Violence: Not on file   Housing Stability: Not on file       Family History   Problem Relation Age of Onset   • Diabetes Mother    • Alzheimer's disease Mother    • Cancer Sister    • Aneurysm Father        Physical Exam  Vitals and nursing note reviewed  Constitutional:       General: He is not in acute distress  Appearance: He is obese  HENT:      Head: Normocephalic and atraumatic  Eyes:      Conjunctiva/sclera: Conjunctivae normal    Cardiovascular:      Rate and Rhythm: Normal rate and regular rhythm  Pulses: Intact distal pulses  Heart sounds: Normal heart sounds  Pulmonary:      Effort: Pulmonary effort is normal       Breath sounds: Normal breath sounds  Abdominal:      General: Bowel sounds are normal       Palpations: Abdomen is soft  Musculoskeletal:         General: Normal range of motion  Cervical back: Normal range of motion and neck supple  Skin:     General: Skin is warm and dry  Neurological:      Mental Status: He is alert and oriented to person, place, and time  Vitals: Blood pressure 110/70, pulse 88, height 5' 8\" (1 727 m), weight 96 6 kg (213 lb), SpO2 98 %     Wt Readings from Last 3 Encounters:   06/12/23 96 6 kg (213 lb)   01/04/23 97 2 kg (214 lb 3 2 oz)   11/22/22 92 1 kg (203 lb)         Labs & Results:  Lab Results   Component Value Date    HCT 49 2 09/02/2022    HGB 16 4 09/02/2022    MCV 96 09/02/2022     09/02/2022    WBC 8 61 09/02/2022     No results found for: \"BNP\"  No components found for: \"CHEM\"  Troponin I   Date Value Ref Range Status   10/24/2020 <0 02 <=0 04 ng/mL Final     Comment:     [employer] Chemistry analyzer 99% cutoff is > 0 04 ng/mL in network labs     o cTnI 99% cutoff is useful only when applied to patients in the clinical setting of myocardial ischemia   o cTnI 99% cutoff should be interpreted in the context of clinical history, ECG findings " and possibly cardiac imaging to establish correct diagnosis  o cTnI 99% cutoff may be suggestive but clearly not indicative of a coronary event without the clinical setting of myocardial ischemia  Results for orders placed during the hospital encounter of 10/24/20    Echo complete with contrast if indicated    Narrative  [address] [place] [id number]  [phone]    Transthoracic Echocardiogram  2D, M-mode, Doppler, and Color Doppler    Study date:  25-Oct-2020    Patient: Dereck Mars  MR number: [id number]  Account number: [de-identified]  : 1960  Age: 63 years  Gender: Male  Status: Inpatient  Location: Bedside  Height: 68 in  Weight: 253 4 lb  BP: 155/ 99 mmHg    Indications: Afib    Diagnoses: I48 0 - Atrial fibrillation    Sonographer:  James Duron RDCS  Interpreting Physician:  Jillian Mace MD  Primary Physician:  Enrike Warner MD  Referring Physician:  Joyce Hester MD  Group:  [facility]    SUMMARY    PROCEDURE INFORMATION:  This was a technically difficult study  LEFT VENTRICLE:  Systolic function was mildly reduced  Ejection fraction was estimated to be 45 %  There was mild diffuse hypokinesis  Wall thickness was mildly increased  There was mild concentric hypertrophy  Features were consistent with a pseudonormal left ventricular filling pattern, with concomitant abnormal relaxation and increased filling pressure (grade 2 diastolic dysfunction)  MITRAL VALVE:  There was trace regurgitation  AORTIC VALVE:  There was trace regurgitation  TRICUSPID VALVE:  There was trace to mild regurgitation  Pulmonary artery systolic pressure was within the normal range  AORTA:  The root exhibited mild dilatation - 3 8 cm  HISTORY: PRIOR HISTORY: Afib, DM, HLD, HTN, PAD, Sleep Apnea    PROCEDURE: The procedure was performed at the bedside  This was a routine study  The transthoracic approach was used  The study included complete 2D imaging, M-mode, complete spectral Doppler, and color Doppler  The heart rate was 91 bpm,  at the start of the study  Images were obtained from the parasternal, apical, subcostal, and suprasternal notch acoustic windows  This was a technically difficult study  LEFT VENTRICLE: Size was normal  Systolic function was mildly reduced  Ejection fraction was estimated to be 45 %  There was mild diffuse hypokinesis  Wall thickness was mildly increased  There was mild concentric hypertrophy  No evidence  of apical thrombus  DOPPLER: Features were consistent with a pseudonormal left ventricular filling pattern, with concomitant abnormal relaxation and increased filling pressure (grade 2 diastolic dysfunction)  RIGHT VENTRICLE: The size was normal  Systolic function was normal  Wall thickness was normal     LEFT ATRIUM: Size was normal     RIGHT ATRIUM: Size was normal     MITRAL VALVE: Valve structure was normal  There was normal leaflet separation  DOPPLER: The transmitral velocity was within the normal range  There was no evidence for stenosis  There was trace regurgitation  AORTIC VALVE: The valve was trileaflet  Leaflets exhibited normal thickness and normal cuspal separation  DOPPLER: Transaortic velocity was within the normal range  There was no evidence for stenosis  There was trace regurgitation  TRICUSPID VALVE: The valve structure was normal  There was normal leaflet separation  DOPPLER: The transtricuspid velocity was within the normal range  There was no evidence for stenosis  There was trace to mild regurgitation  Pulmonary  artery systolic pressure was within the normal range  PULMONIC VALVE: Leaflets exhibited normal thickness, no calcification, and normal cuspal separation  DOPPLER: The transpulmonic velocity was within the normal range  There was no significant regurgitation  PERICARDIUM: There was no pericardial effusion   The pericardium was normal in appearance  AORTA: The root exhibited mild dilatation - 3 8 cm  SYSTEMIC VEINS: IVC: The inferior vena cava was normal in size  SYSTEM MEASUREMENT TABLES    2D mode  AoR Diam (2D): 37 mm  AoR Diam; Mean (2D): 37 mm  Asc Aorta Diam (2D): 38 mm  Asc Aorta Diam; Mean (2D): 38 mm  LA Dimension (2D): 42 mm  LA Dimension; Mean (2D): 42 mm  LA/Ao (2D): 1 1  EDV (2D-Cubed): 80577 mm3  EF (2D-Cubed): 72 %  ESV (2D-Cubed): 84358 mm3  FS (2D-Cubed): 34 6 %  FS (2D-Teich): 34 6 %  IVS/LVPW (2D): 1  IVSd (2D): 11 3 mm  IVSd; Mean chosen (2D): 11 3 mm  LVIDd (2D): 45 4 mm  LVIDd; Mean (2D): 45 4 mm  LVIDs (2D): 29 7 mm  LVIDs; Mean (2D): 29 7 mm  LVPWd (2D): 11 4 mm  LVPWd; Mean (2D): 11 4 mm  Left Ventricular Ejection Fraction; Teichholz; 2D mode;: 63 8 %  Left Ventricular End Diastolic Volume; Teichholz; 2D mode;: 76873 mm3  Left Ventricular End Systolic Volume; Teichholz; 2D mode;: 31483 mm3  SI (2D-Cubed): 29 8 ml/m2  SV (2D-Cubed): 33721 mm3  Stroke Index; Teichholz; 2D mode;: 26 6 ml/m2  Stroke Volume; Teichholz; 2D mode;: 57687 mm3    Apical four chamber  Left Atrium MOD Diam; Most recent value chosen; End Systole; Apical four chamber;: 23 8 mm  Left Atrium MOD Diam; Most recent value chosen; End Systole; Apical four chamber;: 22 4 mm  Left Atrium MOD Diam; Most recent value chosen; End Systole; Apical four chamber;: 20 6 mm  Left Atrium MOD Diam; Most recent value chosen; End Systole; Apical four chamber;: 11 2 mm  Left Atrium MOD Diam; Most recent value chosen; End Systole; Apical four chamber;: 21 mm  Left Atrium MOD Diam; Most recent value chosen; End Systole; Apical four chamber;: 28 4 mm  Left Atrium MOD Diam; Most recent value chosen; End Systole; Apical four chamber;: 32 2 mm  Left Atrium MOD Diam; Most recent value chosen; End Systole; Apical four chamber;: 35 7 mm  Left Atrium MOD Diam; Most recent value chosen; End Systole;  Apical four chamber;: 36 7 mm  Left Atrium MOD Diam; Most recent value chosen; End Systole; Apical four chamber;: 37 4 mm  Left Atrium MOD Diam; Most recent value chosen; End Systole; Apical four chamber;: 37 1 mm  Left Atrium MOD Diam; Most recent value chosen; End Systole; Apical four chamber;: 37 1 mm  Left Atrium MOD Diam; Most recent value chosen; End Systole; Apical four chamber;: 36 1 mm  Left Atrium MOD Diam; Most recent value chosen; End Systole; Apical four chamber;: 35 4 mm  Left Atrium MOD Diam; Most recent value chosen; End Systole; Apical four chamber;: 34 3 mm  Left Atrium MOD Diam; Most recent value chosen; End Systole; Apical four chamber;: 32 9 mm  Left Atrium MOD Diam; Most recent value chosen; End Systole; Apical four chamber;: 31 1 mm  Left Atrium MOD Diam; Most recent value chosen; End Systole; Apical four chamber;: 29 7 mm  Left Atrium MOD Diam; Most recent value chosen; End Systole; Apical four chamber;: 27 7 mm  Left Atrium MOD Diam; Most recent value chosen; End Systole; Apical four chamber;: 25 9 mm  Left Atrium Systolic Area; Most recent value chosen; Method of Disks, Single Plane; 2D mode; Apical four chamber;: 1720 mm2  Left Atrium Systolic Volume Index; Method of Disks, Single Plane; 2D mode; Apical four chamber;: 18 6 ml/m2  Left Atrium Systolic Volume; Most recent value chosen; Method of Disks, Single Plane; 2D mode; Apical four chamber;: 38818 mm3  Left Atrium systolic major axis; Most recent value chosen; Method of Disks, Single Plane; 2D mode; Apical four chamber;: 57 mm  EF (A4C): 52 2 %  LV MOD Diam; Recent value; End Diastole (A4C): 43 8 mm  LV MOD Diam; Recent value; End Diastole (A4C): 43 8 mm  LV MOD Diam; Recent value; End Diastole (A4C): 20 6 mm  LV MOD Diam; Recent value; End Diastole (A4C): 14 2 mm  LV MOD Diam; Recent value; End Diastole (A4C): 21 3 mm  LV MOD Diam; Recent value; End Diastole (A4C): 26 mm  LV MOD Diam; Recent value; End Diastole (A4C): 29 9 mm  LV MOD Diam; Recent value; End Diastole (A4C): 32 4 mm  LV MOD Diam; Recent value;  End Diastole (A4C): 33 8 mm  LV MOD Diam; Recent value; End Diastole (A4C): 35 2 mm  LV MOD Diam; Recent value; End Diastole (A4C): 36 3 mm  LV MOD Diam; Recent value; End Diastole (A4C): 36 3 mm  LV MOD Diam; Recent value; End Diastole (A4C): 36 3 mm  LV MOD Diam; Recent value; End Diastole (A4C): 37 4 mm  LV MOD Diam; Recent value; End Diastole (A4C): 38 5 mm  LV MOD Diam; Recent value; End Diastole (A4C): 40 2 mm  LV MOD Diam; Recent value; End Diastole (A4C): 42 mm  LV MOD Diam; Recent value; End Diastole (A4C): 43 5 mm  LV MOD Diam; Recent value; End Diastole (A4C): 43 8 mm  LV MOD Diam; Recent value; End Diastole (A4C): 42 7 mm  LV MOD Diam; Recent value; End Systole (A4C): 14 8 mm  LV MOD Diam; Recent value; End Systole (A4C): 33 5 mm  LV MOD Diam; Recent value; End Systole (A4C): 32 mm  LV MOD Diam; Recent value; End Systole (A4C): 8 1 mm  LV MOD Diam; Recent value; End Systole (A4C): 11 3 mm  LV MOD Diam; Recent value; End Systole (A4C): 14 4 mm  LV MOD Diam; Recent value; End Systole (A4C): 16 9 mm  LV MOD Diam; Recent value; End Systole (A4C): 19 3 mm  LV MOD Diam; Recent value; End Systole (A4C): 21 5 mm  LV MOD Diam; Recent value; End Systole (A4C): 23 3 mm  LV MOD Diam; Recent value; End Systole (A4C): 24 7 mm  LV MOD Diam; Recent value; End Systole (A4C): 25 7 mm  LV MOD Diam; Recent value; End Systole (A4C): 26 1 mm  LV MOD Diam; Recent value; End Systole (A4C): 27 1 mm  LV MOD Diam; Recent value; End Systole (A4C): 28 1 mm  LV MOD Diam; Recent value; End Systole (A4C): 29 9 mm  LV MOD Diam; Recent value; End Systole (A4C): 32 mm  LV MOD Diam; Recent value; End Systole (A4C): 33 8 mm  LV MOD Diam; Recent value; End Systole (A4C): 34 5 mm  LV MOD Diam; Recent value; End Systole (A4C): 34 5 mm  Left Ventricle diastolic major axis; Most recent value chosen; Method of Disks, Single Plane; 2D mode; Apical four chamber;: 92 5 mm  Left Ventricle systolic major axis;  Most recent value chosen; Method of Disks, Single Plane; 2D mode; Apical four chamber;: 85 2 mm  Left Ventricular Diastolic Area; Most recent value chosen; Method of Disks, Single Plane; 2D mode; Apical four chamber;: 3240 mm2  Left Ventricular End Diastolic Volume; Most recent value chosen; Method of Disks, Single Plane; 2D mode; Apical four chamber;: 99752 mm3  Left Ventricular End Systolic Volume; Most recent value chosen; Method of Disks, Single Plane; 2D mode; Apical four chamber;: 14112 mm3  Left Ventricular Systolic Area; Most recent value chosen; Method of Disks, Single Plane; 2D mode; Apical four chamber;: 2110 mm2  SI (A4C): 21 6 ml/m2  SV (A4C): 89277 mm3  Right Atrium MOD Diam; Most recent value chosen; Method of Disks, Single Plane; End Systole; 2D mode; Apical four chamber;: 15 9 mm  Right Atrium MOD Diam; Most recent value chosen; Method of Disks, Single Plane; End Systole; 2D mode; Apical four chamber;: 23 mm  Right Atrium MOD Diam; Most recent value chosen; Method of Disks, Single Plane; End Systole; 2D mode; Apical four chamber;: 27 3 mm  Right Atrium MOD Diam; Most recent value chosen; Method of Disks, Single Plane; End Systole; 2D mode; Apical four chamber;: 29 7 mm  Right Atrium MOD Diam; Most recent value chosen; Method of Disks, Single Plane; End Systole; 2D mode; Apical four chamber;: 31 8 mm  Right Atrium MOD Diam; Most recent value chosen; Method of Disks, Single Plane; End Systole; 2D mode; Apical four chamber;: 33 3 mm  Right Atrium MOD Diam; Most recent value chosen; Method of Disks, Single Plane; End Systole; 2D mode; Apical four chamber;: 34 3 mm  Right Atrium MOD Diam; Most recent value chosen; Method of Disks, Single Plane; End Systole; 2D mode; Apical four chamber;: 34 mm  Right Atrium MOD Diam; Most recent value chosen; Method of Disks, Single Plane; End Systole; 2D mode; Apical four chamber;: 34 4 mm  Right Atrium MOD Diam; Most recent value chosen; Method of Disks, Single Plane; End Systole; 2D mode;  Apical four chamber;: 34 4 mm  Right Atrium MOD Diam; Most recent value chosen; Method of Disks, Single Plane; End Systole; 2D mode; Apical four chamber;: 34 mm  Right Atrium MOD Diam; Most recent value chosen; Method of Disks, Single Plane; End Systole; 2D mode; Apical four chamber;: 32 9 mm  Right Atrium MOD Diam; Most recent value chosen; Method of Disks, Single Plane; End Systole; 2D mode; Apical four chamber;: 32 6 mm  Right Atrium MOD Diam; Most recent value chosen; Method of Disks, Single Plane; End Systole; 2D mode; Apical four chamber;: 32 6 mm  Right Atrium MOD Diam; Most recent value chosen; Method of Disks, Single Plane; End Systole; 2D mode; Apical four chamber;: 31 2 mm  Right Atrium MOD Diam; Most recent value chosen; Method of Disks, Single Plane; End Systole; 2D mode; Apical four chamber;: 30 1 mm  Right Atrium MOD Diam; Most recent value chosen; Method of Disks, Single Plane; End Systole; 2D mode; Apical four chamber;: 29 7 mm  Right Atrium MOD Diam; Most recent value chosen; Method of Disks, Single Plane; End Systole; 2D mode; Apical four chamber;: 28 mm  Right Atrium MOD Diam; Most recent value chosen; Method of Disks, Single Plane; End Systole; 2D mode; Apical four chamber;: 26 9 mm  Right Atrium MOD Diam; Most recent value chosen; Method of Disks, Single Plane; End Systole; 2D mode; Apical four chamber;: 5 7 mm  Right Atrium Systolic Area; Most recent value chosen; Method of Disks, Single Plane; End Systole; 2D mode; Apical four chamber;: 1530 mm2  Right Atrium Systolic Major Axis; Most recent value chosen; Method of Disks, Single Plane; End Systole; 2D mode; Apical four chamber;: 51 7 mm  Right Atrium Systolic Volume Index; Method of Disks, Single Plane; End Systole; 2D mode; Apical four chamber;: 16 1 ml/m2  Right Atrium Systolic Volume; Most recent value chosen; Method of Disks, Single Plane; End Systole; 2D mode; Apical four chamber;: 33295 mm3  Right Ventricle Basal Diameter; 2D mode;  Apical four chamber;: 24 4 mm  Right Ventricle Basal Diameter; Mean; Mean value chosen; 2D mode; Apical four chamber;: 24 4 mm    M mode  Tricuspid Annular Plane Systolic Excursion; Mean; Mean value chosen; Tricuspid Annulus; M mode;: 14 6 mm  Tricuspid Annular Plane Systolic Excursion; Tricuspid Annulus; M mode;: 14 6 mm    Tissue Doppler Imaging  LV Peak Early Kaur Tissue Charles; Mean; Medial MA (TDI): 82 7 mm/s  LV Peak Early Kaur Tissue Charles; Medial MA (TDI): 82 7 mm/s    Unspecified Scan Mode  MV Peak Charles/LV Peak Tissue Charles E-Wave; Medial MA: 12 7  DT; Antegrade Flow: 141 ms  DT; Mean; Antegrade Flow: 141 ms  Dec [place]; Antegrade Flow: 7470 mm/s2  Dec [place]; Mean; Antegrade Flow: 7470 mm/s2  MV E/A: 2 3  MV Peak A Charles: 466 mm/s  MV Peak A Charles; Mean: 466 mm/s  MV Peak E Charles; Antegrade Flow: 1050 mm/s  MV Peak E Charles; Mean; Antegrade Flow: 1050 mm/s  MVA (PHT): 537 mm2  PHT: 41 ms  PHT; Mean: 41 ms  Peak Grad; Mean; Regurgitant Flow: 22 mm[Hg]  Vmax; Mean; Regurgitant Flow: 2350 mm/s  Vmax; Regurgitant Flow: 2350 mm/s    [facility]    Prepared and electronically signed by    Eliel Mcqueen MD  Signed 25-Oct-2020 19:59:33    No results found for this or any previous visit  This note was completed in part utilizing [employer] direct voice recognition software  Grammatical errors, random word insertion, spelling mistakes, and incomplete sentences may be an occasional consequence of the system secondary to software limitations, ambient noise and hardware issues  At the time of dictation, efforts were made to edit, clarify and /or correct errors  Please read the chart carefully and recognize, using context, where substitutions have occurred    If you have any questions or concerns about the context, text or information contained within the body of this dictation, please contact myself, the provider, for further clarification

## 2023-06-12 ENCOUNTER — OFFICE VISIT (OUTPATIENT)
Dept: CARDIOLOGY CLINIC | Facility: CLINIC | Age: 63
End: 2023-06-12

## 2023-06-12 VITALS
DIASTOLIC BLOOD PRESSURE: 70 MMHG | HEIGHT: 68 IN | WEIGHT: 213 LBS | SYSTOLIC BLOOD PRESSURE: 110 MMHG | OXYGEN SATURATION: 98 % | BODY MASS INDEX: 32.28 KG/M2 | HEART RATE: 88 BPM

## 2023-06-12 DIAGNOSIS — I73.9 PAD (PERIPHERAL ARTERY DISEASE) (HCC): ICD-10-CM

## 2023-06-12 DIAGNOSIS — I48.19 PERSISTENT ATRIAL FIBRILLATION (HCC): ICD-10-CM

## 2023-06-12 DIAGNOSIS — I25.10 CORONARY ARTERY DISEASE: Primary | ICD-10-CM

## 2023-06-12 DIAGNOSIS — Z95.5 STATUS POST INSERTION OF DRUG-ELUTING STENT INTO LEFT ANTERIOR DESCENDING (LAD) ARTERY: ICD-10-CM

## 2023-06-12 DIAGNOSIS — E78.2 MIXED HYPERLIPIDEMIA: ICD-10-CM

## 2023-06-12 DIAGNOSIS — G47.33 OBSTRUCTIVE SLEEP APNEA SYNDROME: ICD-10-CM

## 2023-06-12 DIAGNOSIS — I48.11 LONGSTANDING PERSISTENT ATRIAL FIBRILLATION (HCC): ICD-10-CM

## 2023-06-12 PROBLEM — E66.811 CLASS 1 OBESITY IN ADULT: Status: ACTIVE | Noted: 2023-06-12

## 2023-06-12 PROBLEM — E66.9 CLASS 1 OBESITY IN ADULT: Status: ACTIVE | Noted: 2023-06-12

## 2023-06-12 NOTE — LETTER
2023     Forest Interiano MD  [address] [facility] [address]  [address]    Patient: Osorio Gutierrez   YOB: 1960   Date of Visit: 2023       Dear Dr Bakari Calix:    Thank you for referring Osorio Gutierrez to me for evaluation  Below are my notes for this consultation  If you have questions, please do not hesitate to call me  I look forward to following your patient along with you  Sincerely,        RODO Espino        CC: MD Nadine Ortiz, [place]  2023  3:33 PM  Sign when Signing Visit  Cardiology   Follow Up   Office Visit Note  Osorio Gutierrez   58 y o    male   MRN: [id number]  [address]  [address] [place] [id number] [facility] [address]  [phone]  [phone]    PCP: Forest Interiano MD  Cardiologist: Dr LUIGI Garcia                Summary of recommendations  -When finished his current prescription of ticagrelor 90 mg every 12 we will switch to ticagrelor 60 mg every 12, the long-term dose  -I encouraged aggressive CV risk reduction, moderate level activity 150 minutes/week, adherence to a heart healthy diet, improved diabetic control  -Follow up will be scheduled with Dr Carline Castillo in Oct/Nov  -Colon Ca screenin2021 , up to date        Assessment/plan  CAD  S/P PORTER Prox LAD and LM x 1 10/2020 (#3 total)  [facility] :  RCA 50% stenosis, patent LM->prox LAD stent ;  medical management recommended  On Brilinta 90 mg every 12, statin, beta-blocker    Last stent-   Hypertension, essential   BP  110/70 Toprol 100 mg daily, lisinopril 10 mg daily and Lasix 20 mg daily  Hyperlipidemia, mixed on atorvastatin 80 mg daily, fenofibrate 54 mg daily, Lovaza 2 gram BID  22: calc LDL 53   Latest Reference Range & Units 10/29/20 13:16 22 07:28   Cholesterol See Comment mg/dL 138 137   Triglycerides See Comment mg/dL 214 (H) 98   HDL >=40 mg/dL 45 64   Non-HDL Cholesterol mg/dl 93 73   LDL Calculated 0 - 100 mg/dL 50 53   Persistent a fib, with a rate control strategy  On [address] Eliquis 5 mg twice daily  Rate controlled with Toprol 100 mg daily  EKG today: A-fib 88 bpm   Nonspecific T wave changes  S/P DCCV 10/21--> NSR  PAD  Follows with vasc  s/p  R SFA PTA/stent 12/1/20  asympatomtic carotid artery stenosis  YEE, treated with CPAP  T2DM, without the use of insulin  2/2/23: HgA1C 7 2, on metformn,empaglifozin, trulicity  Former tobacco, in remission since 1998  Obesity, BMI 32  Family Hx of cerebral aneurysms--Fa  Cardiac testing  TTE 9/2/22 EF 55%  Wall motion is normal   Mild MR  PASP is normal  [facility] 10/27/2020  Mid - distal LM calcified stenosis 40-50%, IVUS MLA 5 3  Mid LAD calcified stenosis (70% stenosis followed by 90%)  Moderate disease of mid LCX with maximum of 50% stenosis  Proximal RCA 50% stenosis s/p negative iFR, RPDA 50% stenosis in mid  RECOMMENDATIONS:  MEdical management  CT surgery evaluation for CABG  [facility] 10/30 2020: Successful IVUS-guided unprotected left main and LAD PCI  SPECT 2/22:  Normal exercise nuclear stress test without ischemia or scar  Normal left ventricular ejection fraction calculated at 67%  24 hr Holter monitor August 2022   100% A-fib  Average heart rate 81 bpm  TTE 9/2/22  EF 55%  Wall motion was normal   The RV was normal   Mild mitral regurgitation  PASP normal   [facility] 9/7/22  LM: Previously placed mid LM to proximal LAD stent widely patent  LAD moderate in size, angiographically normal  Circumflex small, nondominant  Moderate diffuse disease throughout the vessel  RCA: Proximal RCA 50% stenosed  Moderate diffuse disease throughout the vessel  There was no obstructive CAD  The left main and LAD stents remain widely patent  The 50% proximal RCA stenosis is unchanged from 10/20  It was shown at that time to be non-significant by iFR  Current stress test was also without evidence of ischemia                    HPI  Yennifer Mckinney is a 57 yo male with CAD, PAD, persistent a fib,Hx ICM with a recovered EF, HTN, HL, obesity, YEE, Hx ETOH abuse, tob abuse, in remission  His cardiac history extends back to 2020  He presented to the hospital with SOB and palpitations  He also had chest pain [place] the airport  On admission he was found to be in A-fib with RVR  An echo showed a cardiomyopathy, with an of EF 45%, extensive coronary artery calcifications noted on CT  Given these findings, he underwent left heart catheterization  This showed significant left main and LAD disease  He was evaluated by CT surgery who recommended percutaneous coronary intervention was the best option  He did undergo PORTER x2 to the LAD and 1 to the left main 10/27/23  His EF normalized  He was placed on DAPT, and Eliquis for thromboembolism protection given his A-fib  Initially, a rate change rate control strategy with Toprol, and anticoagulated with Eliquis  He was last seen by his cardiologist Dr Yessica Luna 8/25/22  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  Given this he had a nuclear stress test which was essentially normal   His ejection fraction improved  A 24 hr Holter monitor showed controlled A-fib  The FFA required an echocardiogram and coronary angiography  The echo, performed September 2022 showed ejection fraction 55%, with normal wall motion  The right ventricle was normal   There was mild mitral vegetation  Coronary angiography showed no obstructive CAD  The left main and LAD stents were widely patent  There was a 50% proximal RCA stenosis, unchanged from October 2020, medically managed      6/5/23  Routine follow-up  He and his wife have purchased a home in [place]  They will be spending at least part of their time there  He continues to work  He reports a good functional status  He is able to walk regularly, continue to take care of his yard, weed wack and use a push lawnmower, without cardiorespiratory symptoms    He is adherent to his CPAP treatment  It is portable and he can take if he goes on a cruise  He denies palpitations  He has no claudication  In fact he lost a lot of weight after walking more after his lower extremity vascular surgery  He is adherent with medical therapy  His labs are followed by his PCP, every September  We reviewed his last LDL and hemoglobin A1c  His EKG shows A-fib, with a CVR at 88 bpm   /70  There is some room for improvement in his diabetic control  He is, on average, more active in the summer than the winter  Once he has completed his prescription of ticagrelor 90 mg every 12, will switch to 60 mg every 12 which is the chronic long-term dose        I have spent 25 minutes with Patient  today in which greater than 50% of this time was spent in counseling/coordination of care regarding Instructions for management, Patient and family education, Importance of tx compliance, Risk factor reductions, Documenting in the medical record, Reviewing / ordering tests, medicine, procedures   and Obtaining or reviewing history    Assessment  Diagnoses and all orders for this visit:    Coronary artery disease    Persistent atrial fibrillation ([place] [address] )  -     POCT ECG    Status post insertion of drug-eluting stent into left anterior descending (LAD) artery  -     ticagrelor (Brilinta) 60 MG;  Take 1 tablet (60 mg total) by mouth every 12 (twelve) hours    Longstanding persistent atrial fibrillation (HCC)    Obstructive sleep apnea syndrome    PAD [facility]per[facility] )    Mixed hyperlipidemia          Past Medical History:   Diagnosis Date   • Atrial fibrillation (HCC)    • Back pain    • COVID-19    • CPAP (continuous positive airway pressure) dependence    • Diabetes mellitus ([facility])    • Hyperlipidemia    • Hypertension    • Myocardial infarction [facility])    • PAD (peripheral artery disease) ([facility])    • Persistent atrial fibrillation ([facility] )    • Sleep apnea    • Spinal stenosis        Review of Systems   Constitutional: Negative for chills  Cardiovascular: Negative for chest pain, claudication, cyanosis, dyspnea on exertion, irregular heartbeat, leg swelling, near-syncope, orthopnea, palpitations, paroxysmal nocturnal dyspnea and syncope  Respiratory: Negative for cough and shortness of breath  Gastrointestinal: Negative for heartburn and nausea  Neurological: Negative for dizziness, focal weakness, headaches, light-headedness and weakness  All other systems reviewed and are negative  Allergies   Allergen Reactions   • Iodinated Contrast Media Facial Swelling           Current Outpatient Medications:   •  apixaban (ELIQUIS) 5 mg, Take 5 mg by mouth 2 (two) times a day , Disp: , Rfl:   •  atorvastatin (LIPITOR) 80 mg tablet, Take 1 tablet (80 mg total) by mouth daily at bedtime, Disp: 30 tablet, Rfl: 0  •  Empagliflozin-metFORMIN HCl (Synjardy) 12 5-1000 MG TABS, Take 1 tablet by mouth, Disp: , Rfl:   •  fenofibrate (TRICOR) 54 MG tablet, , Disp: , Rfl:   •  FREESTYLE LITE test strip, , Disp: , Rfl:   •  furosemide (LASIX) 40 mg tablet, Take 0 5 tablets (20 mg total) by mouth daily, Disp: , Rfl: 0  •  ipratropium (ATROVENT) 0 03 % nasal spray, 2 sprays into each nostril 2 (two) times a day, Disp: 30 mL, Rfl: 5  •  lisinopril (ZESTRIL) 10 mg tablet, Take 10 mg by mouth daily, Disp: , Rfl:   •  metoprolol succinate (TOPROL-XL) 100 mg 24 hr tablet, Take 1 tablet (100 mg total) by mouth daily, Disp: 60 tablet, Rfl: 0  •  omega-3-acid ethyl esters (LOVAZA) 1 g capsule, Take 2 g by mouth 2 (two) times a day , Disp: , Rfl:   •  ticagrelor (Brilinta) 60 MG, Take 1 tablet (60 mg total) by mouth every 12 (twelve) hours, Disp: , Rfl:   •  Trulicity 4 5 XE/2 2BJ SOPN, , Disp: , Rfl:         Social History     Socioeconomic History   • Marital status: /Civil Union     Spouse name: Not on file   • Number of children: Not on file   • Years of education: Not on file   • Highest education level: Not on file   Occupational "History   • Not on file   Tobacco Use   • Smoking status: Former   • Smokeless tobacco: Never   Vaping Use   • Vaping Use: Never used   Substance and Sexual Activity   • Alcohol use: Yes     Comment: holidays   • Drug use: Never   • Sexual activity: Yes     Partners: Female   Other Topics Concern   • Not on file   Social History Narrative   • Not on file     Social Determinants of Health     Financial Resource Strain: Not on file   Food Insecurity: Not on file   Transportation Needs: Not on file   Physical Activity: Not on file   Stress: Not on file   Social Connections: Not on file   Intimate Partner Violence: Not on file   Housing Stability: Not on file       Family History   Problem Relation Age of Onset   • Diabetes Mother    • Alzheimer's disease Mother    • Cancer Sister    • Aneurysm Father        Physical Exam  Vitals and nursing note reviewed  Constitutional:       General: He is not in acute distress  Appearance: He is obese  HENT:      Head: Normocephalic and atraumatic  Eyes:      Conjunctiva/sclera: Conjunctivae normal    Cardiovascular:      Rate and Rhythm: Normal rate and regular rhythm  Pulses: Intact distal pulses  Heart sounds: Normal heart sounds  Pulmonary:      Effort: Pulmonary effort is normal       Breath sounds: Normal breath sounds  Abdominal:      General: Bowel sounds are normal       Palpations: Abdomen is soft  Musculoskeletal:         General: Normal range of motion  Cervical back: Normal range of motion and neck supple  Skin:     General: Skin is warm and dry  Neurological:      Mental Status: He is alert and oriented to person, place, and time  Vitals: Blood pressure 110/70, pulse 88, height 5' 8\" (1 727 m), weight 96 6 kg (213 lb), SpO2 98 %     Wt Readings from Last 3 Encounters:   06/12/23 96 6 kg (213 lb)   01/04/23 97 2 kg (214 lb 3 2 oz)   11/22/22 92 1 kg (203 lb)         Labs & Results:  Lab Results   Component Value Date    HCT 49 2 " "2022    HGB 16 4 2022    MCV 96 2022     2022    WBC 8 61 2022     No results found for: \"BNP\"  No components found for: \"CHEM\"  Troponin I   Date Value Ref Range Status   10/24/2020 <0 02 <=0 04 ng/mL Final     Comment:     [employer] Chemistry analyzer 99% cutoff is > 0 04 ng/mL in network labs     o cTnI 99% cutoff is useful only when applied to patients in the clinical setting of myocardial ischemia   o cTnI 99% cutoff should be interpreted in the context of clinical history, ECG findings and possibly cardiac imaging to establish correct diagnosis  o cTnI 99% cutoff may be suggestive but clearly not indicative of a coronary event without the clinical setting of myocardial ischemia  Results for orders placed during the hospital encounter of 10/24/20    Echo complete with contrast if indicated    [address] [id number] [phone]    Transthoracic Echocardiogram  2D, M-mode, Doppler, and Color Doppler    Study date:  25-Oct-2020    Patient: Gloria Rgoers  MR number: [id number]  Account number: [de-identified]  : 1960  Age: 63 years  Gender: Male  Status: Inpatient  Location: Bedside  Height: 68 in  Weight: 253 4 lb  BP: 155/ 99 mmHg    Indications: Afib    Diagnoses: I48 0 - Atrial fibrillation    Sonographer:  Riley Hernandez RDCS  Interpreting Physician:  Almaz Beth MD  Primary Physician:  Forest Interiano MD  Referring Physician:  Silas Viveros MD  Group:  [facility]    SUMMARY    PROCEDURE INFORMATION:  This was a technically difficult study  LEFT VENTRICLE:  Systolic function was mildly reduced  Ejection fraction was estimated to be 45 %  There was mild diffuse hypokinesis  Wall thickness was mildly increased  There was mild concentric hypertrophy    Features were consistent with a pseudonormal left ventricular filling pattern, with concomitant abnormal " relaxation and increased filling pressure (grade 2 diastolic dysfunction)  MITRAL VALVE:  There was trace regurgitation  AORTIC VALVE:  There was trace regurgitation  TRICUSPID VALVE:  There was trace to mild regurgitation  Pulmonary artery systolic pressure was within the normal range  AORTA:  The root exhibited mild dilatation - 3 8 cm  HISTORY: PRIOR HISTORY: Afib, DM, HLD, HTN, PAD, Sleep Apnea    PROCEDURE: The procedure was performed at the bedside  This was a routine study  The transthoracic approach was used  The study included complete 2D imaging, M-mode, complete spectral Doppler, and color Doppler  The heart rate was 91 bpm,  at the start of the study  Images were obtained from the parasternal, apical, subcostal, and suprasternal notch acoustic windows  This was a technically difficult study  LEFT VENTRICLE: Size was normal  Systolic function was mildly reduced  Ejection fraction was estimated to be 45 %  There was mild diffuse hypokinesis  Wall thickness was mildly increased  There was mild concentric hypertrophy  No evidence  of apical thrombus  DOPPLER: Features were consistent with a pseudonormal left ventricular filling pattern, with concomitant abnormal relaxation and increased filling pressure (grade 2 diastolic dysfunction)  RIGHT VENTRICLE: The size was normal  Systolic function was normal  Wall thickness was normal     LEFT ATRIUM: Size was normal     RIGHT ATRIUM: Size was normal     MITRAL VALVE: Valve structure was normal  There was normal leaflet separation  DOPPLER: The transmitral velocity was within the normal range  There was no evidence for stenosis  There was trace regurgitation  AORTIC VALVE: The valve was trileaflet  Leaflets exhibited normal thickness and normal cuspal separation  DOPPLER: Transaortic velocity was within the normal range  There was no evidence for stenosis  There was trace regurgitation      TRICUSPID VALVE: The valve structure was normal  There was normal leaflet separation  DOPPLER: The transtricuspid velocity was within the normal range  There was no evidence for stenosis  There was trace to mild regurgitation  Pulmonary  artery systolic pressure was within the normal range  PULMONIC VALVE: Leaflets exhibited normal thickness, no calcification, and normal cuspal separation  DOPPLER: The transpulmonic velocity was within the normal range  There was no significant regurgitation  PERICARDIUM: There was no pericardial effusion  The pericardium was normal in appearance  AORTA: The root exhibited mild dilatation - 3 8 cm  SYSTEMIC VEINS: IVC: The inferior vena cava was normal in size  SYSTEM MEASUREMENT TABLES    2D mode  AoR Diam (2D): 37 mm  AoR Diam; Mean (2D): 37 mm  Asc Aorta Diam (2D): 38 mm  Asc Aorta Diam; Mean (2D): 38 mm  LA Dimension (2D): 42 mm  LA Dimension; Mean (2D): 42 mm  LA/Ao (2D): 1 1  EDV (2D-Cubed): 87204 mm3  EF (2D-Cubed): 72 %  ESV (2D-Cubed): 70664 mm3  FS (2D-Cubed): 34 6 %  FS (2D-Teich): 34 6 %  IVS/LVPW (2D): 1  IVSd (2D): 11 3 mm  IVSd; Mean chosen (2D): 11 3 mm  LVIDd (2D): 45 4 mm  LVIDd; Mean (2D): 45 4 mm  LVIDs (2D): 29 7 mm  LVIDs; Mean (2D): 29 7 mm  LVPWd (2D): 11 4 mm  LVPWd; Mean (2D): 11 4 mm  Left Ventricular Ejection Fraction; Teichholz; 2D mode;: 63 8 %  Left Ventricular End Diastolic Volume; Teichholz; 2D mode;: 13744 mm3  Left Ventricular End Systolic Volume; Teichholz; 2D mode;: 25557 mm3  SI (2D-Cubed): 29 8 ml/m2  SV (2D-Cubed): 18972 mm3  Stroke Index; Teichholz; 2D mode;: 26 6 ml/m2  Stroke Volume; Teichholz; 2D mode;: 07986 mm3    Apical four chamber  Left Atrium MOD Diam; Most recent value chosen; End Systole; Apical four chamber;: 23 8 mm  Left Atrium MOD Diam; Most recent value chosen; End Systole; Apical four chamber;: 22 4 mm  Left Atrium MOD Diam; Most recent value chosen; End Systole; Apical four chamber;: 20 6 mm  Left Atrium MOD Diam; Most recent value chosen;  End Systole; Apical four chamber;: 11 2 mm  Left Atrium MOD Diam; Most recent value chosen; End Systole; Apical four chamber;: 21 mm  Left Atrium MOD Diam; Most recent value chosen; End Systole; Apical four chamber;: 28 4 mm  Left Atrium MOD Diam; Most recent value chosen; End Systole; Apical four chamber;: 32 2 mm  Left Atrium MOD Diam; Most recent value chosen; End Systole; Apical four chamber;: 35 7 mm  Left Atrium MOD Diam; Most recent value chosen; End Systole; Apical four chamber;: 36 7 mm  Left Atrium MOD Diam; Most recent value chosen; End Systole; Apical four chamber;: 37 4 mm  Left Atrium MOD Diam; Most recent value chosen; End Systole; Apical four chamber;: 37 1 mm  Left Atrium MOD Diam; Most recent value chosen; End Systole; Apical four chamber;: 37 1 mm  Left Atrium MOD Diam; Most recent value chosen; End Systole; Apical four chamber;: 36 1 mm  Left Atrium MOD Diam; Most recent value chosen; End Systole; Apical four chamber;: 35 4 mm  Left Atrium MOD Diam; Most recent value chosen; End Systole; Apical four chamber;: 34 3 mm  Left Atrium MOD Diam; Most recent value chosen; End Systole; Apical four chamber;: 32 9 mm  Left Atrium MOD Diam; Most recent value chosen; End Systole; Apical four chamber;: 31 1 mm  Left Atrium MOD Diam; Most recent value chosen; End Systole; Apical four chamber;: 29 7 mm  Left Atrium MOD Diam; Most recent value chosen; End Systole; Apical four chamber;: 27 7 mm  Left Atrium MOD Diam; Most recent value chosen; End Systole; Apical four chamber;: 25 9 mm  Left Atrium Systolic Area; Most recent value chosen; Method of Disks, Single Plane; 2D mode; Apical four chamber;: 1720 mm2  Left Atrium Systolic Volume Index; Method of Disks, Single Plane; 2D mode; Apical four chamber;: 18 6 ml/m2  Left Atrium Systolic Volume; Most recent value chosen; Method of Disks, Single Plane; 2D mode; Apical four chamber;: 24696 mm3  Left Atrium systolic major axis;  Most recent value chosen; Method of Disks, Single Plane; 2D mode; Apical four chamber;: 57 mm  EF (A4C): 52 2 %  LV MOD Diam; Recent value; End Diastole (A4C): 43 8 mm  LV MOD Diam; Recent value; End Diastole (A4C): 43 8 mm  LV MOD Diam; Recent value; End Diastole (A4C): 20 6 mm  LV MOD Diam; Recent value; End Diastole (A4C): 14 2 mm  LV MOD Diam; Recent value; End Diastole (A4C): 21 3 mm  LV MOD Diam; Recent value; End Diastole (A4C): 26 mm  LV MOD Diam; Recent value; End Diastole (A4C): 29 9 mm  LV MOD Diam; Recent value; End Diastole (A4C): 32 4 mm  LV MOD Diam; Recent value; End Diastole (A4C): 33 8 mm  LV MOD Diam; Recent value; End Diastole (A4C): 35 2 mm  LV MOD Diam; Recent value; End Diastole (A4C): 36 3 mm  LV MOD Diam; Recent value; End Diastole (A4C): 36 3 mm  LV MOD Diam; Recent value; End Diastole (A4C): 36 3 mm  LV MOD Diam; Recent value; End Diastole (A4C): 37 4 mm  LV MOD Diam; Recent value; End Diastole (A4C): 38 5 mm  LV MOD Diam; Recent value; End Diastole (A4C): 40 2 mm  LV MOD Diam; Recent value; End Diastole (A4C): 42 mm  LV MOD Diam; Recent value; End Diastole (A4C): 43 5 mm  LV MOD Diam; Recent value; End Diastole (A4C): 43 8 mm  LV MOD Diam; Recent value; End Diastole (A4C): 42 7 mm  LV MOD Diam; Recent value; End Systole (A4C): 14 8 mm  LV MOD Diam; Recent value; End Systole (A4C): 33 5 mm  LV MOD Diam; Recent value; End Systole (A4C): 32 mm  LV MOD Diam; Recent value; End Systole (A4C): 8 1 mm  LV MOD Diam; Recent value; End Systole (A4C): 11 3 mm  LV MOD Diam; Recent value; End Systole (A4C): 14 4 mm  LV MOD Diam; Recent value; End Systole (A4C): 16 9 mm  LV MOD Diam; Recent value; End Systole (A4C): 19 3 mm  LV MOD Diam; Recent value; End Systole (A4C): 21 5 mm  LV MOD Diam; Recent value; End Systole (A4C): 23 3 mm  LV MOD Diam; Recent value; End Systole (A4C): 24 7 mm  LV MOD Diam; Recent value; End Systole (A4C): 25 7 mm  LV MOD Diam; Recent value; End Systole (A4C): 26 1 mm  LV MOD Diam; Recent value;  End Systole (A4C): 27 1 mm  LV MOD Diam; Recent value; End Systole (A4C): 28 1 mm  LV MOD Diam; Recent value; End Systole (A4C): 29 9 mm  LV MOD Diam; Recent value; End Systole (A4C): 32 mm  LV MOD Diam; Recent value; End Systole (A4C): 33 8 mm  LV MOD Diam; Recent value; End Systole (A4C): 34 5 mm  LV MOD Diam; Recent value; End Systole (A4C): 34 5 mm  Left Ventricle diastolic major axis; Most recent value chosen; Method of Disks, Single Plane; 2D mode; Apical four chamber;: 92 5 mm  Left Ventricle systolic major axis; Most recent value chosen; Method of Disks, Single Plane; 2D mode; Apical four chamber;: 85 2 mm  Left Ventricular Diastolic Area; Most recent value chosen; Method of Disks, Single Plane; 2D mode; Apical four chamber;: 3240 mm2  Left Ventricular End Diastolic Volume; Most recent value chosen; Method of Disks, Single Plane; 2D mode; Apical four chamber;: 97122 mm3  Left Ventricular End Systolic Volume; Most recent value chosen; Method of Disks, Single Plane; 2D mode; Apical four chamber;: 62040 mm3  Left Ventricular Systolic Area; Most recent value chosen; Method of Disks, Single Plane; 2D mode; Apical four chamber;: 2110 mm2  SI (A4C): 21 6 ml/m2  SV (A4C): 12735 mm3  Right Atrium MOD Diam; Most recent value chosen; Method of Disks, Single Plane; End Systole; 2D mode; Apical four chamber;: 15 9 mm  Right Atrium MOD Diam; Most recent value chosen; Method of Disks, Single Plane; End Systole; 2D mode; Apical four chamber;: 23 mm  Right Atrium MOD Diam; Most recent value chosen; Method of Disks, Single Plane; End Systole; 2D mode; Apical four chamber;: 27 3 mm  Right Atrium MOD Diam; Most recent value chosen; Method of Disks, Single Plane; End Systole; 2D mode; Apical four chamber;: 29 7 mm  Right Atrium MOD Diam; Most recent value chosen; Method of Disks, Single Plane; End Systole; 2D mode; Apical four chamber;: 31 8 mm  Right Atrium MOD Diam; Most recent value chosen; Method of Disks, Single Plane; End Systole; 2D mode;  Apical four chamber;: 33 3 mm  Right Atrium MOD Diam; Most recent value chosen; Method of Disks, Single Plane; End Systole; 2D mode; Apical four chamber;: 34 3 mm  Right Atrium MOD Diam; Most recent value chosen; Method of Disks, Single Plane; End Systole; 2D mode; Apical four chamber;: 34 mm  Right Atrium MOD Diam; Most recent value chosen; Method of Disks, Single Plane; End Systole; 2D mode; Apical four chamber;: 34 4 mm  Right Atrium MOD Diam; Most recent value chosen; Method of Disks, Single Plane; End Systole; 2D mode; Apical four chamber;: 34 4 mm  Right Atrium MOD Diam; Most recent value chosen; Method of Disks, Single Plane; End Systole; 2D mode; Apical four chamber;: 34 mm  Right Atrium MOD Diam; Most recent value chosen; Method of Disks, Single Plane; End Systole; 2D mode; Apical four chamber;: 32 9 mm  Right Atrium MOD Diam; Most recent value chosen; Method of Disks, Single Plane; End Systole; 2D mode; Apical four chamber;: 32 6 mm  Right Atrium MOD Diam; Most recent value chosen; Method of Disks, Single Plane; End Systole; 2D mode; Apical four chamber;: 32 6 mm  Right Atrium MOD Diam; Most recent value chosen; Method of Disks, Single Plane; End Systole; 2D mode; Apical four chamber;: 31 2 mm  Right Atrium MOD Diam; Most recent value chosen; Method of Disks, Single Plane; End Systole; 2D mode; Apical four chamber;: 30 1 mm  Right Atrium MOD Diam; Most recent value chosen; Method of Disks, Single Plane; End Systole; 2D mode; Apical four chamber;: 29 7 mm  Right Atrium MOD Diam; Most recent value chosen; Method of Disks, Single Plane; End Systole; 2D mode; Apical four chamber;: 28 mm  Right Atrium MOD Diam; Most recent value chosen; Method of Disks, Single Plane; End Systole; 2D mode; Apical four chamber;: 26 9 mm  Right Atrium MOD Diam; Most recent value chosen; Method of Disks, Single Plane; End Systole; 2D mode; Apical four chamber;: 5 7 mm  Right Atrium Systolic Area;  Most recent value chosen; Method of Disks, Single Plane; End Systole; 2D mode; Apical four chamber;: 1530 mm2  Right Atrium Systolic Major Axis; Most recent value chosen; Method of Disks, Single Plane; End Systole; 2D mode; Apical four chamber;: 51 7 mm  Right Atrium Systolic Volume Index; Method of Disks, Single Plane; End Systole; 2D mode; Apical four chamber;: 16 1 ml/m2  Right Atrium Systolic Volume; Most recent value chosen; Method of Disks, Single Plane; End Systole; 2D mode; Apical four chamber;: 32209 mm3  Right Ventricle Basal Diameter; 2D mode; Apical four chamber;: 24 4 mm  Right Ventricle Basal Diameter; Mean; Mean value chosen; 2D mode; Apical four chamber;: 24 4 mm    M mode  Tricuspid Annular Plane Systolic Excursion; Mean; Mean value chosen; Tricuspid Annulus; M mode;: 14 6 mm  Tricuspid Annular Plane Systolic Excursion; Tricuspid Annulus; M mode;: 14 6 mm    Tissue Doppler Imaging  LV Peak Early Kaur Tissue Charles; Mean; Medial MA (TDI): 82 7 mm/s  LV Peak Early Kaur Tissue Charles; Medial MA (TDI): 82 7 mm/s    Unspecified Scan Mode  MV Peak Charles/LV Peak Tissue Charles E-Wave; Medial MA: 12 7  DT; Antegrade Flow: 141 ms  DT; Mean; Antegrade Flow: 141 ms  Dec Cavalier; Antegrade Flow: 7470 mm/s2  Dec Cavalier; Mean; Antegrade Flow: 7470 mm/s2  MV E/A: 2 3  MV Peak A Charles: 466 mm/s  MV Peak A Charles; Mean: 466 mm/s  MV Peak E Charles; Antegrade Flow: 1050 mm/s  MV Peak E Charles; Mean; Antegrade Flow: 1050 mm/s  MVA (PHT): 537 mm2  PHT: 41 ms  PHT; Mean: 41 ms  Peak Grad; Mean; Regurgitant Flow: 22 mm[Hg]  Vmax; Mean; Regurgitant Flow: 2350 mm/s  Vmax; Regurgitant Flow: 2350 mm/s    [employer] Accredited Echocardi[facility]    Prepared and electronically signed by    Venu Degroot MD  Signed 25-Oct-2020 19:59:33    No results found for this or any previous visit  This note was completed in part utilizing [employer] direct voice recognition software     Grammatical errors, random word insertion, spelling mistakes, and incomplete sentences may be an occasional consequence of the system secondary to software limitations, ambient noise and hardware issues  At the time of dictation, efforts were made to edit, clarify and /or correct errors  Please read the chart carefully and recognize, using context, where substitutions have occurred    If you have any questions or concerns about the context, text or information contained within the body of this dictation, please contact myself, the provider, for further clarification

## 2023-06-21 ENCOUNTER — TELEPHONE (OUTPATIENT)
Dept: VASCULAR SURGERY | Facility: CLINIC | Age: 63
End: 2023-06-21

## 2023-06-21 NOTE — TELEPHONE ENCOUNTER
Attempted to contact patient to schedule appointment(s) listed below  Requested patient call [phone] option 3 to schedule appointment(s)  Patient's appointment(s) are due on or after 1/04/2024  Dopplers  [] Abdominal Aorta Iliac (AOIL)  [x] Carotid (CV)   [] Celiac and/or Mesenteric  [] Endovascular Aortic Repair (EVAR)   [] Hemodialysis Access (HD)   [x] Lower Limb Arterial (DARYN)  [] Lower Limb Venous (LEV)  [] Lower Limb Venous Duplex with Reflux (LEVDR)  [] Renal Artery  [] Upper Limb Arterial (UEA)    [] Upper Limb Venous (UEV)              [] KRISTIE and Waveform analysis     Advanced Imaging   [] CTA head/neck    [] CTA abdomen    [] CTA abdomen & pelvis    [] CT abdomen with/ without contrast  [] CT abdomen with contrast  [] CT abdomen without contrast    [] CT abdomen & pelvis with/ without contrast  [] CT abdomen & pelvis with contrast  [] CT abdomen & pelvis without contrast    Office Visit   [] New patient, patient last seen over 3 years ago  [x] Risk factor modification (RFM)   [] Follow up   [] Lost to follow up (LTFU)  LM for patient to schedule dopplers

## 2023-07-31 DIAGNOSIS — K58.0 IRRITABLE BOWEL SYNDROME WITH DIARRHEA: Primary | ICD-10-CM

## 2023-08-01 ENCOUNTER — TELEPHONE (OUTPATIENT)
Dept: GASTROENTEROLOGY | Facility: CLINIC | Age: 63
End: 2023-08-01

## 2023-08-03 ENCOUNTER — TELEPHONE (OUTPATIENT)
Dept: GASTROENTEROLOGY | Facility: CLINIC | Age: 63
End: 2023-08-03

## 2024-04-26 NOTE — TELEPHONE ENCOUNTER
----- Message from Ottoniel Dang PA-C sent at 8/2/2023  4:02 PM EDT -----  Regarding: FW: Medicine for IBS  Contact: [phone]  Please follow-up if we need to do a prior authorization. Otherwise, we may need [facility], and he would have to have it shipped to [place]. Thank you.  ----- Message -----  From: Loy March  Sent: 8/2/2023   3:51 PM EDT  To: [facility] Provider  Subject: FW: Medicine for IBS                               ----- Message -----  From: Davey Montejo  Sent: 8/2/2023   3:23 PM EDT  To: [facility]  Subject: Medicine for IBS                                 Is there something that Dr. Herman Mosley did or some kind of a letter of explanation why I needed this medicine the last time my insurance company is saying they didn’t cover it or they were going to cover it or is there a generic medicine that is like it that might be able to substitute for it. The [facility] pharmacy in [place] is saying our [facility] is not covering it if there’s anything that you could do to help I appreciate it.  Thanks leonid
August 2, 2023  Martin Sanders PA-C  to Gastroenterology [address] [place] [address]        8/2/23  4:02 PM  Please follow-up if we need to do a prior authorization. Tom Gibbs, we may need to send it to [facility], and he would have to have it shipped to [address] [place] [address].
Please see note from yesterday, prior auth for Xifaxan was submitted yesterday. Waiting for determination.
Routing to [employer] clinical to Prior auth team.
rcvd fax Curt Carreno is approved  Approval letter scanned in media  Advised pt via [employer]LAt
no

## 2024-11-26 ENCOUNTER — TELEPHONE (OUTPATIENT)
Age: 64
End: 2024-11-26

## 2024-11-26 NOTE — TELEPHONE ENCOUNTER
Pt is requesting a copy of the pathology report and other notes records of the EGD/Colonoscopy completed in 2012. Gave fax # [phone]
